# Patient Record
Sex: MALE | Race: WHITE | Employment: OTHER | URBAN - METROPOLITAN AREA
[De-identification: names, ages, dates, MRNs, and addresses within clinical notes are randomized per-mention and may not be internally consistent; named-entity substitution may affect disease eponyms.]

---

## 2017-01-20 ENCOUNTER — OFFICE VISIT (OUTPATIENT)
Dept: INTERNAL MEDICINE CLINIC | Age: 72
End: 2017-01-20

## 2017-01-20 VITALS
HEART RATE: 70 BPM | SYSTOLIC BLOOD PRESSURE: 120 MMHG | BODY MASS INDEX: 38.04 KG/M2 | DIASTOLIC BLOOD PRESSURE: 70 MMHG | WEIGHT: 287 LBS | HEIGHT: 73 IN

## 2017-01-20 DIAGNOSIS — Z09 FOLLOW-UP EXAM: ICD-10-CM

## 2017-01-20 DIAGNOSIS — Z11.59 NEED FOR HEPATITIS C SCREENING TEST: ICD-10-CM

## 2017-01-20 DIAGNOSIS — I10 ESSENTIAL HYPERTENSION, BENIGN: Primary | ICD-10-CM

## 2017-01-20 DIAGNOSIS — D47.3 ESSENTIAL THROMBOCYTOSIS (HCC): ICD-10-CM

## 2017-01-20 DIAGNOSIS — D47.3 ESSENTIAL THROMBOCYTHEMIA (HCC): ICD-10-CM

## 2017-01-20 LAB
ANION GAP SERPL CALCULATED.3IONS-SCNC: 15 MMOL/L (ref 3–16)
BUN BLDV-MCNC: 20 MG/DL (ref 7–20)
CALCIUM SERPL-MCNC: 9.5 MG/DL (ref 8.3–10.6)
CHLORIDE BLD-SCNC: 104 MMOL/L (ref 99–110)
CO2: 27 MMOL/L (ref 21–32)
CREAT SERPL-MCNC: 0.9 MG/DL (ref 0.8–1.3)
GFR AFRICAN AMERICAN: >60
GFR NON-AFRICAN AMERICAN: >60
GLUCOSE BLD-MCNC: 99 MG/DL (ref 70–99)
HEPATITIS C ANTIBODY INTERPRETATION: NORMAL
POTASSIUM SERPL-SCNC: 3.9 MMOL/L (ref 3.5–5.1)
SODIUM BLD-SCNC: 146 MMOL/L (ref 136–145)

## 2017-01-20 PROCEDURE — 36415 COLL VENOUS BLD VENIPUNCTURE: CPT | Performed by: INTERNAL MEDICINE

## 2017-01-20 PROCEDURE — 99213 OFFICE O/P EST LOW 20 MIN: CPT | Performed by: INTERNAL MEDICINE

## 2017-01-20 ASSESSMENT — ENCOUNTER SYMPTOMS
WHEEZING: 0
COUGH: 0
GASTROINTESTINAL NEGATIVE: 1
CHEST TIGHTNESS: 0
SHORTNESS OF BREATH: 0

## 2017-02-11 PROBLEM — Z79.899 ENCOUNTER FOR LONG-TERM (CURRENT) USE OF HIGH-RISK MEDICATION: Status: ACTIVE | Noted: 2017-02-11

## 2017-02-11 PROBLEM — Z15.89 JAK-2 GENE MUTATION: Status: ACTIVE | Noted: 2017-02-11

## 2017-02-25 RX ORDER — TRIAMTERENE AND HYDROCHLOROTHIAZIDE 37.5; 25 MG/1; MG/1
TABLET ORAL
Qty: 30 TABLET | Refills: 5 | Status: SHIPPED | OUTPATIENT
Start: 2017-02-25 | End: 2017-08-20 | Stop reason: SDUPTHER

## 2017-05-12 ENCOUNTER — OFFICE VISIT (OUTPATIENT)
Dept: INTERNAL MEDICINE CLINIC | Age: 72
End: 2017-05-12

## 2017-05-12 VITALS
WEIGHT: 293 LBS | RESPIRATION RATE: 18 BRPM | HEIGHT: 72 IN | BODY MASS INDEX: 39.68 KG/M2 | DIASTOLIC BLOOD PRESSURE: 80 MMHG | SYSTOLIC BLOOD PRESSURE: 120 MMHG | HEART RATE: 88 BPM

## 2017-05-12 DIAGNOSIS — I10 ESSENTIAL HYPERTENSION, BENIGN: Primary | ICD-10-CM

## 2017-05-12 DIAGNOSIS — N40.1 BENIGN NON-NODULAR PROSTATIC HYPERPLASIA WITH LOWER URINARY TRACT SYMPTOMS: ICD-10-CM

## 2017-05-12 LAB
BILIRUBIN URINE: NEGATIVE
BLOOD, URINE: NEGATIVE
CLARITY: CLEAR
COLOR: YELLOW
EPITHELIAL CELLS, UA: 0 /HPF (ref 0–5)
GLUCOSE URINE: NEGATIVE MG/DL
HYALINE CASTS: 1 /LPF (ref 0–8)
KETONES, URINE: NEGATIVE MG/DL
LEUKOCYTE ESTERASE, URINE: NEGATIVE
MICROSCOPIC EXAMINATION: NORMAL
NITRITE, URINE: NEGATIVE
PH UA: 6
PROTEIN UA: NEGATIVE MG/DL
RBC UA: 1 /HPF (ref 0–4)
SPECIFIC GRAVITY UA: 1.02
UROBILINOGEN, URINE: 1 E.U./DL
WBC UA: 0 /HPF (ref 0–5)

## 2017-05-12 PROCEDURE — 81001 URINALYSIS AUTO W/SCOPE: CPT | Performed by: INTERNAL MEDICINE

## 2017-05-12 PROCEDURE — 99213 OFFICE O/P EST LOW 20 MIN: CPT | Performed by: INTERNAL MEDICINE

## 2017-05-12 RX ORDER — TAMSULOSIN HYDROCHLORIDE 0.4 MG/1
0.4 CAPSULE ORAL DAILY
Qty: 30 CAPSULE | Refills: 3 | Status: SHIPPED | OUTPATIENT
Start: 2017-05-12 | End: 2017-08-29 | Stop reason: SDUPTHER

## 2017-05-12 ASSESSMENT — ENCOUNTER SYMPTOMS
GASTROINTESTINAL NEGATIVE: 1
SORE THROAT: 0
SHORTNESS OF BREATH: 0
COUGH: 0
STRIDOR: 0
CHEST TIGHTNESS: 0
TROUBLE SWALLOWING: 0

## 2017-05-12 ASSESSMENT — PATIENT HEALTH QUESTIONNAIRE - PHQ9
2. FEELING DOWN, DEPRESSED OR HOPELESS: 0
1. LITTLE INTEREST OR PLEASURE IN DOING THINGS: 0
SUM OF ALL RESPONSES TO PHQ QUESTIONS 1-9: 0
SUM OF ALL RESPONSES TO PHQ9 QUESTIONS 1 & 2: 0

## 2017-08-18 ENCOUNTER — OFFICE VISIT (OUTPATIENT)
Dept: INTERNAL MEDICINE CLINIC | Age: 72
End: 2017-08-18

## 2017-08-18 VITALS
HEIGHT: 72 IN | WEIGHT: 289 LBS | DIASTOLIC BLOOD PRESSURE: 70 MMHG | HEART RATE: 85 BPM | SYSTOLIC BLOOD PRESSURE: 130 MMHG | OXYGEN SATURATION: 98 % | BODY MASS INDEX: 39.14 KG/M2 | RESPIRATION RATE: 16 BRPM

## 2017-08-18 DIAGNOSIS — I10 ESSENTIAL HYPERTENSION, BENIGN: Primary | ICD-10-CM

## 2017-08-18 PROCEDURE — 90670 PCV13 VACCINE IM: CPT | Performed by: INTERNAL MEDICINE

## 2017-08-18 PROCEDURE — 99213 OFFICE O/P EST LOW 20 MIN: CPT | Performed by: INTERNAL MEDICINE

## 2017-08-18 PROCEDURE — G0009 ADMIN PNEUMOCOCCAL VACCINE: HCPCS | Performed by: INTERNAL MEDICINE

## 2017-08-18 ASSESSMENT — ENCOUNTER SYMPTOMS
GASTROINTESTINAL NEGATIVE: 1
COUGH: 0
SHORTNESS OF BREATH: 0
WHEEZING: 0
CHEST TIGHTNESS: 0

## 2017-08-21 RX ORDER — TRIAMTERENE AND HYDROCHLOROTHIAZIDE 37.5; 25 MG/1; MG/1
TABLET ORAL
Qty: 30 TABLET | Refills: 5 | Status: SHIPPED | OUTPATIENT
Start: 2017-08-21 | End: 2018-01-12 | Stop reason: SDUPTHER

## 2017-08-29 RX ORDER — TAMSULOSIN HYDROCHLORIDE 0.4 MG/1
CAPSULE ORAL
Qty: 30 CAPSULE | Refills: 5 | Status: SHIPPED | OUTPATIENT
Start: 2017-08-29 | End: 2018-02-10 | Stop reason: SDUPTHER

## 2017-11-29 ENCOUNTER — OFFICE VISIT (OUTPATIENT)
Dept: INTERNAL MEDICINE CLINIC | Age: 72
End: 2017-11-29

## 2017-11-29 VITALS
HEART RATE: 72 BPM | OXYGEN SATURATION: 97 % | RESPIRATION RATE: 16 BRPM | WEIGHT: 281 LBS | SYSTOLIC BLOOD PRESSURE: 130 MMHG | DIASTOLIC BLOOD PRESSURE: 80 MMHG | HEIGHT: 72 IN | BODY MASS INDEX: 38.06 KG/M2

## 2017-11-29 DIAGNOSIS — I10 ESSENTIAL HYPERTENSION, BENIGN: Primary | ICD-10-CM

## 2017-11-29 DIAGNOSIS — L71.9 ROSACEA: ICD-10-CM

## 2017-11-29 DIAGNOSIS — Z12.5 SPECIAL SCREENING FOR MALIGNANT NEOPLASM OF PROSTATE: ICD-10-CM

## 2017-11-29 LAB
A/G RATIO: 2.4 (ref 1.1–2.2)
ALBUMIN SERPL-MCNC: 4.5 G/DL (ref 3.4–5)
ALP BLD-CCNC: 43 U/L (ref 40–129)
ALT SERPL-CCNC: 11 U/L (ref 10–40)
ANION GAP SERPL CALCULATED.3IONS-SCNC: 12 MMOL/L (ref 3–16)
AST SERPL-CCNC: 14 U/L (ref 15–37)
BASOPHILS ABSOLUTE: 0 K/UL (ref 0–0.2)
BASOPHILS RELATIVE PERCENT: 1 %
BILIRUB SERPL-MCNC: 0.9 MG/DL (ref 0–1)
BUN BLDV-MCNC: 16 MG/DL (ref 7–20)
CALCIUM SERPL-MCNC: 9.3 MG/DL (ref 8.3–10.6)
CHLORIDE BLD-SCNC: 102 MMOL/L (ref 99–110)
CHOLESTEROL, TOTAL: 159 MG/DL (ref 0–199)
CO2: 28 MMOL/L (ref 21–32)
CREAT SERPL-MCNC: 0.7 MG/DL (ref 0.8–1.3)
EOSINOPHILS ABSOLUTE: 0.1 K/UL (ref 0–0.6)
EOSINOPHILS RELATIVE PERCENT: 2.1 %
GFR AFRICAN AMERICAN: >60
GFR NON-AFRICAN AMERICAN: >60
GLOBULIN: 1.9 G/DL
GLUCOSE BLD-MCNC: 103 MG/DL (ref 70–99)
HCT VFR BLD CALC: 39.2 % (ref 40.5–52.5)
HDLC SERPL-MCNC: 43 MG/DL (ref 40–60)
HEMOGLOBIN: 13.7 G/DL (ref 13.5–17.5)
LDL CHOLESTEROL CALCULATED: 97 MG/DL
LYMPHOCYTES ABSOLUTE: 1 K/UL (ref 1–5.1)
LYMPHOCYTES RELATIVE PERCENT: 19.4 %
MCH RBC QN AUTO: 35.1 PG (ref 26–34)
MCHC RBC AUTO-ENTMCNC: 35 G/DL (ref 31–36)
MCV RBC AUTO: 100.3 FL (ref 80–100)
MONOCYTES ABSOLUTE: 0.4 K/UL (ref 0–1.3)
MONOCYTES RELATIVE PERCENT: 7.2 %
NEUTROPHILS ABSOLUTE: 3.6 K/UL (ref 1.7–7.7)
NEUTROPHILS RELATIVE PERCENT: 70.3 %
PDW BLD-RTO: 15 % (ref 12.4–15.4)
PLATELET # BLD: 291 K/UL (ref 135–450)
PMV BLD AUTO: 6.9 FL (ref 5–10.5)
POTASSIUM SERPL-SCNC: 3.9 MMOL/L (ref 3.5–5.1)
PROSTATE SPECIFIC ANTIGEN: 2.49 NG/ML (ref 0–4)
RBC # BLD: 3.91 M/UL (ref 4.2–5.9)
SODIUM BLD-SCNC: 142 MMOL/L (ref 136–145)
TOTAL PROTEIN: 6.4 G/DL (ref 6.4–8.2)
TRIGL SERPL-MCNC: 96 MG/DL (ref 0–150)
VLDLC SERPL CALC-MCNC: 19 MG/DL
WBC # BLD: 5.1 K/UL (ref 4–11)

## 2017-11-29 PROCEDURE — 99213 OFFICE O/P EST LOW 20 MIN: CPT | Performed by: INTERNAL MEDICINE

## 2017-11-29 PROCEDURE — 36415 COLL VENOUS BLD VENIPUNCTURE: CPT | Performed by: INTERNAL MEDICINE

## 2017-11-29 PROCEDURE — 1123F ACP DISCUSS/DSCN MKR DOCD: CPT | Performed by: INTERNAL MEDICINE

## 2017-11-29 PROCEDURE — G8484 FLU IMMUNIZE NO ADMIN: HCPCS | Performed by: INTERNAL MEDICINE

## 2017-11-29 PROCEDURE — G8417 CALC BMI ABV UP PARAM F/U: HCPCS | Performed by: INTERNAL MEDICINE

## 2017-11-29 PROCEDURE — G8427 DOCREV CUR MEDS BY ELIG CLIN: HCPCS | Performed by: INTERNAL MEDICINE

## 2017-11-29 PROCEDURE — 4040F PNEUMOC VAC/ADMIN/RCVD: CPT | Performed by: INTERNAL MEDICINE

## 2017-11-29 PROCEDURE — 3017F COLORECTAL CA SCREEN DOC REV: CPT | Performed by: INTERNAL MEDICINE

## 2017-11-29 PROCEDURE — 1036F TOBACCO NON-USER: CPT | Performed by: INTERNAL MEDICINE

## 2017-11-29 ASSESSMENT — ENCOUNTER SYMPTOMS
GASTROINTESTINAL NEGATIVE: 1
SHORTNESS OF BREATH: 0
WHEEZING: 0
COUGH: 0
CHEST TIGHTNESS: 0

## 2017-11-29 NOTE — PROGRESS NOTES
Subjective:      Patient ID: Myriam Sher is a 81 y.o. male.htn  HPI  Feels fine and has no new symptoms  Has n symptoms with activity  Has no other cp gi or gu symptoms and flomax is helping his urinary symptoms  Review of Systems   Respiratory: Negative for cough, chest tightness, shortness of breath and wheezing. Cardiovascular: Negative for chest pain, palpitations and leg swelling. Gastrointestinal: Negative. Genitourinary: Negative. Neurological: Negative for dizziness, light-headedness, numbness and headaches. Objective:   Physical Exam   Constitutional: He is oriented to person, place, and time. No distress. Eyes: Conjunctivae are normal. Pupils are equal, round, and reactive to light. No scleral icterus. Neck: No JVD present. No thyromegaly present. Cardiovascular: Normal rate, regular rhythm, normal heart sounds and intact distal pulses. Exam reveals no gallop. No murmur heard. Pulmonary/Chest: Breath sounds normal. No respiratory distress. He has no wheezes. He has no rales. Musculoskeletal: Edema: trace edema of legs+   Lymphadenopathy:     He has no cervical adenopathy. Neurological: He is alert and oriented to person, place, and time. Skin:   Has erythematous rash seen over both malar areas and nose and mid frontal areas and has papules within this areas and has it for 6 months    Nursing note and vitals reviewed.       Assessment:      htn controlled  Skin rash most likely from rosacea      Plan:      Rash is not bothering him and has it on and off and if this gets worse,may need treatment  Continue current medications  Will check labs today   See in  3 months

## 2018-01-12 RX ORDER — TRIAMTERENE AND HYDROCHLOROTHIAZIDE 37.5; 25 MG/1; MG/1
TABLET ORAL
Qty: 30 TABLET | Refills: 5 | Status: SHIPPED | OUTPATIENT
Start: 2018-01-12 | End: 2018-07-03 | Stop reason: SDUPTHER

## 2018-02-12 RX ORDER — TAMSULOSIN HYDROCHLORIDE 0.4 MG/1
CAPSULE ORAL
Qty: 30 CAPSULE | Refills: 5 | Status: SHIPPED | OUTPATIENT
Start: 2018-02-12 | End: 2018-02-23 | Stop reason: SDUPTHER

## 2018-02-23 ENCOUNTER — OFFICE VISIT (OUTPATIENT)
Dept: INTERNAL MEDICINE CLINIC | Age: 73
End: 2018-02-23

## 2018-02-23 VITALS
SYSTOLIC BLOOD PRESSURE: 120 MMHG | BODY MASS INDEX: 38.6 KG/M2 | HEART RATE: 90 BPM | DIASTOLIC BLOOD PRESSURE: 70 MMHG | HEIGHT: 72 IN | RESPIRATION RATE: 18 BRPM | OXYGEN SATURATION: 96 % | WEIGHT: 285 LBS

## 2018-02-23 DIAGNOSIS — N40.1 BENIGN NON-NODULAR PROSTATIC HYPERPLASIA WITH LOWER URINARY TRACT SYMPTOMS: ICD-10-CM

## 2018-02-23 DIAGNOSIS — I10 ESSENTIAL HYPERTENSION, BENIGN: Primary | ICD-10-CM

## 2018-02-23 PROCEDURE — G8417 CALC BMI ABV UP PARAM F/U: HCPCS | Performed by: INTERNAL MEDICINE

## 2018-02-23 PROCEDURE — 3017F COLORECTAL CA SCREEN DOC REV: CPT | Performed by: INTERNAL MEDICINE

## 2018-02-23 PROCEDURE — 1036F TOBACCO NON-USER: CPT | Performed by: INTERNAL MEDICINE

## 2018-02-23 PROCEDURE — 99213 OFFICE O/P EST LOW 20 MIN: CPT | Performed by: INTERNAL MEDICINE

## 2018-02-23 PROCEDURE — 1123F ACP DISCUSS/DSCN MKR DOCD: CPT | Performed by: INTERNAL MEDICINE

## 2018-02-23 PROCEDURE — G8484 FLU IMMUNIZE NO ADMIN: HCPCS | Performed by: INTERNAL MEDICINE

## 2018-02-23 PROCEDURE — G8427 DOCREV CUR MEDS BY ELIG CLIN: HCPCS | Performed by: INTERNAL MEDICINE

## 2018-02-23 PROCEDURE — 4040F PNEUMOC VAC/ADMIN/RCVD: CPT | Performed by: INTERNAL MEDICINE

## 2018-02-23 RX ORDER — TAMSULOSIN HYDROCHLORIDE 0.4 MG/1
CAPSULE ORAL
Qty: 30 CAPSULE | Refills: 5 | Status: SHIPPED | OUTPATIENT
Start: 2018-02-23 | End: 2018-08-07 | Stop reason: SDUPTHER

## 2018-02-23 ASSESSMENT — ENCOUNTER SYMPTOMS
GASTROINTESTINAL NEGATIVE: 1
TROUBLE SWALLOWING: 0
COUGH: 0
SHORTNESS OF BREATH: 0
WHEEZING: 0
CHEST TIGHTNESS: 0

## 2018-02-23 NOTE — PROGRESS NOTES
Subjective:      Patient ID: Sheldon Melo is a 81 y.o. male.htn     HPI  Has few aches and pains some times  Has no symplasm with activity  flomax is helping his symptoms and sleeping better   Has no cp gi or gu symptoms  Quit CPAP long time ago as he could not tolerate sleeping with t his. Review of Systems   Constitutional: Negative for activity change, appetite change and unexpected weight change. HENT: Negative for trouble swallowing. Respiratory: Negative for cough, chest tightness, shortness of breath and wheezing. Cardiovascular: Positive for leg swelling. Negative for chest pain. Gastrointestinal: Negative. Neurological: Negative for dizziness, light-headedness and headaches. Objective:   Physical Exam   Constitutional: He is oriented to person, place, and time. No distress. Eyes: Conjunctivae and EOM are normal. Pupils are equal, round, and reactive to light. No scleral icterus. Neck: No JVD present. No thyromegaly present. Cardiovascular: Normal rate, regular rhythm, normal heart sounds and intact distal pulses. Exam reveals no gallop. No murmur heard. Pulmonary/Chest: Breath sounds normal. No respiratory distress. He has no wheezes. He has no rales. Musculoskeletal: Edema: has trace edema of legs. Lymphadenopathy:     He has no cervical adenopathy. Neurological: He is alert and oriented to person, place, and time. Nursing note and vitals reviewed.       Assessment:    HTN well controlled   bpg sym,ptoms improved   Plan:      Continue current medications  See in  3 months

## 2018-06-01 ENCOUNTER — OFFICE VISIT (OUTPATIENT)
Dept: INTERNAL MEDICINE CLINIC | Age: 73
End: 2018-06-01

## 2018-06-01 VITALS
RESPIRATION RATE: 16 BRPM | SYSTOLIC BLOOD PRESSURE: 120 MMHG | HEIGHT: 72 IN | BODY MASS INDEX: 38.47 KG/M2 | HEART RATE: 92 BPM | DIASTOLIC BLOOD PRESSURE: 78 MMHG | OXYGEN SATURATION: 98 % | WEIGHT: 284 LBS

## 2018-06-01 DIAGNOSIS — I10 ESSENTIAL HYPERTENSION, BENIGN: Primary | ICD-10-CM

## 2018-06-01 PROCEDURE — G8417 CALC BMI ABV UP PARAM F/U: HCPCS | Performed by: INTERNAL MEDICINE

## 2018-06-01 PROCEDURE — 1036F TOBACCO NON-USER: CPT | Performed by: INTERNAL MEDICINE

## 2018-06-01 PROCEDURE — 3288F FALL RISK ASSESSMENT DOCD: CPT | Performed by: INTERNAL MEDICINE

## 2018-06-01 PROCEDURE — 4040F PNEUMOC VAC/ADMIN/RCVD: CPT | Performed by: INTERNAL MEDICINE

## 2018-06-01 PROCEDURE — 1123F ACP DISCUSS/DSCN MKR DOCD: CPT | Performed by: INTERNAL MEDICINE

## 2018-06-01 PROCEDURE — G8427 DOCREV CUR MEDS BY ELIG CLIN: HCPCS | Performed by: INTERNAL MEDICINE

## 2018-06-01 PROCEDURE — 99213 OFFICE O/P EST LOW 20 MIN: CPT | Performed by: INTERNAL MEDICINE

## 2018-06-01 PROCEDURE — G8510 SCR DEP NEG, NO PLAN REQD: HCPCS | Performed by: INTERNAL MEDICINE

## 2018-06-01 PROCEDURE — 3017F COLORECTAL CA SCREEN DOC REV: CPT | Performed by: INTERNAL MEDICINE

## 2018-06-01 RX ORDER — DICLOFENAC SODIUM 75 MG/1
75 TABLET, DELAYED RELEASE ORAL 2 TIMES DAILY PRN
Qty: 60 TABLET | Refills: 0 | Status: SHIPPED | OUTPATIENT
Start: 2018-06-01 | End: 2019-04-04 | Stop reason: SDUPTHER

## 2018-06-01 ASSESSMENT — ENCOUNTER SYMPTOMS
WHEEZING: 0
COUGH: 0
GASTROINTESTINAL NEGATIVE: 1
CHEST TIGHTNESS: 0
SHORTNESS OF BREATH: 0

## 2018-06-01 ASSESSMENT — PATIENT HEALTH QUESTIONNAIRE - PHQ9
SUM OF ALL RESPONSES TO PHQ9 QUESTIONS 1 & 2: 0
2. FEELING DOWN, DEPRESSED OR HOPELESS: 0
SUM OF ALL RESPONSES TO PHQ QUESTIONS 1-9: 0
1. LITTLE INTEREST OR PLEASURE IN DOING THINGS: 0

## 2018-07-03 RX ORDER — TRIAMTERENE AND HYDROCHLOROTHIAZIDE 37.5; 25 MG/1; MG/1
TABLET ORAL
Qty: 30 TABLET | Refills: 5 | Status: SHIPPED | OUTPATIENT
Start: 2018-07-03 | End: 2018-10-25 | Stop reason: SDUPTHER

## 2018-08-07 RX ORDER — TAMSULOSIN HYDROCHLORIDE 0.4 MG/1
CAPSULE ORAL
Qty: 30 CAPSULE | Refills: 5 | Status: SHIPPED | OUTPATIENT
Start: 2018-08-07 | End: 2019-01-30 | Stop reason: SDUPTHER

## 2018-09-05 ENCOUNTER — OFFICE VISIT (OUTPATIENT)
Dept: INTERNAL MEDICINE CLINIC | Age: 73
End: 2018-09-05

## 2018-09-05 VITALS
SYSTOLIC BLOOD PRESSURE: 120 MMHG | WEIGHT: 285 LBS | BODY MASS INDEX: 38.6 KG/M2 | HEIGHT: 72 IN | DIASTOLIC BLOOD PRESSURE: 70 MMHG | HEART RATE: 89 BPM | RESPIRATION RATE: 16 BRPM | OXYGEN SATURATION: 98 %

## 2018-09-05 DIAGNOSIS — D47.3 ESSENTIAL THROMBOCYTHEMIA (HCC): ICD-10-CM

## 2018-09-05 DIAGNOSIS — I10 ESSENTIAL HYPERTENSION, BENIGN: Primary | ICD-10-CM

## 2018-09-05 PROCEDURE — 1101F PT FALLS ASSESS-DOCD LE1/YR: CPT | Performed by: INTERNAL MEDICINE

## 2018-09-05 PROCEDURE — 1123F ACP DISCUSS/DSCN MKR DOCD: CPT | Performed by: INTERNAL MEDICINE

## 2018-09-05 PROCEDURE — G8417 CALC BMI ABV UP PARAM F/U: HCPCS | Performed by: INTERNAL MEDICINE

## 2018-09-05 PROCEDURE — 4040F PNEUMOC VAC/ADMIN/RCVD: CPT | Performed by: INTERNAL MEDICINE

## 2018-09-05 PROCEDURE — 99213 OFFICE O/P EST LOW 20 MIN: CPT | Performed by: INTERNAL MEDICINE

## 2018-09-05 PROCEDURE — G8427 DOCREV CUR MEDS BY ELIG CLIN: HCPCS | Performed by: INTERNAL MEDICINE

## 2018-09-05 PROCEDURE — 3017F COLORECTAL CA SCREEN DOC REV: CPT | Performed by: INTERNAL MEDICINE

## 2018-09-05 PROCEDURE — 1036F TOBACCO NON-USER: CPT | Performed by: INTERNAL MEDICINE

## 2018-09-05 ASSESSMENT — ENCOUNTER SYMPTOMS
WHEEZING: 0
GASTROINTESTINAL NEGATIVE: 1
SHORTNESS OF BREATH: 0
CHEST TIGHTNESS: 0
COUGH: 0

## 2018-09-27 PROBLEM — Z11.59 NEED FOR HEPATITIS C SCREENING TEST: Status: RESOLVED | Noted: 2017-01-20 | Resolved: 2018-09-27

## 2018-10-25 RX ORDER — TRIAMTERENE AND HYDROCHLOROTHIAZIDE 37.5; 25 MG/1; MG/1
TABLET ORAL
Qty: 90 TABLET | Refills: 1 | Status: SHIPPED | OUTPATIENT
Start: 2018-10-25 | End: 2019-04-18 | Stop reason: SDUPTHER

## 2018-12-07 ENCOUNTER — OFFICE VISIT (OUTPATIENT)
Dept: INTERNAL MEDICINE CLINIC | Age: 73
End: 2018-12-07
Payer: MEDICARE

## 2018-12-07 VITALS
BODY MASS INDEX: 37.93 KG/M2 | HEIGHT: 72 IN | SYSTOLIC BLOOD PRESSURE: 120 MMHG | DIASTOLIC BLOOD PRESSURE: 70 MMHG | RESPIRATION RATE: 16 BRPM | HEART RATE: 84 BPM | OXYGEN SATURATION: 99 % | WEIGHT: 280 LBS

## 2018-12-07 DIAGNOSIS — I10 ESSENTIAL HYPERTENSION, BENIGN: Primary | ICD-10-CM

## 2018-12-07 DIAGNOSIS — Z12.5 PROSTATE CANCER SCREENING: ICD-10-CM

## 2018-12-07 LAB
ANION GAP SERPL CALCULATED.3IONS-SCNC: 11 MMOL/L (ref 3–16)
BASOPHILS ABSOLUTE: 0.1 K/UL (ref 0–0.2)
BASOPHILS RELATIVE PERCENT: 0.8 %
BUN BLDV-MCNC: 16 MG/DL (ref 7–20)
CALCIUM SERPL-MCNC: 9.4 MG/DL (ref 8.3–10.6)
CHLORIDE BLD-SCNC: 105 MMOL/L (ref 99–110)
CO2: 28 MMOL/L (ref 21–32)
CREAT SERPL-MCNC: 0.8 MG/DL (ref 0.8–1.3)
EOSINOPHILS ABSOLUTE: 0.2 K/UL (ref 0–0.6)
EOSINOPHILS RELATIVE PERCENT: 2.9 %
GFR AFRICAN AMERICAN: >60
GFR NON-AFRICAN AMERICAN: >60
GLUCOSE BLD-MCNC: 120 MG/DL (ref 70–99)
HCT VFR BLD CALC: 40.9 % (ref 40.5–52.5)
HEMOGLOBIN: 14.3 G/DL (ref 13.5–17.5)
LYMPHOCYTES ABSOLUTE: 1.1 K/UL (ref 1–5.1)
LYMPHOCYTES RELATIVE PERCENT: 18 %
MCH RBC QN AUTO: 35.1 PG (ref 26–34)
MCHC RBC AUTO-ENTMCNC: 34.9 G/DL (ref 31–36)
MCV RBC AUTO: 100.5 FL (ref 80–100)
MONOCYTES ABSOLUTE: 0.5 K/UL (ref 0–1.3)
MONOCYTES RELATIVE PERCENT: 8.2 %
NEUTROPHILS ABSOLUTE: 4.3 K/UL (ref 1.7–7.7)
NEUTROPHILS RELATIVE PERCENT: 70.1 %
PDW BLD-RTO: 15.1 % (ref 12.4–15.4)
PLATELET # BLD: 334 K/UL (ref 135–450)
PMV BLD AUTO: 7.2 FL (ref 5–10.5)
POTASSIUM SERPL-SCNC: 4.2 MMOL/L (ref 3.5–5.1)
PROSTATE SPECIFIC ANTIGEN: 2.89 NG/ML (ref 0–4)
RBC # BLD: 4.07 M/UL (ref 4.2–5.9)
SODIUM BLD-SCNC: 144 MMOL/L (ref 136–145)
WBC # BLD: 6.2 K/UL (ref 4–11)

## 2018-12-07 PROCEDURE — G8417 CALC BMI ABV UP PARAM F/U: HCPCS | Performed by: INTERNAL MEDICINE

## 2018-12-07 PROCEDURE — 1101F PT FALLS ASSESS-DOCD LE1/YR: CPT | Performed by: INTERNAL MEDICINE

## 2018-12-07 PROCEDURE — 1123F ACP DISCUSS/DSCN MKR DOCD: CPT | Performed by: INTERNAL MEDICINE

## 2018-12-07 PROCEDURE — 1036F TOBACCO NON-USER: CPT | Performed by: INTERNAL MEDICINE

## 2018-12-07 PROCEDURE — G8427 DOCREV CUR MEDS BY ELIG CLIN: HCPCS | Performed by: INTERNAL MEDICINE

## 2018-12-07 PROCEDURE — 3017F COLORECTAL CA SCREEN DOC REV: CPT | Performed by: INTERNAL MEDICINE

## 2018-12-07 PROCEDURE — 4040F PNEUMOC VAC/ADMIN/RCVD: CPT | Performed by: INTERNAL MEDICINE

## 2018-12-07 PROCEDURE — 36415 COLL VENOUS BLD VENIPUNCTURE: CPT | Performed by: INTERNAL MEDICINE

## 2018-12-07 PROCEDURE — 99213 OFFICE O/P EST LOW 20 MIN: CPT | Performed by: INTERNAL MEDICINE

## 2018-12-07 PROCEDURE — G8484 FLU IMMUNIZE NO ADMIN: HCPCS | Performed by: INTERNAL MEDICINE

## 2018-12-07 ASSESSMENT — ENCOUNTER SYMPTOMS
CHEST TIGHTNESS: 0
WHEEZING: 0
COUGH: 0
SHORTNESS OF BREATH: 0

## 2019-01-06 PROBLEM — Z12.5 PROSTATE CANCER SCREENING: Status: RESOLVED | Noted: 2018-12-07 | Resolved: 2019-01-06

## 2019-01-30 RX ORDER — TAMSULOSIN HYDROCHLORIDE 0.4 MG/1
CAPSULE ORAL
Qty: 90 CAPSULE | Refills: 1 | Status: SHIPPED | OUTPATIENT
Start: 2019-01-30 | End: 2019-04-30 | Stop reason: SDUPTHER

## 2019-03-12 ENCOUNTER — OFFICE VISIT (OUTPATIENT)
Dept: INTERNAL MEDICINE CLINIC | Age: 74
End: 2019-03-12
Payer: MEDICARE

## 2019-03-12 VITALS
WEIGHT: 279 LBS | BODY MASS INDEX: 37.79 KG/M2 | DIASTOLIC BLOOD PRESSURE: 72 MMHG | HEIGHT: 72 IN | SYSTOLIC BLOOD PRESSURE: 124 MMHG | RESPIRATION RATE: 16 BRPM

## 2019-03-12 DIAGNOSIS — B36.9 FUNGAL DERMATITIS: ICD-10-CM

## 2019-03-12 PROCEDURE — G8417 CALC BMI ABV UP PARAM F/U: HCPCS | Performed by: INTERNAL MEDICINE

## 2019-03-12 PROCEDURE — 3017F COLORECTAL CA SCREEN DOC REV: CPT | Performed by: INTERNAL MEDICINE

## 2019-03-12 PROCEDURE — G8484 FLU IMMUNIZE NO ADMIN: HCPCS | Performed by: INTERNAL MEDICINE

## 2019-03-12 PROCEDURE — 1101F PT FALLS ASSESS-DOCD LE1/YR: CPT | Performed by: INTERNAL MEDICINE

## 2019-03-12 PROCEDURE — G8510 SCR DEP NEG, NO PLAN REQD: HCPCS | Performed by: INTERNAL MEDICINE

## 2019-03-12 PROCEDURE — 1123F ACP DISCUSS/DSCN MKR DOCD: CPT | Performed by: INTERNAL MEDICINE

## 2019-03-12 PROCEDURE — 4040F PNEUMOC VAC/ADMIN/RCVD: CPT | Performed by: INTERNAL MEDICINE

## 2019-03-12 PROCEDURE — G8427 DOCREV CUR MEDS BY ELIG CLIN: HCPCS | Performed by: INTERNAL MEDICINE

## 2019-03-12 PROCEDURE — 99212 OFFICE O/P EST SF 10 MIN: CPT | Performed by: INTERNAL MEDICINE

## 2019-03-12 PROCEDURE — 1036F TOBACCO NON-USER: CPT | Performed by: INTERNAL MEDICINE

## 2019-03-12 RX ORDER — CLOTRIMAZOLE AND BETAMETHASONE DIPROPIONATE 10; .64 MG/G; MG/G
CREAM TOPICAL
Qty: 60 G | Refills: 1 | Status: SHIPPED | OUTPATIENT
Start: 2019-03-12 | End: 2019-08-19

## 2019-03-12 ASSESSMENT — PATIENT HEALTH QUESTIONNAIRE - PHQ9
SUM OF ALL RESPONSES TO PHQ9 QUESTIONS 1 & 2: 0
SUM OF ALL RESPONSES TO PHQ QUESTIONS 1-9: 0
2. FEELING DOWN, DEPRESSED OR HOPELESS: 0
SUM OF ALL RESPONSES TO PHQ QUESTIONS 1-9: 0
1. LITTLE INTEREST OR PLEASURE IN DOING THINGS: 0

## 2019-03-22 ENCOUNTER — OFFICE VISIT (OUTPATIENT)
Dept: INTERNAL MEDICINE CLINIC | Age: 74
End: 2019-03-22
Payer: MEDICARE

## 2019-03-22 VITALS
SYSTOLIC BLOOD PRESSURE: 120 MMHG | WEIGHT: 277 LBS | DIASTOLIC BLOOD PRESSURE: 68 MMHG | HEIGHT: 72 IN | HEART RATE: 89 BPM | OXYGEN SATURATION: 97 % | BODY MASS INDEX: 37.52 KG/M2 | RESPIRATION RATE: 16 BRPM

## 2019-03-22 DIAGNOSIS — D47.3 ESSENTIAL THROMBOCYTHEMIA (HCC): ICD-10-CM

## 2019-03-22 DIAGNOSIS — I10 ESSENTIAL HYPERTENSION, BENIGN: Primary | ICD-10-CM

## 2019-03-22 PROCEDURE — 1101F PT FALLS ASSESS-DOCD LE1/YR: CPT | Performed by: INTERNAL MEDICINE

## 2019-03-22 PROCEDURE — 3017F COLORECTAL CA SCREEN DOC REV: CPT | Performed by: INTERNAL MEDICINE

## 2019-03-22 PROCEDURE — G8484 FLU IMMUNIZE NO ADMIN: HCPCS | Performed by: INTERNAL MEDICINE

## 2019-03-22 PROCEDURE — G8427 DOCREV CUR MEDS BY ELIG CLIN: HCPCS | Performed by: INTERNAL MEDICINE

## 2019-03-22 PROCEDURE — 1123F ACP DISCUSS/DSCN MKR DOCD: CPT | Performed by: INTERNAL MEDICINE

## 2019-03-22 PROCEDURE — 99213 OFFICE O/P EST LOW 20 MIN: CPT | Performed by: INTERNAL MEDICINE

## 2019-03-22 PROCEDURE — 1036F TOBACCO NON-USER: CPT | Performed by: INTERNAL MEDICINE

## 2019-03-22 PROCEDURE — 4040F PNEUMOC VAC/ADMIN/RCVD: CPT | Performed by: INTERNAL MEDICINE

## 2019-03-22 PROCEDURE — G8417 CALC BMI ABV UP PARAM F/U: HCPCS | Performed by: INTERNAL MEDICINE

## 2019-03-22 ASSESSMENT — ENCOUNTER SYMPTOMS
WHEEZING: 0
TROUBLE SWALLOWING: 0
CHEST TIGHTNESS: 0
GASTROINTESTINAL NEGATIVE: 1
SHORTNESS OF BREATH: 0
COUGH: 0

## 2019-04-04 RX ORDER — DICLOFENAC SODIUM 75 MG/1
TABLET, DELAYED RELEASE ORAL
Qty: 60 TABLET | Refills: 0 | Status: SHIPPED | OUTPATIENT
Start: 2019-04-04 | End: 2019-06-11 | Stop reason: SDUPTHER

## 2019-04-18 RX ORDER — TRIAMTERENE AND HYDROCHLOROTHIAZIDE 37.5; 25 MG/1; MG/1
TABLET ORAL
Qty: 90 TABLET | Refills: 1 | Status: SHIPPED | OUTPATIENT
Start: 2019-04-18 | End: 2019-06-11 | Stop reason: SDUPTHER

## 2019-04-30 RX ORDER — TAMSULOSIN HYDROCHLORIDE 0.4 MG/1
CAPSULE ORAL
Qty: 90 CAPSULE | Refills: 1 | Status: SHIPPED | OUTPATIENT
Start: 2019-04-30 | End: 2019-06-11 | Stop reason: SDUPTHER

## 2019-06-11 RX ORDER — TRIAMTERENE AND HYDROCHLOROTHIAZIDE 37.5; 25 MG/1; MG/1
TABLET ORAL
Qty: 90 TABLET | Refills: 1 | Status: SHIPPED | OUTPATIENT
Start: 2019-06-11 | End: 2019-09-11 | Stop reason: DRUGHIGH

## 2019-06-11 RX ORDER — DICLOFENAC SODIUM 75 MG/1
TABLET, DELAYED RELEASE ORAL
Qty: 60 TABLET | Refills: 0 | Status: SHIPPED | OUTPATIENT
Start: 2019-06-11 | End: 2019-07-10 | Stop reason: SDUPTHER

## 2019-06-11 RX ORDER — TAMSULOSIN HYDROCHLORIDE 0.4 MG/1
CAPSULE ORAL
Qty: 90 CAPSULE | Refills: 1 | Status: SHIPPED | OUTPATIENT
Start: 2019-06-11 | End: 2020-01-13

## 2019-06-11 RX ORDER — HYDROXYUREA 500 MG/1
CAPSULE ORAL
Qty: 60 CAPSULE | Refills: 2 | OUTPATIENT
Start: 2019-06-11

## 2019-06-17 ENCOUNTER — TELEPHONE (OUTPATIENT)
Dept: INTERNAL MEDICINE CLINIC | Age: 74
End: 2019-06-17

## 2019-06-24 ENCOUNTER — HOSPITAL ENCOUNTER (OUTPATIENT)
Dept: GENERAL RADIOLOGY | Age: 74
Discharge: HOME | End: 2019-06-24
Payer: MEDICARE

## 2019-06-24 ENCOUNTER — HOSPITAL ENCOUNTER (OUTPATIENT)
Age: 74
End: 2019-06-24
Payer: MEDICARE

## 2019-06-24 ENCOUNTER — OFFICE VISIT (OUTPATIENT)
Dept: INTERNAL MEDICINE CLINIC | Age: 74
End: 2019-06-24
Payer: MEDICARE

## 2019-06-24 VITALS
SYSTOLIC BLOOD PRESSURE: 120 MMHG | HEART RATE: 95 BPM | BODY MASS INDEX: 37.93 KG/M2 | WEIGHT: 280 LBS | HEIGHT: 72 IN | OXYGEN SATURATION: 98 % | DIASTOLIC BLOOD PRESSURE: 80 MMHG

## 2019-06-24 DIAGNOSIS — R10.31 RIGHT GROIN PAIN: ICD-10-CM

## 2019-06-24 DIAGNOSIS — I10 ESSENTIAL HYPERTENSION, BENIGN: Primary | ICD-10-CM

## 2019-06-24 LAB
ANION GAP SERPL CALCULATED.3IONS-SCNC: 13 MMOL/L (ref 3–16)
BASOPHILS ABSOLUTE: 0 K/UL (ref 0–0.2)
BASOPHILS RELATIVE PERCENT: 0.7 %
BUN BLDV-MCNC: 18 MG/DL (ref 7–20)
CALCIUM SERPL-MCNC: 9.7 MG/DL (ref 8.3–10.6)
CHLORIDE BLD-SCNC: 105 MMOL/L (ref 99–110)
CO2: 26 MMOL/L (ref 21–32)
CREAT SERPL-MCNC: 0.8 MG/DL (ref 0.8–1.3)
EOSINOPHILS ABSOLUTE: 0.2 K/UL (ref 0–0.6)
EOSINOPHILS RELATIVE PERCENT: 3.9 %
GFR AFRICAN AMERICAN: >60
GFR NON-AFRICAN AMERICAN: >60
GLUCOSE BLD-MCNC: 95 MG/DL (ref 70–99)
HCT VFR BLD CALC: 39.3 % (ref 40.5–52.5)
HEMOGLOBIN: 13.7 G/DL (ref 13.5–17.5)
LYMPHOCYTES ABSOLUTE: 1.2 K/UL (ref 1–5.1)
LYMPHOCYTES RELATIVE PERCENT: 20.7 %
MCH RBC QN AUTO: 34.9 PG (ref 26–34)
MCHC RBC AUTO-ENTMCNC: 34.9 G/DL (ref 31–36)
MCV RBC AUTO: 99.9 FL (ref 80–100)
MONOCYTES ABSOLUTE: 0.4 K/UL (ref 0–1.3)
MONOCYTES RELATIVE PERCENT: 7.8 %
NEUTROPHILS ABSOLUTE: 3.9 K/UL (ref 1.7–7.7)
NEUTROPHILS RELATIVE PERCENT: 66.9 %
PDW BLD-RTO: 15 % (ref 12.4–15.4)
PLATELET # BLD: 325 K/UL (ref 135–450)
PMV BLD AUTO: 7.1 FL (ref 5–10.5)
POTASSIUM SERPL-SCNC: 4.1 MMOL/L (ref 3.5–5.1)
RBC # BLD: 3.94 M/UL (ref 4.2–5.9)
SODIUM BLD-SCNC: 144 MMOL/L (ref 136–145)
WBC # BLD: 5.8 K/UL (ref 4–11)

## 2019-06-24 PROCEDURE — 99213 OFFICE O/P EST LOW 20 MIN: CPT | Performed by: INTERNAL MEDICINE

## 2019-06-24 PROCEDURE — G8417 CALC BMI ABV UP PARAM F/U: HCPCS | Performed by: INTERNAL MEDICINE

## 2019-06-24 PROCEDURE — 4040F PNEUMOC VAC/ADMIN/RCVD: CPT | Performed by: INTERNAL MEDICINE

## 2019-06-24 PROCEDURE — 1036F TOBACCO NON-USER: CPT | Performed by: INTERNAL MEDICINE

## 2019-06-24 PROCEDURE — 3017F COLORECTAL CA SCREEN DOC REV: CPT | Performed by: INTERNAL MEDICINE

## 2019-06-24 PROCEDURE — 3288F FALL RISK ASSESSMENT DOCD: CPT | Performed by: INTERNAL MEDICINE

## 2019-06-24 PROCEDURE — 1123F ACP DISCUSS/DSCN MKR DOCD: CPT | Performed by: INTERNAL MEDICINE

## 2019-06-24 PROCEDURE — 36415 COLL VENOUS BLD VENIPUNCTURE: CPT | Performed by: INTERNAL MEDICINE

## 2019-06-24 PROCEDURE — 73502 X-RAY EXAM HIP UNI 2-3 VIEWS: CPT

## 2019-06-24 PROCEDURE — G8427 DOCREV CUR MEDS BY ELIG CLIN: HCPCS | Performed by: INTERNAL MEDICINE

## 2019-06-24 ASSESSMENT — ENCOUNTER SYMPTOMS
CHEST TIGHTNESS: 0
SHORTNESS OF BREATH: 0
WHEEZING: 0
TROUBLE SWALLOWING: 0
GASTROINTESTINAL NEGATIVE: 1
COUGH: 0

## 2019-06-24 NOTE — PROGRESS NOTES
Subjective:      Patient ID: Matthew De Los Santos is a 72 y.o. male. HPI  Had bleeding after bm on toilet paper and has this on and off before also-has this on and off every 2-3 days  Has no pain and is not straining  Has no other gi or gu symptoms  right groin x painful x 2 weeks on and off.diclofenac helps some   Has no pain st rest when sitting or sleeping  Had no injury  Has no other cp gi or gu symptoms  Review of Systems   Constitutional: Negative for activity change, appetite change and unexpected weight change. HENT: Negative for trouble swallowing. Respiratory: Negative for cough, chest tightness, shortness of breath and wheezing. Cardiovascular: Negative for chest pain, palpitations and leg swelling. Gastrointestinal: Negative. Genitourinary: Positive for frequency. Neurological: Negative for dizziness, light-headedness and headaches. Objective:   Physical Exam   Constitutional: He is oriented to person, place, and time. No distress. Eyes: Pupils are equal, round, and reactive to light. Conjunctivae and EOM are normal. No scleral icterus. Neck: No JVD present. No thyromegaly present. Cardiovascular: Normal rate, regular rhythm and normal heart sounds. Exam reveals no gallop. No murmur heard. Pulmonary/Chest: Breath sounds normal. No respiratory distress. He has no wheezes. He has no rales. Musculoskeletal: Edema: has tarce edema of elft leg. Lymphadenopathy:     He has no cervical adenopathy. Neurological: He is alert and oriented to person, place, and time. Nursing note and vitals reviewed. Assessment:    htn controlled  Right  groin pain may be from worsening arthritis  Rectal  bleeding may be from hemorrhoids and colonoscopy and clonography in 2015 were negative      Plan:      Xray right hip  Take diclofenac 2 times daily with food for 2 weeks and stop.     Continue current medications  See in 3  months   Will  check labs and if bleeding persists,needs Gi consult,. See DR. Mckinney for evaluation.   Allyson Barnes MD

## 2019-06-24 NOTE — PATIENT INSTRUCTIONS
Xray right hip  Take diclofenac 2 times daily with food for 2 weeks and stop.     Continue current medications  See in 3  months

## 2019-06-25 ENCOUNTER — TELEPHONE (OUTPATIENT)
Dept: ORTHOPEDIC SURGERY | Age: 74
End: 2019-06-25

## 2019-06-25 NOTE — TELEPHONE ENCOUNTER
PT IS CALLING STATING HE HAD HIP XRAYS YESTERDAY AND IT SHOWED SEVERE OSTEOARTHRROSIS IN THE RIGHT HIP AND OSCAR VILLARREAL PUT HIM DICLOFENAC TODAY FOR 2 WEEKS TO SEE IF HE HAS GOTTEN ANY RELIEF SO HE WANTED TO KNOW SHOULD HE WAIT IT OUT TO SEE IF ANY RELIEF OR IF HE SHOULD COME SEE THE DOCTOR. HE CAN BE REACHED [phone].

## 2019-06-27 ENCOUNTER — OFFICE VISIT (OUTPATIENT)
Dept: ORTHOPEDIC SURGERY | Age: 74
End: 2019-06-27
Payer: MEDICARE

## 2019-06-27 VITALS
DIASTOLIC BLOOD PRESSURE: 85 MMHG | WEIGHT: 270 LBS | HEIGHT: 72 IN | BODY MASS INDEX: 36.57 KG/M2 | HEART RATE: 89 BPM | TEMPERATURE: 97.3 F | SYSTOLIC BLOOD PRESSURE: 131 MMHG

## 2019-06-27 DIAGNOSIS — M16.11 PRIMARY OSTEOARTHRITIS OF RIGHT HIP: ICD-10-CM

## 2019-06-27 DIAGNOSIS — M16.11 PRIMARY OSTEOARTHRITIS OF RIGHT HIP: Primary | ICD-10-CM

## 2019-06-27 LAB
ALBUMIN SERPL-MCNC: 4.9 G/DL (ref 3.4–5)
ANION GAP SERPL CALCULATED.3IONS-SCNC: 13 MMOL/L (ref 3–16)
APTT: 34.7 SEC (ref 26–36)
BASOPHILS ABSOLUTE: 0.1 K/UL (ref 0–0.2)
BASOPHILS RELATIVE PERCENT: 1.2 %
BUN BLDV-MCNC: 19 MG/DL (ref 7–20)
CALCIUM SERPL-MCNC: 9.5 MG/DL (ref 8.3–10.6)
CHLORIDE BLD-SCNC: 104 MMOL/L (ref 99–110)
CO2: 26 MMOL/L (ref 21–32)
CREAT SERPL-MCNC: 0.9 MG/DL (ref 0.8–1.3)
EOSINOPHILS ABSOLUTE: 0.2 K/UL (ref 0–0.6)
EOSINOPHILS RELATIVE PERCENT: 3.7 %
GFR AFRICAN AMERICAN: >60
GFR NON-AFRICAN AMERICAN: >60
GLUCOSE BLD-MCNC: 94 MG/DL (ref 70–99)
HCT VFR BLD CALC: 40.5 % (ref 40.5–52.5)
HEMOGLOBIN: 14.4 G/DL (ref 13.5–17.5)
INR BLD: 1.09 (ref 0.86–1.14)
LYMPHOCYTES ABSOLUTE: 0.9 K/UL (ref 1–5.1)
LYMPHOCYTES RELATIVE PERCENT: 18.6 %
MCH RBC QN AUTO: 36.1 PG (ref 26–34)
MCHC RBC AUTO-ENTMCNC: 35.6 G/DL (ref 31–36)
MCV RBC AUTO: 101.3 FL (ref 80–100)
MONOCYTES ABSOLUTE: 0.4 K/UL (ref 0–1.3)
MONOCYTES RELATIVE PERCENT: 7.4 %
NEUTROPHILS ABSOLUTE: 3.4 K/UL (ref 1.7–7.7)
NEUTROPHILS RELATIVE PERCENT: 69.1 %
PDW BLD-RTO: 15 % (ref 12.4–15.4)
PLATELET # BLD: 311 K/UL (ref 135–450)
PMV BLD AUTO: 6.9 FL (ref 5–10.5)
POTASSIUM SERPL-SCNC: 4.2 MMOL/L (ref 3.5–5.1)
PROTHROMBIN TIME: 12.4 SEC (ref 9.8–13)
RBC # BLD: 4 M/UL (ref 4.2–5.9)
SODIUM BLD-SCNC: 143 MMOL/L (ref 136–145)
TRANSFERRIN: 205 MG/DL (ref 200–360)
VITAMIN D 25-HYDROXY: 21.4 NG/ML
WBC # BLD: 4.9 K/UL (ref 4–11)

## 2019-06-27 PROCEDURE — 99214 OFFICE O/P EST MOD 30 MIN: CPT | Performed by: ORTHOPAEDIC SURGERY

## 2019-06-27 PROCEDURE — G8417 CALC BMI ABV UP PARAM F/U: HCPCS | Performed by: ORTHOPAEDIC SURGERY

## 2019-06-27 PROCEDURE — 4040F PNEUMOC VAC/ADMIN/RCVD: CPT | Performed by: ORTHOPAEDIC SURGERY

## 2019-06-27 PROCEDURE — G8427 DOCREV CUR MEDS BY ELIG CLIN: HCPCS | Performed by: ORTHOPAEDIC SURGERY

## 2019-06-27 PROCEDURE — 3017F COLORECTAL CA SCREEN DOC REV: CPT | Performed by: ORTHOPAEDIC SURGERY

## 2019-06-27 PROCEDURE — 1123F ACP DISCUSS/DSCN MKR DOCD: CPT | Performed by: ORTHOPAEDIC SURGERY

## 2019-06-27 PROCEDURE — 1036F TOBACCO NON-USER: CPT | Performed by: ORTHOPAEDIC SURGERY

## 2019-06-27 NOTE — LETTER
that identifies a participant in any way (including job, ethnicity, Alevism, etc.) can be shared outside of this group setting. I have read and agree to the following statements:        · I agree to meet with a group of patients and my doctor. I understand that I have the choice to be seen by my physician in this group or individually and that I may leave the group visit anytime I feel uncomfortable. · I understand that discussions will occur regarding individual identifiable health information during the shared medical appointment and I will maintain the confidentiality of [facility] health information or other information heard during the appointment. · I am committed to maintaining this confidentiality even if I am no longer participating in shared medical appointments. · I understand that the information I share with the physician and staff will be heard by the other participants and there is a possibility that the information disclosed in the shared medical appointment may be re-disclosed by other participants. I have been notified of this potential disclosure and I voluntarily agree to participate in the shared medical appointment. · I know that I dont have to share any personal information with the group or health care providers unless, I choose to do so. · Like any doctors appointment, I agree to be responsible for the bill and / or co-payment associated with this physician appointment. Shared Medical Appointment              THIS SUPPLEMENTAL NOTICE SUPPLEMENTS AND DOES NOT REPLACE THE [facility] CONSENT, PATIENT RESPONSIBILITY AND NOTICE OF PRIVACY PRACTICE.         Justin Lawson    1945        Patients Signature: ____________________________________________________         DATE: ____/____/___      Chu Miller / Support Persons Signature (if applicable) _________________________     DATE: __/__/__ **Each person will be asked to sign this commitment before each Shared Medical Appointment. Thank You ! SURGERY SCHEDULING TIMES                                                                                                                                                      Justin Lawson                                                                                       1945                                                                           SURGERY DATE: ___/___/___                                                                      SURGERY TIME:  ___:___ AM/PM                                                                      ARRIVAL TIME:   ___:___  AM/PM                                                                                                                       **REPORT TO THE INFORMATION                                                  DESK IN THE MAIN LOBBY OF [id number] [facility] [place]  Surgery Scheduler    [facility]) Physicians  [facility]  [address] [place] [address], [address] [place] [address]  [place]    [phone]  Phone                                                                       JET INFO         PT NAME:  Justin Lawson              Patient Phone:  [phone] (home) [phone] (work)                                          1945    PCP:  PCP:  Jose G Scruggs MD                APPT DATE:  ___/___/___      DATE:  19        H&P __________    LABS: _________                      NEEDED:  __________    EKG:   _________                     NEEDED:  __________      JET DATE:   _______/_______      SURG DATE:   ________/________

## 2019-06-27 NOTE — PROGRESS NOTES
RAPT  RISK ASSESSMENT and PREDICTION TOOL    Name: Ozzie Rodriguez  YOB: 1945  Surgeon: Dr Zoltan Cleaning         Value Score    1). What is your age group? 50 - 65 years  = 2      66 - 75 years = 1     > 75 years = 0       Your score = 1   2). Gender? Male = 2     Female = 1       Your score = 2   3). How far on average can you walk? Two blocks or more (+/- rest) = 2    (a block is 200 [id number]=851 ft)  1 - 2 blocks (+/- rest) = 1     Housebound (most of time) = 0       Your score = 1   4). Which gait aid do you use? None = 2    (more often than not) Single-point stick = 1     Crutches/walker = 0       Your score = 0   5). Do you use community supports? None or one per week = 1    (home help, meals on wheels, district nursing) Two or more per week = 0       Your score = 0   6). Will you live with someone who can care for you after your operation? yes = 3     no = 0       Your score = 0    Your Total Score (out of 12) = 4       Key: Destination at discharge from acute care predicted by score.   Score < 6  = extended inpatient rehabilitation  Score 6 - 9  = additional intervention to discharge directly home (Rehab in the home)  Score > 9  = directly home      Patient's Preference Prediction Score Agreed destination   [facility] 4 yes   Ozzie Rodriguez  Date: 6/27/19

## 2019-06-28 ENCOUNTER — TELEPHONE (OUTPATIENT)
Dept: ORTHOPEDIC SURGERY | Age: 74
End: 2019-06-28

## 2019-06-28 ENCOUNTER — TELEPHONE (OUTPATIENT)
Dept: INTERNAL MEDICINE CLINIC | Age: 74
End: 2019-06-28

## 2019-06-28 LAB
ESTIMATED AVERAGE GLUCOSE: 79.6 MG/DL
HBA1C MFR BLD: 4.4 %

## 2019-06-28 NOTE — TELEPHONE ENCOUNTER
Vitamin D level is low at 21.4. Instructed patient to take over-the-counter Vitamin D 5217-5363 IUs daily.

## 2019-07-01 ENCOUNTER — TELEPHONE (OUTPATIENT)
Dept: INTERNAL MEDICINE CLINIC | Age: 74
End: 2019-07-01

## 2019-07-05 NOTE — PROGRESS NOTES
Toño Valadez MD   triamterene-hydrochlorothiazide (MAXZIDE-25) 37.5-25 MG per tablet TAKE 1 Skyler Garcia MD   clotrimazole-betamethasone (LOTRISONE) 1-0.05 % cream Apply topically 2 times daily. Orlando Nuñez MD   hydroxyurea (HYDREA) 500 MG chemo capsule TAKE TWO CAPSULES BY MOUTH DAILY  Joni Fontana MD   BABY ASPIRIN PO Take by mouth  Historical Provider, MD   fish oil-omega-3 fatty acids 1000 MG capsule Take 2 g by mouth daily. Historical Provider, MD   IRON PO Take 65 mg by mouth daily. Historical Provider, MD     Physical examination 79-year-old male oriented ×3 temperature is 97.3. Examination of his back shows reduced range of motion but no specific pain tenderness or instability. Motor exam to the right lower extremity shows quadriceps hamstrings hip abductors and abductors foot plantar dorsiflexes are intact 4-5 over 5. Sensation perfusion are intact to the right foot and ankle. There is no numbness or tingling noted in the right lower extremity. Deep tendon reflexes are 0 to knee and 0 to ankle of the right lower extremity. No obvious cutaneous lesions lipedema or cellulitic processes are seen in the right lower extremity. Examination of the right hip shows pain with high degrees of flexion and internal rotation. There are no signs of instability or deep sepsis noted in the right hip joint. X-rays in the past showed degenerative osteoarthritis of the right hip. Impression 79-year-old male with advanced degenerative osteoarthritis of the right hip. We had a 35 minute face-to-face discussion of which greater than 50% of the time was spent in counseling with this patient concerning Right total hip arthroplasty it's preoperative evaluation and its postoperative pain management and follow-up.   We went over the surgical procedure risks and complications including bleeding, infection,  neurovascular injury, decreased range of motion, persistent pain, instability, DVT, pulmonary embolism, leg length inequality, change in mental status, and need for further surgical procedures. The patient understands this and we have answered all of their questions and concerns. We will start his preoperative optimization and evaluation and follow-up in a shared medical appointment. We will plan to do this through direct lateral approach.

## 2019-07-10 RX ORDER — DICLOFENAC SODIUM 75 MG/1
TABLET, DELAYED RELEASE ORAL
Qty: 60 TABLET | Refills: 1 | Status: ON HOLD | OUTPATIENT
Start: 2019-07-10 | End: 2019-08-20 | Stop reason: HOSPADM

## 2019-07-29 ENCOUNTER — HOSPITAL ENCOUNTER (OUTPATIENT)
Dept: PHYSICAL THERAPY | Age: 74
Setting detail: THERAPIES SERIES
Discharge: HOME | End: 2019-07-29
Payer: MEDICARE

## 2019-07-29 ENCOUNTER — TELEPHONE (OUTPATIENT)
Dept: ORTHOPEDICS UNIT | Age: 74
End: 2019-07-29

## 2019-07-29 PROCEDURE — 97110 THERAPEUTIC EXERCISES: CPT

## 2019-07-29 PROCEDURE — 97161 PT EVAL LOW COMPLEX 20 MIN: CPT

## 2019-07-29 NOTE — TELEPHONE ENCOUNTER
concern? Not Applicable    Weight Loss Clinic Consult Ordered: Not Applicable    Date of Wt Loss Clinic Appt:     BMI at time of surgery (if went through [facility]): Additional Medical Concerns:     Sleep Apnea    Additional Recommendations for above concerns:   stopped using mask but has a bed that has raised - pcp aware of patient not using cpap. Per Dr Elisabeth Barrett not required prior to surgery but test recommended. Discharge Disposition Information:     Attended Pre-Hab Program: yes     Anticipated Discharge Disposition:  SNF per phone assessment     Who will be with patient at home following discharge?   wife      Equipment pt already has:  Walker, Raised Toilet Seat, Shower Chair and Cane, [employer] on first or second floor: first   Bathroom on first or second floor: first   Weight bearing status: wbat   Pre-op ambulatory status: independent with 4 point cane   Number of entry steps: 2   Caregiver assistance: wife as needed       Lara Marin  7/29/2019                 Electronically signed by Lara Marin RN on 8/19/2019 at 12:04 PM

## 2019-07-29 NOTE — DISCHARGE SUMMARY
Physical Therapy Daily Treatment Note  Date:  2019    Patient Name:  Talia Jacobs    :  1945  MRN: [id number]  Restrictions/Precautions:    Pertinent Medical History:  Medical/Treatment Diagnosis Information:  · Diagnosis: Primary osteoarthritis of right hip (M16.11 ICD-10-CM)  · Treatment Diagnosis: Gait and mobility disturbance due to right hip OA  Insurance/Certification information:     Physician Information:  Referring Practitioner: Dr Katie Guajardo of care signed (Y/N):  Routed 19  Visit# / total visits:  1  Pain level: 4-8/10     G-Code (if applicable):      Date / Visit # G-Code Applied:  /       Progress Note: []  Yes  [x]  No  Next due by: Visit #10      History of Injury:See initial evaluation    Subjective:   See initial evaluation  Objective:  Observation:   Test measurements:  See initial evaluation    Exercises: Pt has an adjustable bed and is able to set it at an inclined position   Exercise/Equipment Resistance/Repetitions Other comments   Gluteal sets 5\" x 15    PPT 10\" x 10    Heel slides 10 x    Calf towel stretch  30\" x 4  Bed inclined   Seated hams stretch 30\" x 3-5 Bed inclined   Seated quad sets 10\" x 10 Bed inclined   LAQ 10 x    Mini squats 10 x                                     Other Therapeutic Activities:      Home Exercise Program:    The patient demonstrated good tolerance to and understanding of the HEP. Written instructions have been issued.     Manual Treatments:      Modalities:      Timed Code Treatment Minutes:  30    Total Treatment Minutes:  60    Assessment  [] Patient tolerated treatment well [x] Patient limited by fatigue  [] Patient limited by pain  [] Patient limited by other medical complications  [] Other:         Prognosis: [] Good [x] Fair  [] Poor    Patient Requires Follow-up: [x] Yes  [] No    Plan:   [] Continue per plan of care [] Alter current plan (see comments)  [] Plan of care initiated [] Hold pending MD visit [x] Discharge  Plan for Next

## 2019-08-06 ENCOUNTER — PREP FOR PROCEDURE (OUTPATIENT)
Dept: ORTHOPEDIC SURGERY | Age: 74
End: 2019-08-06

## 2019-08-06 ENCOUNTER — HOSPITAL ENCOUNTER (OUTPATIENT)
Dept: PREADMISSION TESTING | Age: 74
Discharge: HOME | End: 2019-08-10
Payer: MEDICARE

## 2019-08-06 DIAGNOSIS — M16.11 PRIMARY OSTEOARTHRITIS OF RIGHT HIP: ICD-10-CM

## 2019-08-06 DIAGNOSIS — M16.11 PRIMARY OSTEOARTHRITIS OF RIGHT HIP: Primary | ICD-10-CM

## 2019-08-06 LAB
ABO/RH: NORMAL
ALBUMIN SERPL-MCNC: 4.5 G/DL (ref 3.4–5)
ANION GAP SERPL CALCULATED.3IONS-SCNC: 12 MMOL/L (ref 3–16)
ANTIBODY SCREEN: NORMAL
APTT: 33.6 SEC (ref 26–36)
BASOPHILS ABSOLUTE: 0.1 K/UL (ref 0–0.2)
BASOPHILS RELATIVE PERCENT: 1.1 %
BILIRUBIN URINE: NEGATIVE
BLOOD, URINE: NEGATIVE
BUN BLDV-MCNC: 16 MG/DL (ref 7–20)
CALCIUM SERPL-MCNC: 9.4 MG/DL (ref 8.3–10.6)
CHLORIDE BLD-SCNC: 104 MMOL/L (ref 99–110)
CLARITY: CLEAR
CO2: 26 MMOL/L (ref 21–32)
COLOR: YELLOW
CREAT SERPL-MCNC: 1 MG/DL (ref 0.8–1.3)
EKG ATRIAL RATE: 74 BPM
EKG DIAGNOSIS: NORMAL
EKG P AXIS: 50 DEGREES
EKG P-R INTERVAL: 178 MS
EKG Q-T INTERVAL: 390 MS
EKG QRS DURATION: 94 MS
EKG QTC CALCULATION (BAZETT): 432 MS
EKG R AXIS: 55 DEGREES
EKG T AXIS: 31 DEGREES
EKG VENTRICULAR RATE: 74 BPM
EOSINOPHILS ABSOLUTE: 0.2 K/UL (ref 0–0.6)
EOSINOPHILS RELATIVE PERCENT: 4.5 %
GFR AFRICAN AMERICAN: >60
GFR NON-AFRICAN AMERICAN: >60
GLUCOSE BLD-MCNC: 89 MG/DL (ref 70–99)
GLUCOSE URINE: NEGATIVE MG/DL
HCT VFR BLD CALC: 37.5 % (ref 40.5–52.5)
HEMOGLOBIN: 13.2 G/DL (ref 13.5–17.5)
INR BLD: 1.1 (ref 0.86–1.14)
KETONES, URINE: NEGATIVE MG/DL
LEUKOCYTE ESTERASE, URINE: NEGATIVE
LYMPHOCYTES ABSOLUTE: 1 K/UL (ref 1–5.1)
LYMPHOCYTES RELATIVE PERCENT: 18.5 %
MCH RBC QN AUTO: 36.4 PG (ref 26–34)
MCHC RBC AUTO-ENTMCNC: 35.1 G/DL (ref 31–36)
MCV RBC AUTO: 103.7 FL (ref 80–100)
MICROSCOPIC EXAMINATION: NORMAL
MONOCYTES ABSOLUTE: 0.4 K/UL (ref 0–1.3)
MONOCYTES RELATIVE PERCENT: 7.3 %
MRSA SCREEN RT-PCR: NORMAL
NEUTROPHILS ABSOLUTE: 3.6 K/UL (ref 1.7–7.7)
NEUTROPHILS RELATIVE PERCENT: 68.6 %
NITRITE, URINE: NEGATIVE
PDW BLD-RTO: 15.6 % (ref 12.4–15.4)
PH UA: 6 (ref 5–8)
PLATELET # BLD: 287 K/UL (ref 135–450)
PMV BLD AUTO: 6.7 FL (ref 5–10.5)
POTASSIUM SERPL-SCNC: 3.6 MMOL/L (ref 3.5–5.1)
PREALBUMIN: 31.7 MG/DL (ref 20–40)
PROTEIN UA: NEGATIVE MG/DL
PROTHROMBIN TIME: 12.5 SEC (ref 9.8–13)
RBC # BLD: 3.61 M/UL (ref 4.2–5.9)
SEDIMENTATION RATE, ERYTHROCYTE: 17 MM/HR (ref 0–20)
SODIUM BLD-SCNC: 142 MMOL/L (ref 136–145)
SPECIFIC GRAVITY UA: 1.02 (ref 1–1.03)
URINE REFLEX TO CULTURE: NORMAL
URINE TYPE: NORMAL
UROBILINOGEN, URINE: 1 E.U./DL
VITAMIN D 25-HYDROXY: 27.6 NG/ML
WBC # BLD: 5.2 K/UL (ref 4–11)

## 2019-08-06 PROCEDURE — 85730 THROMBOPLASTIN TIME PARTIAL: CPT

## 2019-08-06 PROCEDURE — 84134 ASSAY OF PREALBUMIN: CPT

## 2019-08-06 PROCEDURE — 86900 BLOOD TYPING SEROLOGIC ABO: CPT

## 2019-08-06 PROCEDURE — 81003 URINALYSIS AUTO W/O SCOPE: CPT

## 2019-08-06 PROCEDURE — 87641 MR-STAPH DNA AMP PROBE: CPT

## 2019-08-06 PROCEDURE — 85025 COMPLETE CBC W/AUTO DIFF WBC: CPT

## 2019-08-06 PROCEDURE — 82040 ASSAY OF SERUM ALBUMIN: CPT

## 2019-08-06 PROCEDURE — 85610 PROTHROMBIN TIME: CPT

## 2019-08-06 PROCEDURE — 93010 ELECTROCARDIOGRAM REPORT: CPT | Performed by: INTERNAL MEDICINE

## 2019-08-06 PROCEDURE — 80048 BASIC METABOLIC PNL TOTAL CA: CPT

## 2019-08-06 PROCEDURE — 83036 HEMOGLOBIN GLYCOSYLATED A1C: CPT

## 2019-08-06 PROCEDURE — 86850 RBC ANTIBODY SCREEN: CPT

## 2019-08-06 PROCEDURE — 82306 VITAMIN D 25 HYDROXY: CPT

## 2019-08-06 PROCEDURE — 93005 ELECTROCARDIOGRAM TRACING: CPT

## 2019-08-06 PROCEDURE — 86901 BLOOD TYPING SEROLOGIC RH(D): CPT

## 2019-08-06 PROCEDURE — 85652 RBC SED RATE AUTOMATED: CPT

## 2019-08-06 NOTE — PROGRESS NOTES
Pt. Attended JET class on 8/9/19. Pt. Verified surgery for Total HIP replacement and received patient information and educational JET folder. Interviews completed by PT, OT, Case management and PAT. Labs and Tests completed as ordered/necessary. Pt. Given instructions on Pre-operative Showering techniques and the use of anti-septic 3 days before surgery. Anti-septic bottle given to patient to take home. Pt. States no further questions or concerns.

## 2019-08-07 LAB
ESTIMATED AVERAGE GLUCOSE: 76.7 MG/DL
HBA1C MFR BLD: 4.3 %

## 2019-08-08 ENCOUNTER — PREP FOR PROCEDURE (OUTPATIENT)
Dept: ORTHOPEDICS UNIT | Age: 74
End: 2019-08-08

## 2019-08-12 ENCOUNTER — OFFICE VISIT (OUTPATIENT)
Dept: INTERNAL MEDICINE CLINIC | Age: 74
End: 2019-08-12
Payer: MEDICARE

## 2019-08-12 VITALS
HEART RATE: 87 BPM | HEIGHT: 72 IN | BODY MASS INDEX: 36.16 KG/M2 | OXYGEN SATURATION: 98 % | TEMPERATURE: 97.8 F | RESPIRATION RATE: 18 BRPM | SYSTOLIC BLOOD PRESSURE: 130 MMHG | DIASTOLIC BLOOD PRESSURE: 70 MMHG | WEIGHT: 267 LBS

## 2019-08-12 DIAGNOSIS — D47.3 ESSENTIAL THROMBOCYTHEMIA (HCC): ICD-10-CM

## 2019-08-12 DIAGNOSIS — I10 ESSENTIAL HYPERTENSION, BENIGN: ICD-10-CM

## 2019-08-12 DIAGNOSIS — Z01.818 PREOP EXAMINATION: ICD-10-CM

## 2019-08-12 DIAGNOSIS — Z96.642 HISTORY OF TOTAL LEFT HIP ARTHROPLASTY: ICD-10-CM

## 2019-08-12 DIAGNOSIS — M16.11 PRIMARY OSTEOARTHRITIS OF RIGHT HIP: Primary | ICD-10-CM

## 2019-08-12 PROCEDURE — G8427 DOCREV CUR MEDS BY ELIG CLIN: HCPCS | Performed by: INTERNAL MEDICINE

## 2019-08-12 PROCEDURE — G8417 CALC BMI ABV UP PARAM F/U: HCPCS | Performed by: INTERNAL MEDICINE

## 2019-08-12 PROCEDURE — 99214 OFFICE O/P EST MOD 30 MIN: CPT | Performed by: INTERNAL MEDICINE

## 2019-08-12 RX ORDER — OXYCODONE HYDROCHLORIDE 5 MG/1
10 TABLET ORAL ONCE
Status: CANCELLED | OUTPATIENT
Start: 2019-08-12 | End: 2019-08-12

## 2019-08-12 RX ORDER — CELECOXIB 200 MG/1
200 CAPSULE ORAL ONCE
Status: CANCELLED | OUTPATIENT
Start: 2019-08-12 | End: 2019-08-12

## 2019-08-12 ASSESSMENT — ENCOUNTER SYMPTOMS
SORE THROAT: 0
RHINORRHEA: 0
GASTROINTESTINAL NEGATIVE: 1
TROUBLE SWALLOWING: 0
SHORTNESS OF BREATH: 0
WHEEZING: 0
CHEST TIGHTNESS: 0
COUGH: 0

## 2019-08-13 ENCOUNTER — TELEPHONE (OUTPATIENT)
Dept: INTERNAL MEDICINE CLINIC | Age: 74
End: 2019-08-13

## 2019-08-13 ENCOUNTER — TELEPHONE (OUTPATIENT)
Dept: ORTHOPEDICS UNIT | Age: 74
End: 2019-08-13

## 2019-08-14 NOTE — TELEPHONE ENCOUNTER
Is there any one else you would recommend that might be able to get him in sooner?     Please review and advise

## 2019-08-15 ENCOUNTER — OFFICE VISIT (OUTPATIENT)
Dept: ORTHOPEDIC SURGERY | Age: 74
End: 2019-08-15

## 2019-08-15 DIAGNOSIS — M16.11 PRIMARY OSTEOARTHRITIS OF RIGHT HIP: Primary | ICD-10-CM

## 2019-08-15 PROCEDURE — 99999 PR OFFICE/OUTPT VISIT,PROCEDURE ONLY: CPT | Performed by: ORTHOPAEDIC SURGERY

## 2019-08-15 NOTE — PROGRESS NOTES
Vickie Giron is here to discuss surgical options. I had a 45 minute group discussion with greater than 50% of the time counseling the patient regarding joint arthroplasty, its preoperative evaluation, and post operative physical therapy, and pain managaement We went over the surgical procedure of hip replacement risks and complications of hip replacement including bleeding, infection, fracture, dislocation, instability, persistent pain, leg length discrepancy, neurovascular injury,  DVT, pulmonary embolism, post operative cognitive disorders, and need for revision. We also discussed the choice of implant and the hardware model was reviewed with the patient. He  understands these and we will see the patient in the operating room. There were no vitals taken for this visit. As this patient has demonstrated risk factors for osteoporosis, such as age greater than [de-identified] years and evidence of a fracture, I have referred the patient back to the primary care physician for evaluation for osteoporosis, including consideration for DEXA scanning, if this is felt to be clinically indicated. The patient is advised to contact the primary care physician to follow-up for further evaluation. Plan:  right total hip arthroplasty to be tenatively performed on 8/20/19. Please inform the patient's primary care provider.

## 2019-08-16 ENCOUNTER — TELEPHONE (OUTPATIENT)
Dept: INTERNAL MEDICINE CLINIC | Age: 74
End: 2019-08-16

## 2019-08-19 ENCOUNTER — ANESTHESIA EVENT (OUTPATIENT)
Dept: OPERATING ROOM | Age: 74
DRG: 470 | End: 2019-08-19
Payer: MEDICARE

## 2019-08-20 ENCOUNTER — HOSPITAL ENCOUNTER (INPATIENT)
Age: 74
LOS: 1 days | Discharge: NURSING, REHAB OR LONG-TERM CARE | DRG: 470 | End: 2019-08-21
Attending: ORTHOPAEDIC SURGERY | Admitting: ORTHOPAEDIC SURGERY
Payer: MEDICARE

## 2019-08-20 ENCOUNTER — ANESTHESIA (OUTPATIENT)
Dept: OPERATING ROOM | Age: 74
DRG: 470 | End: 2019-08-20
Payer: MEDICARE

## 2019-08-20 ENCOUNTER — APPOINTMENT (OUTPATIENT)
Dept: GENERAL RADIOLOGY | Age: 74
DRG: 470 | End: 2019-08-20
Attending: ORTHOPAEDIC SURGERY
Payer: MEDICARE

## 2019-08-20 VITALS
OXYGEN SATURATION: 97 % | DIASTOLIC BLOOD PRESSURE: 54 MMHG | TEMPERATURE: 96.1 F | SYSTOLIC BLOOD PRESSURE: 87 MMHG | RESPIRATION RATE: 1 BRPM

## 2019-08-20 DIAGNOSIS — M16.11 ARTHRITIS OF RIGHT HIP: ICD-10-CM

## 2019-08-20 LAB
ABO/RH: NORMAL
ANTIBODY SCREEN: NORMAL
GLUCOSE BLD-MCNC: 124 MG/DL (ref 70–99)
GLUCOSE BLD-MCNC: 127 MG/DL (ref 70–99)
PERFORMED ON: ABNORMAL
PERFORMED ON: ABNORMAL

## 2019-08-20 PROCEDURE — 3600000005 HC SURGERY LEVEL 5 BASE: Performed by: ORTHOPAEDIC SURGERY

## 2019-08-20 PROCEDURE — 6360000002 HC RX W HCPCS: Performed by: ORTHOPAEDIC SURGERY

## 2019-08-20 PROCEDURE — 3700000001 HC ADD 15 MINUTES (ANESTHESIA): Performed by: ORTHOPAEDIC SURGERY

## 2019-08-20 PROCEDURE — 2500000003 HC RX 250 WO HCPCS: Performed by: ORTHOPAEDIC SURGERY

## 2019-08-20 PROCEDURE — 3209999900 FLUORO FOR SURGICAL PROCEDURES

## 2019-08-20 PROCEDURE — 2500000003 HC RX 250 WO HCPCS: Performed by: NURSE ANESTHETIST, CERTIFIED REGISTERED

## 2019-08-20 PROCEDURE — 6370000000 HC RX 637 (ALT 250 FOR IP): Performed by: ORTHOPAEDIC SURGERY

## 2019-08-20 PROCEDURE — 7100000000 HC PACU RECOVERY - FIRST 15 MIN: Performed by: ORTHOPAEDIC SURGERY

## 2019-08-20 PROCEDURE — 73502 X-RAY EXAM HIP UNI 2-3 VIEWS: CPT

## 2019-08-20 PROCEDURE — C1776 JOINT DEVICE (IMPLANTABLE): HCPCS | Performed by: ORTHOPAEDIC SURGERY

## 2019-08-20 PROCEDURE — 86901 BLOOD TYPING SEROLOGIC RH(D): CPT

## 2019-08-20 PROCEDURE — 2720000010 HC SURG SUPPLY STERILE: Performed by: ORTHOPAEDIC SURGERY

## 2019-08-20 PROCEDURE — 2709999900 HC NON-CHARGEABLE SUPPLY: Performed by: ORTHOPAEDIC SURGERY

## 2019-08-20 PROCEDURE — 2580000003 HC RX 258: Performed by: ANESTHESIOLOGY

## 2019-08-20 PROCEDURE — 3700000000 HC ANESTHESIA ATTENDED CARE: Performed by: ORTHOPAEDIC SURGERY

## 2019-08-20 PROCEDURE — 86850 RBC ANTIBODY SCREEN: CPT

## 2019-08-20 PROCEDURE — 88311 DECALCIFY TISSUE: CPT

## 2019-08-20 PROCEDURE — 6360000002 HC RX W HCPCS: Performed by: NURSE ANESTHETIST, CERTIFIED REGISTERED

## 2019-08-20 PROCEDURE — 2500000003 HC RX 250 WO HCPCS

## 2019-08-20 PROCEDURE — 86900 BLOOD TYPING SEROLOGIC ABO: CPT

## 2019-08-20 PROCEDURE — 2580000003 HC RX 258: Performed by: NURSE ANESTHETIST, CERTIFIED REGISTERED

## 2019-08-20 PROCEDURE — 88304 TISSUE EXAM BY PATHOLOGIST: CPT

## 2019-08-20 PROCEDURE — 1200000000 HC SEMI PRIVATE

## 2019-08-20 PROCEDURE — 2580000003 HC RX 258: Performed by: ORTHOPAEDIC SURGERY

## 2019-08-20 PROCEDURE — 3600000015 HC SURGERY LEVEL 5 ADDTL 15MIN: Performed by: ORTHOPAEDIC SURGERY

## 2019-08-20 PROCEDURE — 7100000001 HC PACU RECOVERY - ADDTL 15 MIN: Performed by: ORTHOPAEDIC SURGERY

## 2019-08-20 PROCEDURE — 6360000002 HC RX W HCPCS: Performed by: ANESTHESIOLOGY

## 2019-08-20 PROCEDURE — 2700000000 HC OXYGEN THERAPY PER DAY

## 2019-08-20 DEVICE — CUP ACET DIA56MM HIP GRIPTION PRI CEMENTLESS FIX SECT SER: Type: IMPLANTABLE DEVICE | Site: HIP | Status: FUNCTIONAL

## 2019-08-20 DEVICE — STEM FEM SZ 5 L105MM 12/14 TAPR HI OFFSET HIP DUOFIX CLLRD: Type: IMPLANTABLE DEVICE | Site: HIP | Status: FUNCTIONAL

## 2019-08-20 DEVICE — LINER ACET OD56MM ID40MM +4MM OFFSET HIP POLYETH MTL ON: Type: IMPLANTABLE DEVICE | Site: HIP | Status: FUNCTIONAL

## 2019-08-20 DEVICE — HEAD FEM DIA40MM +1.5MM OFFSET 12/14 TAPR ARTC EZ HIP MTL: Type: IMPLANTABLE DEVICE | Site: HIP | Status: FUNCTIONAL

## 2019-08-20 RX ORDER — OXYCODONE HYDROCHLORIDE AND ACETAMINOPHEN 5; 325 MG/1; MG/1
1 TABLET ORAL PRN
Status: DISCONTINUED | OUTPATIENT
Start: 2019-08-20 | End: 2019-08-20 | Stop reason: HOSPADM

## 2019-08-20 RX ORDER — MORPHINE SULFATE 2 MG/ML
2 INJECTION, SOLUTION INTRAMUSCULAR; INTRAVENOUS
Status: DISCONTINUED | OUTPATIENT
Start: 2019-08-20 | End: 2019-08-21 | Stop reason: HOSPADM

## 2019-08-20 RX ORDER — SODIUM CHLORIDE 9 MG/ML
INJECTION, SOLUTION INTRAVENOUS CONTINUOUS
Status: DISCONTINUED | OUTPATIENT
Start: 2019-08-20 | End: 2019-08-20

## 2019-08-20 RX ORDER — MIDAZOLAM HYDROCHLORIDE 1 MG/ML
INJECTION INTRAMUSCULAR; INTRAVENOUS PRN
Status: DISCONTINUED | OUTPATIENT
Start: 2019-08-20 | End: 2019-08-20 | Stop reason: SDUPTHER

## 2019-08-20 RX ORDER — ACETAMINOPHEN 325 MG/1
650 TABLET ORAL EVERY 6 HOURS
Status: DISCONTINUED | OUTPATIENT
Start: 2019-08-20 | End: 2019-08-21 | Stop reason: HOSPADM

## 2019-08-20 RX ORDER — HYDRALAZINE HYDROCHLORIDE 20 MG/ML
5 INJECTION INTRAMUSCULAR; INTRAVENOUS
Status: DISCONTINUED | OUTPATIENT
Start: 2019-08-20 | End: 2019-08-20 | Stop reason: HOSPADM

## 2019-08-20 RX ORDER — ROCURONIUM BROMIDE 10 MG/ML
INJECTION, SOLUTION INTRAVENOUS PRN
Status: DISCONTINUED | OUTPATIENT
Start: 2019-08-20 | End: 2019-08-20 | Stop reason: SDUPTHER

## 2019-08-20 RX ORDER — MORPHINE SULFATE 2 MG/ML
2 INJECTION, SOLUTION INTRAMUSCULAR; INTRAVENOUS EVERY 5 MIN PRN
Status: DISCONTINUED | OUTPATIENT
Start: 2019-08-20 | End: 2019-08-20 | Stop reason: HOSPADM

## 2019-08-20 RX ORDER — TAMSULOSIN HYDROCHLORIDE 0.4 MG/1
0.4 CAPSULE ORAL DAILY
Status: DISCONTINUED | OUTPATIENT
Start: 2019-08-21 | End: 2019-08-21 | Stop reason: HOSPADM

## 2019-08-20 RX ORDER — FERROUS SULFATE TAB EC 324 MG (65 MG FE EQUIVALENT) 324 (65 FE) MG
324 TABLET DELAYED RESPONSE ORAL DAILY
Status: DISCONTINUED | OUTPATIENT
Start: 2019-08-21 | End: 2019-08-21 | Stop reason: HOSPADM

## 2019-08-20 RX ORDER — FAMOTIDINE 20 MG/1
20 TABLET, FILM COATED ORAL 2 TIMES DAILY PRN
Status: DISCONTINUED | OUTPATIENT
Start: 2019-08-20 | End: 2019-08-21 | Stop reason: HOSPADM

## 2019-08-20 RX ORDER — ASPIRIN 81 MG/1
81 TABLET ORAL 2 TIMES DAILY
Qty: 60 TABLET | Refills: 0 | Status: SHIPPED | OUTPATIENT
Start: 2019-08-20 | End: 2022-01-21

## 2019-08-20 RX ORDER — LORAZEPAM 2 MG/ML
0.5 INJECTION INTRAMUSCULAR EVERY 10 MIN PRN
Status: COMPLETED | OUTPATIENT
Start: 2019-08-20 | End: 2019-08-20

## 2019-08-20 RX ORDER — DOCUSATE SODIUM 100 MG/1
100 CAPSULE, LIQUID FILLED ORAL 2 TIMES DAILY
Status: DISCONTINUED | OUTPATIENT
Start: 2019-08-20 | End: 2019-08-21 | Stop reason: HOSPADM

## 2019-08-20 RX ORDER — MORPHINE SULFATE 2 MG/ML
1 INJECTION, SOLUTION INTRAMUSCULAR; INTRAVENOUS EVERY 5 MIN PRN
Status: DISCONTINUED | OUTPATIENT
Start: 2019-08-20 | End: 2019-08-20 | Stop reason: HOSPADM

## 2019-08-20 RX ORDER — SODIUM CHLORIDE 0.9 % (FLUSH) 0.9 %
10 SYRINGE (ML) INJECTION EVERY 12 HOURS SCHEDULED
Status: DISCONTINUED | OUTPATIENT
Start: 2019-08-20 | End: 2019-08-20 | Stop reason: HOSPADM

## 2019-08-20 RX ORDER — FENTANYL CITRATE 50 UG/ML
INJECTION, SOLUTION INTRAMUSCULAR; INTRAVENOUS PRN
Status: DISCONTINUED | OUTPATIENT
Start: 2019-08-20 | End: 2019-08-20 | Stop reason: SDUPTHER

## 2019-08-20 RX ORDER — SODIUM CHLORIDE 450 MG/100ML
INJECTION, SOLUTION INTRAVENOUS CONTINUOUS
Status: DISCONTINUED | OUTPATIENT
Start: 2019-08-20 | End: 2019-08-21

## 2019-08-20 RX ORDER — SUCCINYLCHOLINE/SOD CL,ISO/PF 200MG/10ML
SYRINGE (ML) INTRAVENOUS PRN
Status: DISCONTINUED | OUTPATIENT
Start: 2019-08-20 | End: 2019-08-20 | Stop reason: SDUPTHER

## 2019-08-20 RX ORDER — INSULIN GLARGINE 100 [IU]/ML
0.25 INJECTION, SOLUTION SUBCUTANEOUS NIGHTLY
Status: DISCONTINUED | OUTPATIENT
Start: 2019-08-20 | End: 2019-08-21

## 2019-08-20 RX ORDER — SODIUM CHLORIDE 9 MG/ML
INJECTION, SOLUTION INTRAVENOUS CONTINUOUS PRN
Status: DISCONTINUED | OUTPATIENT
Start: 2019-08-20 | End: 2019-08-20 | Stop reason: SDUPTHER

## 2019-08-20 RX ORDER — ONDANSETRON 2 MG/ML
4 INJECTION INTRAMUSCULAR; INTRAVENOUS EVERY 6 HOURS PRN
Status: DISCONTINUED | OUTPATIENT
Start: 2019-08-20 | End: 2019-08-21 | Stop reason: HOSPADM

## 2019-08-20 RX ORDER — OXYCODONE HYDROCHLORIDE 10 MG/1
10 TABLET ORAL EVERY 4 HOURS PRN
Status: DISCONTINUED | OUTPATIENT
Start: 2019-08-20 | End: 2019-08-21 | Stop reason: HOSPADM

## 2019-08-20 RX ORDER — DEXTROSE MONOHYDRATE 25 G/50ML
12.5 INJECTION, SOLUTION INTRAVENOUS PRN
Status: DISCONTINUED | OUTPATIENT
Start: 2019-08-20 | End: 2019-08-21

## 2019-08-20 RX ORDER — LABETALOL HYDROCHLORIDE 5 MG/ML
5 INJECTION, SOLUTION INTRAVENOUS EVERY 10 MIN PRN
Status: DISCONTINUED | OUTPATIENT
Start: 2019-08-20 | End: 2019-08-20 | Stop reason: HOSPADM

## 2019-08-20 RX ORDER — EPHEDRINE SULFATE/0.9% NACL/PF 50 MG/5 ML
SYRINGE (ML) INTRAVENOUS PRN
Status: DISCONTINUED | OUTPATIENT
Start: 2019-08-20 | End: 2019-08-20 | Stop reason: SDUPTHER

## 2019-08-20 RX ORDER — HYDROXYUREA 500 MG/1
500 CAPSULE ORAL DAILY
Status: DISCONTINUED | OUTPATIENT
Start: 2019-08-21 | End: 2019-08-21 | Stop reason: HOSPADM

## 2019-08-20 RX ORDER — OXYCODONE HYDROCHLORIDE 5 MG/1
10 TABLET ORAL ONCE
Status: COMPLETED | OUTPATIENT
Start: 2019-08-20 | End: 2019-08-20

## 2019-08-20 RX ORDER — SODIUM CHLORIDE 0.9 % (FLUSH) 0.9 %
10 SYRINGE (ML) INJECTION PRN
Status: DISCONTINUED | OUTPATIENT
Start: 2019-08-20 | End: 2019-08-21 | Stop reason: HOSPADM

## 2019-08-20 RX ORDER — TRIAMTERENE AND HYDROCHLOROTHIAZIDE 37.5; 25 MG/1; MG/1
1 TABLET ORAL DAILY
Status: DISCONTINUED | OUTPATIENT
Start: 2019-08-21 | End: 2019-08-21 | Stop reason: HOSPADM

## 2019-08-20 RX ORDER — DEXAMETHASONE SODIUM PHOSPHATE 4 MG/ML
INJECTION, SOLUTION INTRA-ARTICULAR; INTRALESIONAL; INTRAMUSCULAR; INTRAVENOUS; SOFT TISSUE PRN
Status: DISCONTINUED | OUTPATIENT
Start: 2019-08-20 | End: 2019-08-20 | Stop reason: SDUPTHER

## 2019-08-20 RX ORDER — CELECOXIB 200 MG/1
200 CAPSULE ORAL ONCE
Status: COMPLETED | OUTPATIENT
Start: 2019-08-20 | End: 2019-08-20

## 2019-08-20 RX ORDER — SODIUM CHLORIDE 0.9 % (FLUSH) 0.9 %
10 SYRINGE (ML) INJECTION EVERY 12 HOURS SCHEDULED
Status: DISCONTINUED | OUTPATIENT
Start: 2019-08-20 | End: 2019-08-21 | Stop reason: HOSPADM

## 2019-08-20 RX ORDER — NICOTINE POLACRILEX 4 MG
15 LOZENGE BUCCAL PRN
Status: DISCONTINUED | OUTPATIENT
Start: 2019-08-20 | End: 2019-08-21

## 2019-08-20 RX ORDER — OXYCODONE HYDROCHLORIDE 5 MG/1
5-10 TABLET ORAL EVERY 4 HOURS PRN
Qty: 50 TABLET | Refills: 0 | Status: SHIPPED | OUTPATIENT
Start: 2019-08-20 | End: 2019-08-27

## 2019-08-20 RX ORDER — PROPOFOL 10 MG/ML
INJECTION, EMULSION INTRAVENOUS PRN
Status: DISCONTINUED | OUTPATIENT
Start: 2019-08-20 | End: 2019-08-20 | Stop reason: SDUPTHER

## 2019-08-20 RX ORDER — LIDOCAINE HYDROCHLORIDE 10 MG/ML
1 INJECTION, SOLUTION EPIDURAL; INFILTRATION; INTRACAUDAL; PERINEURAL
Status: DISCONTINUED | OUTPATIENT
Start: 2019-08-20 | End: 2019-08-20 | Stop reason: HOSPADM

## 2019-08-20 RX ORDER — CELECOXIB 200 MG/1
200 CAPSULE ORAL EVERY 24 HOURS
Status: DISCONTINUED | OUTPATIENT
Start: 2019-08-21 | End: 2019-08-21 | Stop reason: HOSPADM

## 2019-08-20 RX ORDER — DEXTROSE MONOHYDRATE 50 MG/ML
100 INJECTION, SOLUTION INTRAVENOUS PRN
Status: DISCONTINUED | OUTPATIENT
Start: 2019-08-20 | End: 2019-08-21

## 2019-08-20 RX ORDER — ONDANSETRON 2 MG/ML
4 INJECTION INTRAMUSCULAR; INTRAVENOUS
Status: DISCONTINUED | OUTPATIENT
Start: 2019-08-20 | End: 2019-08-20 | Stop reason: HOSPADM

## 2019-08-20 RX ORDER — OXYCODONE HYDROCHLORIDE AND ACETAMINOPHEN 5; 325 MG/1; MG/1
2 TABLET ORAL PRN
Status: DISCONTINUED | OUTPATIENT
Start: 2019-08-20 | End: 2019-08-20 | Stop reason: HOSPADM

## 2019-08-20 RX ORDER — SODIUM CHLORIDE 0.9 % (FLUSH) 0.9 %
10 SYRINGE (ML) INJECTION PRN
Status: DISCONTINUED | OUTPATIENT
Start: 2019-08-20 | End: 2019-08-20 | Stop reason: HOSPADM

## 2019-08-20 RX ORDER — MEPERIDINE HYDROCHLORIDE 25 MG/ML
12.5 INJECTION INTRAMUSCULAR; INTRAVENOUS; SUBCUTANEOUS EVERY 5 MIN PRN
Status: DISCONTINUED | OUTPATIENT
Start: 2019-08-20 | End: 2019-08-20 | Stop reason: HOSPADM

## 2019-08-20 RX ORDER — OXYCODONE HYDROCHLORIDE 5 MG/1
5 TABLET ORAL EVERY 4 HOURS PRN
Status: DISCONTINUED | OUTPATIENT
Start: 2019-08-20 | End: 2019-08-21 | Stop reason: HOSPADM

## 2019-08-20 RX ADMIN — Medication 3 G: at 14:34

## 2019-08-20 RX ADMIN — PROPOFOL 200 MG: 10 INJECTION, EMULSION INTRAVENOUS at 14:18

## 2019-08-20 RX ADMIN — SODIUM CHLORIDE: 9 INJECTION, SOLUTION INTRAVENOUS at 15:03

## 2019-08-20 RX ADMIN — HYDROMORPHONE HYDROCHLORIDE 0.5 MG: 1 INJECTION, SOLUTION INTRAMUSCULAR; INTRAVENOUS; SUBCUTANEOUS at 16:13

## 2019-08-20 RX ADMIN — ACETAMINOPHEN 650 MG: 325 TABLET ORAL at 19:08

## 2019-08-20 RX ADMIN — MEPERIDINE HYDROCHLORIDE 12.5 MG: 25 INJECTION INTRAMUSCULAR; INTRAVENOUS; SUBCUTANEOUS at 16:27

## 2019-08-20 RX ADMIN — HYDROMORPHONE HYDROCHLORIDE 0.5 MG: 1 INJECTION, SOLUTION INTRAMUSCULAR; INTRAVENOUS; SUBCUTANEOUS at 16:45

## 2019-08-20 RX ADMIN — Medication 10 MG: at 15:20

## 2019-08-20 RX ADMIN — SODIUM CHLORIDE: 4.5 INJECTION, SOLUTION INTRAVENOUS at 19:09

## 2019-08-20 RX ADMIN — DEXAMETHASONE SODIUM PHOSPHATE 8 MG: 4 INJECTION, SOLUTION INTRAMUSCULAR; INTRAVENOUS at 14:41

## 2019-08-20 RX ADMIN — LORAZEPAM 0.5 MG: 2 INJECTION INTRAMUSCULAR; INTRAVENOUS at 17:02

## 2019-08-20 RX ADMIN — CELECOXIB 200 MG: 200 CAPSULE ORAL at 13:43

## 2019-08-20 RX ADMIN — FENTANYL CITRATE 100 MCG: 50 INJECTION INTRAMUSCULAR; INTRAVENOUS at 14:18

## 2019-08-20 RX ADMIN — SODIUM CHLORIDE: 9 INJECTION, SOLUTION INTRAVENOUS at 13:38

## 2019-08-20 RX ADMIN — HYDROMORPHONE HYDROCHLORIDE 0.5 MG: 1 INJECTION, SOLUTION INTRAMUSCULAR; INTRAVENOUS; SUBCUTANEOUS at 16:19

## 2019-08-20 RX ADMIN — MIDAZOLAM 2 MG: 1 INJECTION INTRAMUSCULAR; INTRAVENOUS at 14:14

## 2019-08-20 RX ADMIN — ROCURONIUM BROMIDE 10 MG: 10 INJECTION INTRAVENOUS at 14:39

## 2019-08-20 RX ADMIN — OXYCODONE HYDROCHLORIDE 10 MG: 5 TABLET ORAL at 13:42

## 2019-08-20 RX ADMIN — LORAZEPAM 0.5 MG: 2 INJECTION INTRAMUSCULAR; INTRAVENOUS at 16:49

## 2019-08-20 RX ADMIN — MORPHINE SULFATE 2 MG: 2 INJECTION, SOLUTION INTRAMUSCULAR; INTRAVENOUS at 17:04

## 2019-08-20 RX ADMIN — CEFAZOLIN 3 G: 10 INJECTION, POWDER, FOR SOLUTION INTRAVENOUS; PARENTERAL at 22:18

## 2019-08-20 RX ADMIN — Medication 100 MG: at 14:18

## 2019-08-20 RX ADMIN — OXYCODONE HYDROCHLORIDE 10 MG: 10 TABLET ORAL at 19:08

## 2019-08-20 RX ADMIN — SODIUM CHLORIDE: 9 INJECTION, SOLUTION INTRAVENOUS at 13:41

## 2019-08-20 RX ADMIN — FENTANYL CITRATE 100 MCG: 50 INJECTION INTRAMUSCULAR; INTRAVENOUS at 16:05

## 2019-08-20 RX ADMIN — HYDROMORPHONE HYDROCHLORIDE 0.5 MG: 1 INJECTION, SOLUTION INTRAMUSCULAR; INTRAVENOUS; SUBCUTANEOUS at 16:04

## 2019-08-20 RX ADMIN — MORPHINE SULFATE 2 MG: 2 INJECTION, SOLUTION INTRAMUSCULAR; INTRAVENOUS at 20:47

## 2019-08-20 RX ADMIN — DOCUSATE SODIUM 100 MG: 100 CAPSULE, LIQUID FILLED ORAL at 20:37

## 2019-08-20 RX ADMIN — ROCURONIUM BROMIDE 30 MG: 10 INJECTION INTRAVENOUS at 14:22

## 2019-08-20 RX ADMIN — ROCURONIUM BROMIDE 5 MG: 10 INJECTION INTRAVENOUS at 15:13

## 2019-08-20 ASSESSMENT — PAIN SCALES - GENERAL
PAINLEVEL_OUTOF10: 7
PAINLEVEL_OUTOF10: 9
PAINLEVEL_OUTOF10: 10
PAINLEVEL_OUTOF10: 7
PAINLEVEL_OUTOF10: 10
PAINLEVEL_OUTOF10: 10
PAINLEVEL_OUTOF10: 6
PAINLEVEL_OUTOF10: 10
PAINLEVEL_OUTOF10: 9
PAINLEVEL_OUTOF10: 2
PAINLEVEL_OUTOF10: 7
PAINLEVEL_OUTOF10: 7

## 2019-08-20 ASSESSMENT — PAIN DESCRIPTION - DESCRIPTORS
DESCRIPTORS: ACHING
DESCRIPTORS: ACHING;DISCOMFORT
DESCRIPTORS: CONSTANT;SORE
DESCRIPTORS: ACHING
DESCRIPTORS: ACHING;DISCOMFORT;DULL
DESCRIPTORS: ACHING
DESCRIPTORS: SHOOTING;SHARP
DESCRIPTORS: SHARP;SHOOTING
DESCRIPTORS: CONSTANT;SORE
DESCRIPTORS: SHARP;SHOOTING

## 2019-08-20 ASSESSMENT — PAIN - FUNCTIONAL ASSESSMENT
PAIN_FUNCTIONAL_ASSESSMENT: PREVENTS OR INTERFERES SOME ACTIVE ACTIVITIES AND ADLS
PAIN_FUNCTIONAL_ASSESSMENT: 0-10
PAIN_FUNCTIONAL_ASSESSMENT: PREVENTS OR INTERFERES SOME ACTIVE ACTIVITIES AND ADLS
PAIN_FUNCTIONAL_ASSESSMENT: ACTIVITIES ARE NOT PREVENTED
PAIN_FUNCTIONAL_ASSESSMENT: PREVENTS OR INTERFERES SOME ACTIVE ACTIVITIES AND ADLS

## 2019-08-20 ASSESSMENT — PULMONARY FUNCTION TESTS
PIF_VALUE: 1
PIF_VALUE: 14
PIF_VALUE: 15
PIF_VALUE: 15
PIF_VALUE: 19
PIF_VALUE: 15
PIF_VALUE: 8
PIF_VALUE: 0
PIF_VALUE: 1
PIF_VALUE: 0
PIF_VALUE: 4
PIF_VALUE: 0
PIF_VALUE: 15
PIF_VALUE: 18
PIF_VALUE: 25
PIF_VALUE: 17
PIF_VALUE: 10
PIF_VALUE: 18
PIF_VALUE: 1
PIF_VALUE: 1
PIF_VALUE: 15
PIF_VALUE: 0
PIF_VALUE: 1
PIF_VALUE: 15
PIF_VALUE: 4
PIF_VALUE: 15
PIF_VALUE: 10
PIF_VALUE: 15
PIF_VALUE: 14
PIF_VALUE: 15
PIF_VALUE: 18
PIF_VALUE: 17
PIF_VALUE: 15
PIF_VALUE: 15
PIF_VALUE: 16
PIF_VALUE: 15
PIF_VALUE: 14
PIF_VALUE: 14
PIF_VALUE: 9
PIF_VALUE: 21
PIF_VALUE: 14
PIF_VALUE: 5
PIF_VALUE: 18
PIF_VALUE: 17
PIF_VALUE: 15
PIF_VALUE: 14
PIF_VALUE: 15
PIF_VALUE: 19
PIF_VALUE: 19
PIF_VALUE: 15
PIF_VALUE: 16
PIF_VALUE: 18
PIF_VALUE: 15
PIF_VALUE: 15
PIF_VALUE: 14
PIF_VALUE: 15
PIF_VALUE: 15
PIF_VALUE: 0
PIF_VALUE: 0
PIF_VALUE: 15
PIF_VALUE: 15
PIF_VALUE: 2
PIF_VALUE: 5
PIF_VALUE: 19
PIF_VALUE: 0
PIF_VALUE: 9
PIF_VALUE: 0
PIF_VALUE: 14
PIF_VALUE: 18
PIF_VALUE: 18
PIF_VALUE: 15
PIF_VALUE: 1
PIF_VALUE: 11
PIF_VALUE: 18
PIF_VALUE: 17
PIF_VALUE: 15
PIF_VALUE: 14
PIF_VALUE: 10
PIF_VALUE: 16
PIF_VALUE: 0
PIF_VALUE: 1
PIF_VALUE: 15
PIF_VALUE: 19
PIF_VALUE: 0
PIF_VALUE: 16
PIF_VALUE: 15
PIF_VALUE: 16
PIF_VALUE: 8
PIF_VALUE: 16
PIF_VALUE: 14
PIF_VALUE: 14
PIF_VALUE: 19
PIF_VALUE: 18
PIF_VALUE: 15
PIF_VALUE: 16
PIF_VALUE: 15
PIF_VALUE: 15
PIF_VALUE: 1
PIF_VALUE: 15
PIF_VALUE: 1
PIF_VALUE: 19
PIF_VALUE: 35

## 2019-08-20 ASSESSMENT — PAIN DESCRIPTION - PROGRESSION
CLINICAL_PROGRESSION: GRADUALLY IMPROVING
CLINICAL_PROGRESSION: GRADUALLY IMPROVING
CLINICAL_PROGRESSION: NOT CHANGED
CLINICAL_PROGRESSION: NOT CHANGED
CLINICAL_PROGRESSION: GRADUALLY IMPROVING
CLINICAL_PROGRESSION: GRADUALLY WORSENING
CLINICAL_PROGRESSION: NOT CHANGED

## 2019-08-20 ASSESSMENT — PAIN DESCRIPTION - LOCATION
LOCATION: HIP;INCISION
LOCATION: HIP

## 2019-08-20 ASSESSMENT — PAIN DESCRIPTION - FREQUENCY
FREQUENCY: CONTINUOUS
FREQUENCY: CONTINUOUS
FREQUENCY: INTERMITTENT
FREQUENCY: CONTINUOUS

## 2019-08-20 ASSESSMENT — PAIN DESCRIPTION - ORIENTATION
ORIENTATION: RIGHT

## 2019-08-20 ASSESSMENT — PAIN DESCRIPTION - ONSET
ONSET: ON-GOING
ONSET: GRADUAL
ONSET: ON-GOING

## 2019-08-20 ASSESSMENT — PAIN DESCRIPTION - PAIN TYPE
TYPE: SURGICAL PAIN
TYPE: ACUTE PAIN;SURGICAL PAIN
TYPE: ACUTE PAIN
TYPE: ACUTE PAIN;SURGICAL PAIN
TYPE: SURGICAL PAIN
TYPE: ACUTE PAIN;SURGICAL PAIN
TYPE: ACUTE PAIN

## 2019-08-20 ASSESSMENT — ENCOUNTER SYMPTOMS: SHORTNESS OF BREATH: 0

## 2019-08-20 NOTE — ANESTHESIA PRE PROCEDURE
Department of Anesthesiology  Preprocedure Note       Name:  Michael Little   Age:  69 y.o.  :  1945                                          MRN:  [id number]         Date:  2019      Surgeon: Jose M Marc):  Vazquez Rico MD    Procedure: RIGHT LATERAL TOTAL HIP REPLACEMENT (Right )    Medications prior to admission:   Prior to Admission medications    Medication Sig Start Date End Date Taking? Authorizing Provider   vitamin D (CHOLECALCIFEROL) 1000 UNIT TABS tablet Take 1,000 Units by mouth   Yes Historical Provider, MD   diclofenac (VOLTAREN) 75 MG EC tablet TAKE 1 TABLET BY MOUTH 2 TIMES DAILY AS NEEDED FOR JOINT PAINS 7/10/19  Yes Mavis Bradshaw MD   tamsulosin (FLOMAX) 0.4 MG capsule TAKE 1 CAPSULE BY MOUTH DAILY 19  Yes Bobby Hammond MD   triamterene-hydrochlorothiazide (OHCZHRF-28) 37.5-25 MG per tablet TAKE 1 TABLET BY MOUTH EVERY DAY 19  Yes Bobby Hammond MD   hydroxyurea (HYDREA) 500 MG chemo capsule TAKE TWO CAPSULES BY MOUTH DAILY 17  Yes Kumar Rucker MD   BABY ASPIRIN PO Take by mouth   Yes Historical Provider, MD   fish oil-omega-3 fatty acids 1000 MG capsule Take 2 g by mouth daily. Yes Historical Provider, MD   IRON PO Take 65 mg by mouth daily.  09  Yes Historical Provider, MD       Current medications:    Current Facility-Administered Medications   Medication Dose Route Frequency Provider Last Rate Last Dose    0.9 % sodium chloride infusion   Intravenous Continuous Zarina Vargas MD        sodium chloride flush 0.9 % injection 10 mL  10 mL Intravenous 2 times per day Zarina Vargas MD        sodium chloride flush 0.9 % injection 10 mL  10 mL Intravenous PRN Zarina Vargas MD        lidocaine PF 1 % injection 1 mL  1 mL Intradermal Once PRN Zarina Vargas MD        celecoxib (CELEBREX) capsule 200 mg  200 mg Oral Once Vazquez Rico MD        oxyCODONE (ROXICODONE) immediate release tablet 10 mg  10 mg

## 2019-08-20 NOTE — DISCHARGE INSTR - COC
doctor.  Resume previous medications as instructed by your doctor. You will be on Aspirin or Eliquis  for 30 days only. Stay off other anti-inflammatory medications (except Celebrex) while on blood thinners  Call Your Doctor If:  Irina Casillas You have increased pain not controlled by medications.  Excessive swelling in your ankle.  You develop numbness, tingling, or decreased movement.  You have a fever greater than 100 degrees for a day or over 101 degrees at any one time.  Your wound becomes more reddened, starts draining, or opens.  If you fall. You have any questions about your recovery. ? Inform your family doctor/dentist or any other doctor who cares for you in the future that you have a joint replacement. You may need antibiotics for dental or surgical procedures if there is any evidence of infection present. ?  If you have required the use of insulin to control your blood sugar after surgery, follow up with your family doctor. ? Call your surgeons office to schedule your appointment to be seen after surgery. ? Make your appointment to continue physical therapy per doctors orders. ? Smoking cessation assistance can be obtained from your family doctor or by [address] @ [phone]    _______________________________   _____   _______________________  ____                Patient Signature              Date      Witness                               Date          Anterior Approach  The main positions and movements to avoid after an anterior approach include bending the hip back, turning your hip and leg out, or spreading your leg outward. Don't stretch your hip back. Walk with short steps. Taking a longer step when leading with your nonoperated hip stretches the surgical hip back. Don't kneel only on one knee. Kneeling only on the surgical hip stretches the hip back. Use both knees when you must kneel down. Don't turn your foot out.  Place a pillow next to your hip and leg to keep

## 2019-08-21 ENCOUNTER — TELEPHONE (OUTPATIENT)
Dept: INTERNAL MEDICINE CLINIC | Age: 74
End: 2019-08-21

## 2019-08-21 VITALS
BODY MASS INDEX: 35.89 KG/M2 | RESPIRATION RATE: 16 BRPM | DIASTOLIC BLOOD PRESSURE: 85 MMHG | TEMPERATURE: 98.1 F | OXYGEN SATURATION: 97 % | HEIGHT: 72 IN | HEART RATE: 88 BPM | SYSTOLIC BLOOD PRESSURE: 144 MMHG | WEIGHT: 265 LBS

## 2019-08-21 LAB
ESTIMATED AVERAGE GLUCOSE: 71 MG/DL
GLUCOSE BLD-MCNC: 140 MG/DL (ref 70–99)
HBA1C MFR BLD: 4.1 %
HCT VFR BLD CALC: 33.3 % (ref 40.5–52.5)
HEMOGLOBIN: 11.9 G/DL (ref 13.5–17.5)
PERFORMED ON: ABNORMAL

## 2019-08-21 PROCEDURE — 97166 OT EVAL MOD COMPLEX 45 MIN: CPT

## 2019-08-21 PROCEDURE — 97116 GAIT TRAINING THERAPY: CPT

## 2019-08-21 PROCEDURE — 97162 PT EVAL MOD COMPLEX 30 MIN: CPT

## 2019-08-21 PROCEDURE — 97535 SELF CARE MNGMENT TRAINING: CPT

## 2019-08-21 PROCEDURE — 2580000003 HC RX 258: Performed by: ORTHOPAEDIC SURGERY

## 2019-08-21 PROCEDURE — 6370000000 HC RX 637 (ALT 250 FOR IP): Performed by: NURSE PRACTITIONER

## 2019-08-21 PROCEDURE — 6360000002 HC RX W HCPCS: Performed by: ORTHOPAEDIC SURGERY

## 2019-08-21 PROCEDURE — 6370000000 HC RX 637 (ALT 250 FOR IP): Performed by: ORTHOPAEDIC SURGERY

## 2019-08-21 PROCEDURE — 99232 SBSQ HOSP IP/OBS MODERATE 35: CPT | Performed by: INTERNAL MEDICINE

## 2019-08-21 PROCEDURE — 36415 COLL VENOUS BLD VENIPUNCTURE: CPT

## 2019-08-21 PROCEDURE — 85014 HEMATOCRIT: CPT

## 2019-08-21 PROCEDURE — 97530 THERAPEUTIC ACTIVITIES: CPT

## 2019-08-21 PROCEDURE — 97110 THERAPEUTIC EXERCISES: CPT

## 2019-08-21 PROCEDURE — 85018 HEMOGLOBIN: CPT

## 2019-08-21 PROCEDURE — 94760 N-INVAS EAR/PLS OXIMETRY 1: CPT

## 2019-08-21 PROCEDURE — 83036 HEMOGLOBIN GLYCOSYLATED A1C: CPT

## 2019-08-21 PROCEDURE — 0SR90JA REPLACEMENT OF RIGHT HIP JOINT WITH SYNTHETIC SUBSTITUTE, UNCEMENTED, OPEN APPROACH: ICD-10-PCS | Performed by: ORTHOPAEDIC SURGERY

## 2019-08-21 RX ADMIN — TRIAMTERENE AND HYDROCHLOROTHIAZIDE 1 TABLET: 37.5; 25 TABLET ORAL at 08:46

## 2019-08-21 RX ADMIN — DOCUSATE SODIUM 100 MG: 100 CAPSULE, LIQUID FILLED ORAL at 08:46

## 2019-08-21 RX ADMIN — ENOXAPARIN SODIUM 30 MG: 30 INJECTION SUBCUTANEOUS at 08:46

## 2019-08-21 RX ADMIN — ACETAMINOPHEN 650 MG: 325 TABLET ORAL at 12:55

## 2019-08-21 RX ADMIN — TAMSULOSIN HYDROCHLORIDE 0.4 MG: 0.4 CAPSULE ORAL at 08:46

## 2019-08-21 RX ADMIN — HYDROXYUREA 500 MG: 500 CAPSULE ORAL at 08:46

## 2019-08-21 RX ADMIN — ACETAMINOPHEN 650 MG: 325 TABLET ORAL at 06:01

## 2019-08-21 RX ADMIN — CEFAZOLIN 3 G: 10 INJECTION, POWDER, FOR SOLUTION INTRAVENOUS; PARENTERAL at 05:41

## 2019-08-21 RX ADMIN — CELECOXIB 200 MG: 200 CAPSULE ORAL at 06:00

## 2019-08-21 RX ADMIN — OXYCODONE HYDROCHLORIDE 10 MG: 10 TABLET ORAL at 12:55

## 2019-08-21 RX ADMIN — OXYCODONE HYDROCHLORIDE 10 MG: 10 TABLET ORAL at 08:45

## 2019-08-21 RX ADMIN — ACETAMINOPHEN 650 MG: 325 TABLET ORAL at 00:33

## 2019-08-21 RX ADMIN — FERROUS SULFATE TAB EC 324 MG (65 MG FE EQUIVALENT) 324 MG: 324 (65 FE) TABLET DELAYED RESPONSE at 08:46

## 2019-08-21 ASSESSMENT — PAIN DESCRIPTION - DESCRIPTORS
DESCRIPTORS: ACHING

## 2019-08-21 ASSESSMENT — PAIN DESCRIPTION - ORIENTATION
ORIENTATION: RIGHT

## 2019-08-21 ASSESSMENT — PAIN DESCRIPTION - PAIN TYPE
TYPE: ACUTE PAIN
TYPE: ACUTE PAIN
TYPE: SURGICAL PAIN
TYPE: SURGICAL PAIN
TYPE: ACUTE PAIN
TYPE: ACUTE PAIN

## 2019-08-21 ASSESSMENT — ENCOUNTER SYMPTOMS
GASTROINTESTINAL NEGATIVE: 1
COUGH: 0
CHEST TIGHTNESS: 0
SHORTNESS OF BREATH: 0
TROUBLE SWALLOWING: 0
WHEEZING: 0

## 2019-08-21 ASSESSMENT — PAIN DESCRIPTION - PROGRESSION
CLINICAL_PROGRESSION: GRADUALLY WORSENING
CLINICAL_PROGRESSION: GRADUALLY IMPROVING
CLINICAL_PROGRESSION: GRADUALLY WORSENING
CLINICAL_PROGRESSION: GRADUALLY IMPROVING
CLINICAL_PROGRESSION: GRADUALLY WORSENING
CLINICAL_PROGRESSION: GRADUALLY IMPROVING
CLINICAL_PROGRESSION: GRADUALLY WORSENING

## 2019-08-21 ASSESSMENT — PAIN DESCRIPTION - LOCATION
LOCATION: HIP

## 2019-08-21 ASSESSMENT — PAIN DESCRIPTION - ONSET
ONSET: ON-GOING

## 2019-08-21 ASSESSMENT — PAIN SCALES - GENERAL
PAINLEVEL_OUTOF10: 8
PAINLEVEL_OUTOF10: 2
PAINLEVEL_OUTOF10: 5
PAINLEVEL_OUTOF10: 8
PAINLEVEL_OUTOF10: 2
PAINLEVEL_OUTOF10: 5
PAINLEVEL_OUTOF10: 6

## 2019-08-21 ASSESSMENT — PAIN DESCRIPTION - FREQUENCY
FREQUENCY: CONTINUOUS

## 2019-08-21 NOTE — PROGRESS NOTES
of getting the left foot flat before proceeding with right   - good heel contact on right and fair weight bearing right LE  Distance: in room for ~20 feet   Comments: overall stable  Stairs/Curb  Stairs?: No     Balance  Comments: able to sit midline at the EOB and in recliner    - midline in static stance on the rolling walker   -dynamic is fair on rolling walker in this first and limited observation  Exercises  Gluteal Sets: 10 per hour   Ankle Pumps: 30 per hour in easy pace (able to manage partial ankle pumps on left )  Comments: encouraged practice with the spirometer as instructed by RT     Plan   Plan  Times per week: 1-4 sessions  Current Treatment Recommendations: Strengthening, Functional Mobility Training, Transfer Training, Gait Training, Modalities, Positioning, Patient/Caregiver Education & Training, ADL/Self-care Training  Safety Devices  Type of devices:  All fall risk precautions in place, Call light within reach, Left in chair, Nurse notified(Brigitte)    AM-PAC Score  AM-PAC Inpatient Mobility Raw Score : 12 (08/21/19 0857)  AM-PAC Inpatient T-Scale Score : 35.33 (08/21/19 0857)  Mobility Inpatient CMS 0-100% Score: 68.66 (08/21/19 0857)  Mobility Inpatient CMS G-Code Modifier : CL (08/21/19 0857)    Goals  Short term goals  Time Frame for Short term goals: 1-2 days   Short term goal 1: bed mobility at min assist   Short term goal 2: transfers at [facility] term goal 3: ambulation at [address] rolling walker for 30-40 feet   Short term goal 4: exercises at supervision  Short term goal 5: verbalize and follow hip precautions - anteriolateral   Patient Goals   Patient goals : pain relief and good use of his new hip       Therapy Time: ~40 minutes    Diaz Espino, PT

## 2019-08-21 NOTE — PROGRESS NOTES
[facility] Orthopedic Surgery   Progress Note      S/P :  SUBJECTIVE  In chair Alert and oriented. . Pain is   described in right hip and with the intensity of moderate. Pain is described as aching. OBJECTIVE              Physical                      VITALS:  /72   Pulse 86   Temp 99.1 °F (37.3 °C) (Oral)   Resp 16   Ht 6' (1.829 m)   Wt 265 lb (120.2 kg)   SpO2 98%   BMI 35.94 kg/m²                     MUSCULOSKELETAL:  right foot NVI. Wiggles toes to command. Pedal pulses are palpable. NEUROLOGIC:                                  Sensory:  Touch:  Right Lower Extremity:  normal                                                 Surgical wound appears clean and dry right hip with Prineo dressing Ice applied. CARLY hose on.      Data       CBC:   Lab Results   Component Value Date    WBC 5.2 08/06/2019    RBC 3.61 08/06/2019    RBC 3.89 06/16/2017    HGB 11.9 08/21/2019    HCT 33.3 08/21/2019    .7 08/06/2019    MCH 36.4 08/06/2019    MCHC 35.1 08/06/2019    RDW 15.6 08/06/2019     08/06/2019    MPV 6.7 08/06/2019        WBC:    Lab Results   Component Value Date    WBC 5.2 08/06/2019        Hemoglobin/Hematocrit:    Lab Results   Component Value Date    HGB 11.9 08/21/2019    HCT 33.3 08/21/2019        PT/INR:    Lab Results   Component Value Date    PROTIME 12.5 08/06/2019    INR 1.10 08/06/2019              Current Inpatient Medications             Current Facility-Administered Medications: celecoxib (CELEBREX) capsule 200 mg, 200 mg, Oral, Q24H  hydroxyurea (HYDREA) chemo capsule 500 mg, 500 mg, Oral, Daily  ferrous sulfate EC tablet 324 mg, 324 mg, Oral, Daily  tamsulosin (FLOMAX) capsule 0.4 mg, 0.4 mg, Oral, Daily  triamterene-hydrochlorothiazide (MAXZIDE-25) 37.5-25 MG per tablet 1 tablet, 1 tablet, Oral, Daily  0.45 % sodium chloride infusion, , Intravenous, Continuous  sodium chloride flush 0.9 % injection 10 mL, 10 mL, Intravenous, 2 times per day  sodium chloride

## 2019-08-21 NOTE — PLAN OF CARE
Control of acute pain  Description  Control of acute pain  8/21/2019 1005 by Samra Fraire RN  Outcome: Ongoing  Note:   Pt c/o R hip pain rated 8/10. PRN Oxycodone administered with moderate results. 8/20/2019 2113 by Kinza Tang RN  Outcome: Ongoing  Goal: Control of chronic pain  Description  Control of chronic pain  8/21/2019 1005 by Samra Fraire RN  Outcome: Ongoing  Note:   Pt has had no c/o chronic pain issues this shift. 8/20/2019 2113 by Kinza Tang RN  Outcome: Ongoing     Problem: Falls - Risk of:  Goal: Will remain free from falls  Description  Will remain free from falls  8/21/2019 1005 by Samra Fraire RN  Outcome: Ongoing  Note:   Pt free from injury or falls at this time, fall precautions in place, bed in low position, side rail up x2, Mack Fall Risk: High (45 and higher), bed alarm on, reoriented to room and call light, reminded not to get up without assistance, call light in reach, will continue to monitor. Pt verbalizes understanding of fall risk procedures. 8/20/2019 2113 by Kinza Tang RN  Outcome: Ongoing  Goal: Absence of physical injury  Description  Absence of physical injury  8/21/2019 1005 by Samra Fraire RN  Outcome: Ongoing  Note:   Pt has not incurred any type of physical injury this shift.   8/20/2019 2113 by Kinza Tang RN  Outcome: Ongoing

## 2019-08-21 NOTE — CARE COORDINATION
Pt D/C'd to [facility] today. [facility] to transport at 12:30pm.  HENS completed. Nurse notified.   Tres Cowan [phone]  Electronically signed by Jelena Acuña on 8/21/2019 at 11:09 AM      [facility] Approved - [id number]

## 2019-08-21 NOTE — OP NOTE
[address] Jake Flores                                 OPERATIVE REPORT    PATIENT NAME: Karri Woods                       :        1945  MED REC NO:   [id number]                          ROOM:       5893  ACCOUNT NO:   [de-identified]                           ADMIT DATE: 2019  PROVIDER:     Matilda Yap MD      DATE OF PROCEDURE:  2019    PREOPERATIVE DIAGNOSIS:  Degenerative osteoarthritis of the right hip. POSTOPERATIVE DIAGNOSIS:  Degenerative osteoarthritis of the right hip. OPERATION PERFORMED:  right total hip arthroplasty. SURGEON:  Matilda Yap MD    ASSISTANT:  AMY Ken    INDICATIONS:  The patient is a 68-year-old male with class II morbid  obesity with advanced degenerative osteoarthritis of the right hip. He  comes for total hip arthroplasty. He is status post contralateral left  hip performed four years ago. OPERATIVE PROCEDURE:  The patient was brought to the operating room. Once anesthetic was obtained and intravenous antibiotics delivered, he  was placed in the lateral decubitus position. His hip and leg were  prepped and draped in a sterile fashion. A long lateral incision was  made. The skin and subcutaneous tissue were divided down to the  iliotibial band. This was split along the lines of the incision to  expose the greater trochanter. Direct lateral approach was performed. The insertion of the gluteus medius and minimus was reflected medially  to expose the femoral neck, eventually the capsule which was excised,  and the femoral head. The femoral head with great difficulty was  dislocated due to its protrusio-type orientation. Once it was  dislocated, proximal femoral osteotomy was performed. The acetabulum  was exposed. The  remaining labrum was removed. The acetabulum then  was reamed to accept a 56-mm uncemented cup.   This was hammered

## 2019-08-21 NOTE — PROGRESS NOTES
Huddle performed this morning including Nurse navigator Ruben, Physical therapist oralia, and Occupational therapist Hector Chen. Discussed plan of care, discharge plan, and dme needs if applicable for orthopedic total joint patient.   Electronically signed by June Ordoñez RN on 8/21/2019 at 8:07 AM

## 2019-08-21 NOTE — PROGRESS NOTES
Data- discharge order received, patient verbalized agreement to discharge, disposition to [facility]. Action- discharge instructions prepared and given to patient , patient verbalized understanding. Medication information packet given r/t NEW and/or CHANGED prescriptions emphasizing name/purpose/side effects, pt verbalized understanding. Discharge instruction summary: Diet- general, Activity- FWBAT hip precautions discussed with discharge instructions, Primary Care Physician as follows: Carlito Ji -[phone] .  f/u appointment with orthopedic office listed below , immunizations reviewed and discussed with patient, prescription rx signed and in transfer packet. Inpatient surgical procedure precautions reviewed: . Neurovascular check performed and patient is WNLs, denies numbness/tingling in extremties. Incision site  prineo glue dressing assessed and is  clean,dry, and intact, no signs of redness, drainage, or odor noted. patient's bedside RN poncho notified of patient completing discharge instructions. Response- Pt belongings gathered. prescription rx signed and in transfer packet. Disposition is [address], transported by Rambo Stover and transfer packet provided to patient and instructed patient to hand to nurse at facility, no complications.      Future Appointments   Date Time Provider Gina Irizarry   9/5/2019 10:45 AM MD Jose A Iverson   9/25/2019 11:00 AM Zoya Banegas     Electronically signed by June Ordoñez RN on 8/21/2019 at 10:51 AM

## 2019-08-21 NOTE — PROGRESS NOTES
Clinical Pharmacy Note  Medication Counseling    Reviewed new medications started during hospital admission: asa, oxycodone, celebrex. Indications and side effects were emphasized during counseling. All medication-related questions addressed. Patient verbalized understanding of education. Should the patient express any additional questions or concerns regarding their medications, please do not hesitate to contact the pharmacy department. Patient/caregiver aware they may refuse medications during hospital stay. 10 minutes spent educating patient regarding medications.

## 2019-08-21 NOTE — PROGRESS NOTES
with wife, and completes functional mobility with quad cane. Currently, pt presents with ROM/strength in [facility] for self-care and transfers. Pt completed bed mobility with min A and sit <> stand transfers with min A and cueing for safe hand placement and precautions. Pt completed functional mobility with RW with min A, no overt LOB - mindful of L foot drop. Pt require min/mod A for LB ADLs - declined further bathing tasks. Discussed hip precautions with patient in terms of dressing tasks. Pt would benefit from continued skilled therapy prior to return home to increase mobility, safety, and independence. Treatment Diagnosis: impaired func mob, transfers, and ADL tasks   Prognosis: Good  Decision Making: Medium Complexity  History:  PMH: Essential thrombocythemia, Amnesia, L ERYN   Exam: ADLs, transfers, func mob, bed mob  Assistance / Modification: min A for mobility, min/mod A for ADLs  OT Education: OT Role;Precautions;Plan of Care;ADL Adaptive Strategies;Transfer Training;Equipment  REQUIRES OT FOLLOW UP: Yes  Activity Tolerance  Activity Tolerance: Patient Tolerated treatment well  Safety Devices  Safety Devices in place: Yes(BETH Ochoa) notified)  Type of devices: Left in chair;Nurse notified; Chair alarm in place;Call light within reach           Patient Diagnosis(es): The encounter diagnosis was Arthritis of right hip.     has a past medical history of Amnesia, Arthritis, Erythema, Essential thrombocythemia ([facility] [address] HBP (high blood pressure), and Nodule. has a past surgical history that includes Carpal tunnel release; knee surgery (1969); knee surgery (Left); back surgery; Colonoscopy (5/09); Colonoscopy (1/15); bone marrow biopsy; joint replacement (Left); joint replacement (Right, 08/20/2019); and Total hip arthroplasty (Right, 8/20/2019).     Treatment Diagnosis: impaired func mob, transfers, and ADL tasks       Restrictions  Restrictions/Precautions  Restrictions/Precautions: Fall Risk  Position Activity Restriction  Other position/activity restrictions: Anterolateral hip precautions, WBAT    Subjective   General  Chart Reviewed: Yes  Patient assessed for rehabilitation services?: Yes  Additional Pertinent Hx: Pt is 69 y.o. M who presents with arthritis of R hip. Pt is s/p R ERYN and is WBAT with anterolateral hip precautions. PMH: Essential thrombocythemia, Amnesia, L ERYN   Family / Caregiver Present: No  Referring Practitioner: Navi Sin MD   Diagnosis: Arthritis of R hip  Subjective  Subjective: Pt met bedside, agreeable for OOB activity and therapy evaluation.    Pain Assessment  Pain Level: 8  Vital Signs  Resp: 16  Oxygen Therapy  SpO2: 98 %  Pulse Oximeter Device Mode: Intermittent  Pulse Oximeter Device Location: Finger     Social/Functional History  Social/Functional History  Lives With: Spouse  Type of Home: House  Home Layout: One level, Laundry in basement  Home Access: Stairs to enter with rails  Entrance Stairs - Number of Steps: 1 + 1   Entrance Stairs - Rails: Both  Bathroom Shower/Tub: Walk-in shower, Doors  Bathroom Toilet: Handicap height  Bathroom Equipment: Shower chair, Grab bars in shower, Toilet raiser  Bathroom Accessibility: Walker accessible  Home Equipment: Rolling walker, Wheelchair-manual, Reacher, Lift chair, Sock aid, Quad cane(adjustable bed)  ADL Assistance: [address] [place] [address] Avenue: (shares with his spouse)  Ambulation Assistance: Independent(quad cane)  Transfer Assistance: Independent  Additional Comments: plans discharge to SNU       Objective   Vision: Within Functional Limits  Hearing: Within functional limits      Orientation  Overall Orientation Status: Within Functional Limits     Balance  Sitting Balance: Stand by assistance  Standing Balance: Contact guard assistance  Standing Balance  Time: ~3 minutes  Activity: Func mob, transfers, and ADL tasks     Functional Mobility  Functional - Mobility Device: Rolling Walker  Activity: Other(Around bed to recliner chair )  Assist Level: Minimal assistance  Functional Mobility Comments: Pt completed functional mobility with RW with min A - L foot drop at baseline, pt is mindful. No overt LOB. ADL  Grooming: (Pt washed hands/face with wash cloth with setup; declined oral care)  UE Dressing: (Awaiting RN to disconnect IV line, provided setup for donning overhead shirt )  LE Dressing: (Assist to thread underwear and pants, pt assisted in managing clothing over hips but required assist for thoroughness in back )  Toileting: (Declined need)  Additional Comments: PTA pt reports independence in self-care tasks. Anticipate pt to require min/mod A for LB ADLs, SBA for UB ADLs, CGA for toileting, setup for grooming. Tone RUE  RUE Tone: Normotonic  Tone LUE  LUE Tone: Normotonic  Coordination  Movements Are Fluid And Coordinated: Yes     Bed mobility  Supine to Sit: Minimal assistance(HOB elevated, use of hand rail  Simultaneous filing. User may not have seen previous data.)  Sit to Supine: Unable to assess(Up in chair at end of session Simultaneous filing. User may not have seen previous data.)     Transfers  Sit to stand: Minimal assistance  Stand to sit: Minimal assistance  Transfer Comments: Min A for sit <> stand from EOB to RW and sit to recliner chair x2 repetitions. Pt requires increased time and cueing for safe hand placement and hip precautions.       Cognition  Overall Cognitive Status: WFL        Sensation  Overall Sensation Status: WFL        LUE AROM (degrees)  LUE AROM : WFL  Left Hand AROM (degrees)  Left Hand AROM: WFL  RUE AROM (degrees)  RUE AROM : WFL  Right Hand AROM (degrees)  Right Hand AROM: WFL     LUE Strength  Gross LUE Strength: WFL  RUE Strength  Gross RUE Strength: WFL             Plan   Plan  Times per week: 1 or 2 more sessions  Times per day: Daily  Current Treatment Recommendations: Functional Mobility Training, Strengthening, Endurance Training, Balance Training, Safety Education & Training, Equipment Evaluation, Education, & procurement, Patient/Caregiver Education & Training, Self-Care / ADL    AM-PAC Score    Jesus Nieves scored a 17/24 on the AM-PAC ADL Inpatient form. Current research shows that an AM-PAC score of 17 or less is typically not associated with a discharge to the patient's home setting. Based on the patients AM-PAC score and their current ADL deficits, it is recommended that the patient have 3-5 sessions per week of Occupational Therapy at d/c to increase the patients independence. AM-PAC Inpatient Daily Activity Raw Score: 17 (08/21/19 0842)  AM-PAC Inpatient ADL T-Scale Score : 37.26 (08/21/19 4127)  ADL Inpatient CMS 0-100% Score: 50.11 (08/21/19 1328)  ADL Inpatient CMS G-Code Modifier : CK (08/21/19 9984)    Goals  Short term goals  Time Frame for Short term goals: prior to d/c  Short term goal 1: Pt will complete functional mobility and transfers with CGA  Short term goal 2: Pt will complete bathing with min A  Short term goal 3: Pt will complete dressing with min A   Short term goal 4: Pt will complete toileting with CGA  Short term goal 5: Pt will complete grooming with setup   Patient Goals   Patient goals : \"I would like to get moving better and do therapy before I go home\"        Therapy Time   Individual Concurrent Group Co-treatment   Time In       0805   Time Out       0845   Minutes       40   Timed Code Treatment Minutes: 25 Minutes(15 min eval )     If pt is discharged prior to next OT session, this note will serve as the discharge summary.     Phoebe Hayes

## 2019-08-21 NOTE — PROGRESS NOTES
Pt sitting up in chair after working with PT/OT. He c/o R hip pain rated 8/10, but denied any nausea, numbness, or tingling. Pulses palpable, skin warm to touch. He has moderate flexion in BLE. Dressing to R hip removed, prineo intact with no drainage noted. Fall precautions in place, belongings within reach. Will continue to monitor and assess.

## 2019-08-21 NOTE — CARE COORDINATION
8/21 met with patient at bedside to confirm DC plan-- as per JET patient plans to go to [facility]. Darian Francisco aware from [facility]--will initiate precert . Patient prefers [address] for transport. Electronically signed by Wanda Ji on 8/21/2019 at 9:26 AM

## 2019-08-22 ENCOUNTER — TELEPHONE (OUTPATIENT)
Dept: ORTHOPEDICS UNIT | Age: 74
End: 2019-08-22

## 2019-08-22 NOTE — TELEPHONE ENCOUNTER
Attempted to contact patient. Left voicemail for patient stating purpose and call back number.    Sepideh Barbara  Orthopedic Nurse Navigator  Phone number: [phone]  Future Appointments   Date Time Provider Gina Irizarry   9/5/2019 10:45 AM MD Aristeo Altamirano   9/25/2019 11:00 AM Zoya Montanez       Electronically signed by Tamanna Jade RN on 8/22/2019 at 3:09 PM

## 2019-08-26 ENCOUNTER — TELEPHONE (OUTPATIENT)
Dept: ORTHOPEDICS UNIT | Age: 74
End: 2019-08-26

## 2019-09-04 ENCOUNTER — APPOINTMENT (OUTPATIENT)
Dept: GENERAL RADIOLOGY | Age: 74
End: 2019-09-04
Payer: MEDICARE

## 2019-09-04 ENCOUNTER — APPOINTMENT (OUTPATIENT)
Dept: CT IMAGING | Age: 74
End: 2019-09-04
Payer: MEDICARE

## 2019-09-04 ENCOUNTER — HOSPITAL ENCOUNTER (EMERGENCY)
Age: 74
Discharge: HOME | End: 2019-09-04
Attending: EMERGENCY MEDICINE
Payer: MEDICARE

## 2019-09-04 VITALS
HEART RATE: 82 BPM | OXYGEN SATURATION: 96 % | DIASTOLIC BLOOD PRESSURE: 88 MMHG | TEMPERATURE: 98 F | SYSTOLIC BLOOD PRESSURE: 147 MMHG | RESPIRATION RATE: 18 BRPM | WEIGHT: 284.17 LBS | BODY MASS INDEX: 38.54 KG/M2

## 2019-09-04 DIAGNOSIS — R10.31 RIGHT GROIN PAIN: Primary | ICD-10-CM

## 2019-09-04 DIAGNOSIS — Z96.641 S/P HIP REPLACEMENT, RIGHT: ICD-10-CM

## 2019-09-04 LAB
D DIMER: 650 NG/ML DDU (ref 0–229)
EKG ATRIAL RATE: 81 BPM
EKG DIAGNOSIS: NORMAL
EKG P AXIS: 87 DEGREES
EKG P-R INTERVAL: 156 MS
EKG Q-T INTERVAL: 392 MS
EKG QRS DURATION: 90 MS
EKG QTC CALCULATION (BAZETT): 455 MS
EKG R AXIS: 44 DEGREES
EKG T AXIS: 25 DEGREES
EKG VENTRICULAR RATE: 81 BPM
TROPONIN: <0.01 NG/ML

## 2019-09-04 PROCEDURE — 93005 ELECTROCARDIOGRAM TRACING: CPT | Performed by: INTERNAL MEDICINE

## 2019-09-04 PROCEDURE — 73502 X-RAY EXAM HIP UNI 2-3 VIEWS: CPT

## 2019-09-04 PROCEDURE — 6370000000 HC RX 637 (ALT 250 FOR IP): Performed by: EMERGENCY MEDICINE

## 2019-09-04 PROCEDURE — 93010 ELECTROCARDIOGRAM REPORT: CPT | Performed by: INTERNAL MEDICINE

## 2019-09-04 PROCEDURE — 99284 EMERGENCY DEPT VISIT MOD MDM: CPT

## 2019-09-04 PROCEDURE — 93971 EXTREMITY STUDY: CPT

## 2019-09-04 PROCEDURE — 85379 FIBRIN DEGRADATION QUANT: CPT

## 2019-09-04 PROCEDURE — 84484 ASSAY OF TROPONIN QUANT: CPT

## 2019-09-04 RX ORDER — OXYCODONE HYDROCHLORIDE AND ACETAMINOPHEN 5; 325 MG/1; MG/1
1 TABLET ORAL ONCE
Status: COMPLETED | OUTPATIENT
Start: 2019-09-04 | End: 2019-09-04

## 2019-09-04 RX ORDER — NAPROXEN 500 MG/1
500 TABLET ORAL 2 TIMES DAILY WITH MEALS
Qty: 20 TABLET | Refills: 0 | Status: SHIPPED | OUTPATIENT
Start: 2019-09-04 | End: 2020-03-11 | Stop reason: ALTCHOICE

## 2019-09-04 RX ORDER — ACETAMINOPHEN 500 MG
TABLET ORAL
Status: COMPLETED
Start: 2019-09-04 | End: 2019-09-04

## 2019-09-04 RX ADMIN — OXYCODONE HYDROCHLORIDE AND ACETAMINOPHEN 1 TABLET: 5; 325 TABLET ORAL at 03:07

## 2019-09-04 ASSESSMENT — PAIN SCALES - GENERAL
PAINLEVEL_OUTOF10: 2
PAINLEVEL_OUTOF10: 4
PAINLEVEL_OUTOF10: 8

## 2019-09-04 ASSESSMENT — PAIN DESCRIPTION - PAIN TYPE
TYPE: ACUTE PAIN
TYPE: ACUTE PAIN

## 2019-09-04 ASSESSMENT — PAIN DESCRIPTION - ORIENTATION
ORIENTATION: RIGHT
ORIENTATION: RIGHT

## 2019-09-04 ASSESSMENT — PAIN DESCRIPTION - FREQUENCY: FREQUENCY: CONTINUOUS

## 2019-09-04 ASSESSMENT — PAIN DESCRIPTION - LOCATION
LOCATION: GROIN
LOCATION: GROIN

## 2019-09-04 ASSESSMENT — PAIN DESCRIPTION - DESCRIPTORS
DESCRIPTORS: SHARP
DESCRIPTORS: SHARP

## 2019-09-04 ASSESSMENT — PAIN DESCRIPTION - PROGRESSION
CLINICAL_PROGRESSION: GRADUALLY IMPROVING
CLINICAL_PROGRESSION: RESOLVED

## 2019-09-04 NOTE — ED NOTES
md Armstrong Husbands at bedside  Requesting ekg states pt had complaint of chest pressure initially before the groin pain. Pt did not relay this to squad and was not relayed to nursing or md. On arrival pt only complaint was groin pain. After asking pt he agreed \"I had minor chest pressure but once squad arrived it was gone so I didn't relay to ems\" ekg completed and reviewed by md healy.       Herbie Leonardo RN  09/04/19 7456

## 2019-09-04 NOTE — ED PROVIDER NOTES
(NAPROXEN) 500 MG EC TABLET    Take 1 tablet by mouth 2 times daily (with meals)       (Please note that portions of this note were completed with a voice recognition program.  Efforts were made to edit the dictations but occasionally words are mis-transcribed.)    Fidelina Kumari, [id number] [place] [address]  09/04/19 [place] [address] [place] [address]  09/04/19 [facility]

## 2019-09-04 NOTE — ED NOTES
PT resting quietly in bed at this time. RR even, unlabored. No distress noted. Side rails up. Bed in lowest position. Will continue to monitor.       Charly Ceja RN  09/04/19 7115

## 2019-09-04 NOTE — ED PROVIDER NOTES
[facility]      Pt Name: Gaurang Marie  MRN: [id number]  [place] 1945  Date ofevaluation: 9/4/2019  Provider: Dianne Valdez MD    CHIEF COMPLAINT       Chief Complaint   Patient presents with    Groin Pain     Right groin pain         HISTORY OF PRESENT ILLNESS   (Location/Symptom, Timing/Onset,Context/Setting, Quality, Duration, Modifying Factors, Severity)  Note limiting factors. Gaurang Marie is a 69 y.o. male who presents to the emergency department for evaluation of right-sided groin pain. Patient states that he recently had hip arthroplasty had a hip replacement placed. Patient was discharged to nursing home. Patient was post to be discharged home from the nursing home today but began to develop right groin pain. Patient denies numbness or loss sensation. Patient denies injury or fall. The patient states that he noticed the pain while getting up after using the bathroom. Patient has noticed swelling in his right lower extremity. Patient denies a history of DVT or PE and occurs on anticoagulation. HPI    NursingNotes were reviewed. REVIEW OF SYSTEMS    (2-9 systems for level 4, 10 or more for level 5)     Review of Systems   Musculoskeletal: Positive for arthralgias and myalgias. All other systems reviewed and are negative. Except as noted above the remainder of the review of systems was reviewed and negative. PAST MEDICAL HISTORY     Past Medical History:   Diagnosis Date    Amnesia 01/78    Arthritis     Erythema     Antral    Essential thrombocythemia ([facility] [address]     HBP (high blood pressure)     Nodule     Stomach         SURGICALHISTORY       Past Surgical History:   Procedure Laterality Date    BACK SURGERY      BONE MARROW BIOPSY      CARPAL TUNNEL RELEASE      COLONOSCOPY  5/09    Polypectomy    COLONOSCOPY  1/15    normal - DR. Mckinney    JOINT REPLACEMENT Left     JOINT REPLACEMENT Right MD (electronically signed)  Attending Emergency Physician          Eda Sue MD  09/04/19 Emmanuel Kaur MD  09/05/19 7960

## 2019-09-05 ENCOUNTER — OFFICE VISIT (OUTPATIENT)
Dept: ORTHOPEDIC SURGERY | Age: 74
End: 2019-09-05

## 2019-09-05 VITALS — BODY MASS INDEX: 38.47 KG/M2 | TEMPERATURE: 97.9 F | RESPIRATION RATE: 16 BRPM | WEIGHT: 284 LBS | HEIGHT: 72 IN

## 2019-09-05 DIAGNOSIS — M16.11 PRIMARY OSTEOARTHRITIS OF RIGHT HIP: Primary | ICD-10-CM

## 2019-09-05 DIAGNOSIS — Z96.641 HX OF TOTAL HIP ARTHROPLASTY, RIGHT: ICD-10-CM

## 2019-09-05 PROCEDURE — 99024 POSTOP FOLLOW-UP VISIT: CPT | Performed by: ORTHOPAEDIC SURGERY

## 2019-09-05 NOTE — PROGRESS NOTES
This dictation was done with [employer] dictation and may contain mechanical errors related to translation. Temperature 97.9 °F (36.6 °C), temperature source Temporal, resp. rate 16, height 6' (1.829 m), weight 284 lb (128.8 kg).

## 2019-09-11 ENCOUNTER — OFFICE VISIT (OUTPATIENT)
Dept: INTERNAL MEDICINE CLINIC | Age: 74
End: 2019-09-11
Payer: MEDICARE

## 2019-09-11 VITALS
HEART RATE: 102 BPM | OXYGEN SATURATION: 97 % | RESPIRATION RATE: 12 BRPM | DIASTOLIC BLOOD PRESSURE: 70 MMHG | TEMPERATURE: 98.4 F | BODY MASS INDEX: 36.08 KG/M2 | SYSTOLIC BLOOD PRESSURE: 110 MMHG | HEIGHT: 70 IN | WEIGHT: 252 LBS

## 2019-09-11 DIAGNOSIS — I10 ESSENTIAL HYPERTENSION, BENIGN: Primary | ICD-10-CM

## 2019-09-11 DIAGNOSIS — G47.30 SLEEP APNEA, UNSPECIFIED TYPE: ICD-10-CM

## 2019-09-11 PROBLEM — Z01.818 PREOP EXAMINATION: Status: RESOLVED | Noted: 2019-08-12 | Resolved: 2019-09-11

## 2019-09-11 PROCEDURE — 1111F DSCHRG MED/CURRENT MED MERGE: CPT | Performed by: INTERNAL MEDICINE

## 2019-09-11 PROCEDURE — 99213 OFFICE O/P EST LOW 20 MIN: CPT | Performed by: INTERNAL MEDICINE

## 2019-09-11 PROCEDURE — 1036F TOBACCO NON-USER: CPT | Performed by: INTERNAL MEDICINE

## 2019-09-11 PROCEDURE — G8417 CALC BMI ABV UP PARAM F/U: HCPCS | Performed by: INTERNAL MEDICINE

## 2019-09-11 PROCEDURE — 1123F ACP DISCUSS/DSCN MKR DOCD: CPT | Performed by: INTERNAL MEDICINE

## 2019-09-11 PROCEDURE — 4040F PNEUMOC VAC/ADMIN/RCVD: CPT | Performed by: INTERNAL MEDICINE

## 2019-09-11 PROCEDURE — 3017F COLORECTAL CA SCREEN DOC REV: CPT | Performed by: INTERNAL MEDICINE

## 2019-09-11 PROCEDURE — G8427 DOCREV CUR MEDS BY ELIG CLIN: HCPCS | Performed by: INTERNAL MEDICINE

## 2019-09-11 RX ORDER — TRIAMTERENE AND HYDROCHLOROTHIAZIDE 37.5; 25 MG/1; MG/1
TABLET ORAL
Qty: 1 TABLET | Refills: 1 | Status: SHIPPED
Start: 2019-09-11 | End: 2020-03-25

## 2019-09-11 ASSESSMENT — ENCOUNTER SYMPTOMS
WHEEZING: 0
CHEST TIGHTNESS: 0
SHORTNESS OF BREATH: 0
TROUBLE SWALLOWING: 0
GASTROINTESTINAL NEGATIVE: 1
COUGH: 0

## 2019-09-11 NOTE — PROGRESS NOTES
Subjective:      Patient ID: Joseph Barnes is a 69 y.o. male. HPI  S/p discharge from hospital after hip replacement  He is home now and getting home therapy and wises walker to ambulate and does all his care with out help  Even cooking in his house  Had no more groin pain or chest tightness or dyspnea  Has no other cp gi or gu symptoms  Review of Systems   Constitutional: Negative for activity change, appetite change and unexpected weight change. HENT: Negative for trouble swallowing. Respiratory: Negative for cough, chest tightness, shortness of breath and wheezing. Cardiovascular: Negative for chest pain, palpitations and leg swelling. Gastrointestinal: Negative. Genitourinary: Negative. Neurological: Negative for dizziness, light-headedness and headaches. Objective:   Physical Exam   Constitutional: He is oriented to person, place, and time. No distress. Eyes: Pupils are equal, round, and reactive to light. Conjunctivae and EOM are normal. No scleral icterus. Neck: No JVD present. No thyromegaly present. Cardiovascular: Normal rate, regular rhythm, normal heart sounds and intact distal pulses. Exam reveals no gallop. No murmur heard. Pulmonary/Chest: Breath sounds normal. No respiratory distress. He has no wheezes. He has no rales. Musculoskeletal: He exhibits no edema. Tenderness: has no calf tenderness. Lymphadenopathy:     He has no cervical adenopathy. Neurological: He is alert and oriented to person, place, and time. Has left foot dorsiflexion weakness-chronic since his back surgery   Nursing note and vitals reviewed.       Assessment:      Post op doing well  htn conttroleld    Sleep apnea -not able to use CPAP  Plan:    decrease  Maxzide to 1/2 tablet daily  Continue current medications  See in 3  months        Angeli Cerna MD

## 2019-09-12 ENCOUNTER — TELEPHONE (OUTPATIENT)
Dept: INTERNAL MEDICINE CLINIC | Age: 74
End: 2019-09-12

## 2019-09-12 NOTE — TELEPHONE ENCOUNTER
Should this patient continue Naproxyn? He was given this in the ER, he doesn't feel like he needs it.

## 2019-09-27 ENCOUNTER — TELEPHONE (OUTPATIENT)
Dept: ORTHOPEDIC SURGERY | Age: 74
End: 2019-09-27

## 2019-10-17 ENCOUNTER — OFFICE VISIT (OUTPATIENT)
Dept: ORTHOPEDIC SURGERY | Age: 74
End: 2019-10-17

## 2019-10-17 VITALS — TEMPERATURE: 97.8 F | HEIGHT: 70 IN | WEIGHT: 252 LBS | BODY MASS INDEX: 36.08 KG/M2

## 2019-10-17 DIAGNOSIS — Z96.641 HX OF TOTAL HIP ARTHROPLASTY, RIGHT: Primary | ICD-10-CM

## 2019-10-17 PROCEDURE — 99024 POSTOP FOLLOW-UP VISIT: CPT | Performed by: PHYSICIAN ASSISTANT

## 2019-12-11 ENCOUNTER — OFFICE VISIT (OUTPATIENT)
Dept: INTERNAL MEDICINE CLINIC | Age: 74
End: 2019-12-11
Payer: MEDICARE

## 2019-12-11 VITALS
HEIGHT: 70 IN | WEIGHT: 265 LBS | BODY MASS INDEX: 37.94 KG/M2 | OXYGEN SATURATION: 96 % | DIASTOLIC BLOOD PRESSURE: 70 MMHG | SYSTOLIC BLOOD PRESSURE: 130 MMHG | HEART RATE: 82 BPM

## 2019-12-11 DIAGNOSIS — I10 ESSENTIAL HYPERTENSION, BENIGN: Primary | ICD-10-CM

## 2019-12-11 PROCEDURE — 3017F COLORECTAL CA SCREEN DOC REV: CPT | Performed by: INTERNAL MEDICINE

## 2019-12-11 PROCEDURE — G8427 DOCREV CUR MEDS BY ELIG CLIN: HCPCS | Performed by: INTERNAL MEDICINE

## 2019-12-11 PROCEDURE — 99213 OFFICE O/P EST LOW 20 MIN: CPT | Performed by: INTERNAL MEDICINE

## 2019-12-11 PROCEDURE — 1036F TOBACCO NON-USER: CPT | Performed by: INTERNAL MEDICINE

## 2019-12-11 PROCEDURE — G8417 CALC BMI ABV UP PARAM F/U: HCPCS | Performed by: INTERNAL MEDICINE

## 2019-12-11 PROCEDURE — 1123F ACP DISCUSS/DSCN MKR DOCD: CPT | Performed by: INTERNAL MEDICINE

## 2019-12-11 PROCEDURE — 4040F PNEUMOC VAC/ADMIN/RCVD: CPT | Performed by: INTERNAL MEDICINE

## 2019-12-11 PROCEDURE — G8482 FLU IMMUNIZE ORDER/ADMIN: HCPCS | Performed by: INTERNAL MEDICINE

## 2019-12-11 RX ORDER — CHLORAL HYDRATE 500 MG
3000 CAPSULE ORAL DAILY
COMMUNITY

## 2019-12-11 ASSESSMENT — ENCOUNTER SYMPTOMS
COUGH: 0
SHORTNESS OF BREATH: 0
TROUBLE SWALLOWING: 0
GASTROINTESTINAL NEGATIVE: 1
CHEST TIGHTNESS: 0

## 2019-12-16 ENCOUNTER — TELEPHONE (OUTPATIENT)
Dept: INTERNAL MEDICINE CLINIC | Age: 74
End: 2019-12-16

## 2020-01-13 ENCOUNTER — OFFICE VISIT (OUTPATIENT)
Dept: ORTHOPEDIC SURGERY | Age: 75
End: 2020-01-13
Payer: MEDICARE

## 2020-01-13 VITALS
HEART RATE: 88 BPM | WEIGHT: 269.2 LBS | SYSTOLIC BLOOD PRESSURE: 119 MMHG | TEMPERATURE: 97.2 F | DIASTOLIC BLOOD PRESSURE: 69 MMHG | HEIGHT: 70 IN | BODY MASS INDEX: 38.54 KG/M2

## 2020-01-13 PROCEDURE — 99212 OFFICE O/P EST SF 10 MIN: CPT | Performed by: PHYSICIAN ASSISTANT

## 2020-01-13 PROCEDURE — 1123F ACP DISCUSS/DSCN MKR DOCD: CPT | Performed by: PHYSICIAN ASSISTANT

## 2020-01-13 PROCEDURE — 4040F PNEUMOC VAC/ADMIN/RCVD: CPT | Performed by: PHYSICIAN ASSISTANT

## 2020-01-13 PROCEDURE — G8482 FLU IMMUNIZE ORDER/ADMIN: HCPCS | Performed by: PHYSICIAN ASSISTANT

## 2020-01-13 PROCEDURE — 1036F TOBACCO NON-USER: CPT | Performed by: PHYSICIAN ASSISTANT

## 2020-01-13 PROCEDURE — G8427 DOCREV CUR MEDS BY ELIG CLIN: HCPCS | Performed by: PHYSICIAN ASSISTANT

## 2020-01-13 PROCEDURE — G8417 CALC BMI ABV UP PARAM F/U: HCPCS | Performed by: PHYSICIAN ASSISTANT

## 2020-01-13 PROCEDURE — 3017F COLORECTAL CA SCREEN DOC REV: CPT | Performed by: PHYSICIAN ASSISTANT

## 2020-01-13 RX ORDER — TAMSULOSIN HYDROCHLORIDE 0.4 MG/1
CAPSULE ORAL
Qty: 90 CAPSULE | Refills: 1 | Status: SHIPPED | OUTPATIENT
Start: 2020-01-13 | End: 2020-07-13

## 2020-01-14 NOTE — PROGRESS NOTES
Subjective:      Patient ID: Alfonzo Ha is a 69 y.o.  male. Chief Complaint   Patient presents with    Post-Op Check     right ERYN on 8/20/19        HPI:  He is here for annual follow up on right hip arthroplasty. The date of procedure: 8/20/2019. Denies any other complaints. Review of Systems:   Negative for fever or chills. Past Medical History:   Diagnosis Date    Amnesia 01/78    Arthritis     Erythema     Antral    Essential thrombocythemia ([address]     HBP (high blood pressure)     Nodule     Stomach       Family History   Problem Relation Age of Onset    Cancer Mother         lymphoma    Heart Failure Father     Hypertension Father     Heart Disease Father        Past Surgical History:   Procedure Laterality Date    BACK SURGERY      BONE MARROW BIOPSY      CARPAL TUNNEL RELEASE      COLONOSCOPY  5/09    Polypectomy    COLONOSCOPY  1/15    normal - DR. Mckinney    JOINT REPLACEMENT Left     JOINT REPLACEMENT Right 08/20/2019    RIGHT LATERAL TOTAL HIP REPLACEMENT (Right Hip)    KNEE SURGERY  1969    Left knee    KNEE SURGERY Left     TOTAL HIP ARTHROPLASTY Right 8/20/2019    RIGHT LATERAL TOTAL HIP REPLACEMENT performed by Leonila Castro MD at [address] [place] [address] History     Tobacco Use    Smoking status: Never Smoker    Smokeless tobacco: Never Used   Substance Use Topics    Alcohol use: Yes     Comment: seldom    Drug use: No       Current Outpatient Medications   Medication Sig Dispense Refill    tamsulosin (FLOMAX) 0.4 MG capsule TAKE 1 CAPSULE BY MOUTH EVERY DAY 90 capsule 1    Handicap Placard MISC by Does not apply route 1 each 0    Omega-3 Fatty Acids (FISH OIL) 1000 MG CAPS Take 3,000 mg by mouth daily      triamterene-hydrochlorothiazide (MAXZIDE-25) 37.5-25 MG per tablet TAKE 1/2  TABLET BY MOUTH EVERY DAY 1 tablet 1    naproxen (NAPROXEN) 500 MG EC tablet Take 1 tablet by mouth 2 times daily (with meals) 20 tablet 0    aspirin EC 81 MG EC tablet Take 1 tablet by mouth 2 times daily Take for DVT blood clot prophylaxis. Please avoid missing doses. 60 tablet 0    vitamin D (CHOLECALCIFEROL) 1000 UNIT TABS tablet Take 1,000 Units by mouth      hydroxyurea (HYDREA) 500 MG chemo capsule TAKE TWO CAPSULES BY MOUTH DAILY 60 capsule 2    IRON PO Take 65 mg by mouth daily. No current facility-administered medications for this visit. Objective:   He  is alert, oriented x 3, pleasant, well nourished, developed and in no acute distress. /69   Pulse 88   Temp 97.2 °F (36.2 °C) (Temporal)   Ht 5' 10\" (1.778 m)   Wt 269 lb 3.2 oz (122.1 kg)   BMI 38.63 kg/m²      HIP EXAM:  Examination of the right hip shows: There is no pain with internal and external rotation. There is no pain with flexion and extension. There is no pain with active SLR. Leg lengths: equal  There is no pain with weight bearing. X Rays: performed in the office today:     AP Pelvis, AP and Frog Leg Lateral  Right Hip: There is a right as well as left total hip arthroplasty present. The alignment is satisfactory. There are no signs of failure, dislocation or loosening. Assessment:       ICD-10-CM    1. Hx of total hip arthroplasty, right 8/20/2019 Z96.641 XR HIP 2-3 VW W PELVIS RIGHT        Plan:     Clinically and radiographically stable right hip arthroplasty. Graphically stable left hip arthroplasty. Activity restrictions discussed today. Need for dental prophylactics discussed today. Recommend annual x ray evaluation. Left hip arthroplasty was performed by Dr. Ermias Wyatt as well couple years ago. Next visit we will obtain x-rays of both left and right hip. Follow Up: 12 months. Call or return to clinic prn if these symptoms worsen or fail to improve as anticipated.

## 2020-03-11 ENCOUNTER — OFFICE VISIT (OUTPATIENT)
Dept: INTERNAL MEDICINE CLINIC | Age: 75
End: 2020-03-11
Payer: MEDICARE

## 2020-03-11 VITALS
HEART RATE: 77 BPM | WEIGHT: 271 LBS | DIASTOLIC BLOOD PRESSURE: 80 MMHG | HEIGHT: 70 IN | SYSTOLIC BLOOD PRESSURE: 130 MMHG | TEMPERATURE: 98.2 F | RESPIRATION RATE: 18 BRPM | BODY MASS INDEX: 38.8 KG/M2 | OXYGEN SATURATION: 98 %

## 2020-03-11 LAB
ANION GAP SERPL CALCULATED.3IONS-SCNC: 12 MMOL/L (ref 3–16)
BASOPHILS ABSOLUTE: 0.1 K/UL (ref 0–0.2)
BASOPHILS RELATIVE PERCENT: 2.2 %
BUN BLDV-MCNC: 15 MG/DL (ref 7–20)
CALCIUM SERPL-MCNC: 9.2 MG/DL (ref 8.3–10.6)
CHLORIDE BLD-SCNC: 102 MMOL/L (ref 99–110)
CO2: 26 MMOL/L (ref 21–32)
CREAT SERPL-MCNC: 0.7 MG/DL (ref 0.8–1.3)
EOSINOPHILS ABSOLUTE: 0.2 K/UL (ref 0–0.6)
EOSINOPHILS RELATIVE PERCENT: 5 %
GFR AFRICAN AMERICAN: >60
GFR NON-AFRICAN AMERICAN: >60
GLUCOSE BLD-MCNC: 84 MG/DL (ref 70–99)
HCT VFR BLD CALC: 38.4 % (ref 40.5–52.5)
HEMOGLOBIN: 13.2 G/DL (ref 13.5–17.5)
LYMPHOCYTES ABSOLUTE: 1.1 K/UL (ref 1–5.1)
LYMPHOCYTES RELATIVE PERCENT: 21.5 %
MCH RBC QN AUTO: 34.7 PG (ref 26–34)
MCHC RBC AUTO-ENTMCNC: 34.5 G/DL (ref 31–36)
MCV RBC AUTO: 100.6 FL (ref 80–100)
MONOCYTES ABSOLUTE: 0.4 K/UL (ref 0–1.3)
MONOCYTES RELATIVE PERCENT: 7.2 %
NEUTROPHILS ABSOLUTE: 3.2 K/UL (ref 1.7–7.7)
NEUTROPHILS RELATIVE PERCENT: 64.1 %
PDW BLD-RTO: 15.2 % (ref 12.4–15.4)
PLATELET # BLD: 317 K/UL (ref 135–450)
PMV BLD AUTO: 7.2 FL (ref 5–10.5)
POTASSIUM SERPL-SCNC: 4.4 MMOL/L (ref 3.5–5.1)
RBC # BLD: 3.81 M/UL (ref 4.2–5.9)
SODIUM BLD-SCNC: 140 MMOL/L (ref 136–145)
TSH SERPL DL<=0.05 MIU/L-ACNC: 2.28 UIU/ML (ref 0.27–4.2)
WBC # BLD: 5 K/UL (ref 4–11)

## 2020-03-11 PROCEDURE — 1123F ACP DISCUSS/DSCN MKR DOCD: CPT | Performed by: INTERNAL MEDICINE

## 2020-03-11 PROCEDURE — G8417 CALC BMI ABV UP PARAM F/U: HCPCS | Performed by: INTERNAL MEDICINE

## 2020-03-11 PROCEDURE — G8510 SCR DEP NEG, NO PLAN REQD: HCPCS | Performed by: INTERNAL MEDICINE

## 2020-03-11 PROCEDURE — G8482 FLU IMMUNIZE ORDER/ADMIN: HCPCS | Performed by: INTERNAL MEDICINE

## 2020-03-11 PROCEDURE — 1036F TOBACCO NON-USER: CPT | Performed by: INTERNAL MEDICINE

## 2020-03-11 PROCEDURE — 4040F PNEUMOC VAC/ADMIN/RCVD: CPT | Performed by: INTERNAL MEDICINE

## 2020-03-11 PROCEDURE — 36415 COLL VENOUS BLD VENIPUNCTURE: CPT | Performed by: INTERNAL MEDICINE

## 2020-03-11 PROCEDURE — 99213 OFFICE O/P EST LOW 20 MIN: CPT | Performed by: INTERNAL MEDICINE

## 2020-03-11 PROCEDURE — 3017F COLORECTAL CA SCREEN DOC REV: CPT | Performed by: INTERNAL MEDICINE

## 2020-03-11 PROCEDURE — G8427 DOCREV CUR MEDS BY ELIG CLIN: HCPCS | Performed by: INTERNAL MEDICINE

## 2020-03-11 ASSESSMENT — PATIENT HEALTH QUESTIONNAIRE - PHQ9
SUM OF ALL RESPONSES TO PHQ9 QUESTIONS 1 & 2: 0
SUM OF ALL RESPONSES TO PHQ QUESTIONS 1-9: 0
1. LITTLE INTEREST OR PLEASURE IN DOING THINGS: 0
2. FEELING DOWN, DEPRESSED OR HOPELESS: 0
SUM OF ALL RESPONSES TO PHQ QUESTIONS 1-9: 0

## 2020-03-11 ASSESSMENT — ENCOUNTER SYMPTOMS
COUGH: 0
WHEEZING: 0
TROUBLE SWALLOWING: 0
GASTROINTESTINAL NEGATIVE: 1
CHEST TIGHTNESS: 0
SHORTNESS OF BREATH: 0

## 2020-03-25 RX ORDER — TRIAMTERENE AND HYDROCHLOROTHIAZIDE 37.5; 25 MG/1; MG/1
TABLET ORAL
Qty: 90 TABLET | Refills: 1 | Status: SHIPPED | OUTPATIENT
Start: 2020-03-25 | End: 2020-12-23 | Stop reason: SDUPTHER

## 2020-06-15 ENCOUNTER — VIRTUAL VISIT (OUTPATIENT)
Dept: INTERNAL MEDICINE CLINIC | Age: 75
End: 2020-06-15
Payer: MEDICARE

## 2020-06-15 PROCEDURE — 99442 PR PHYS/QHP TELEPHONE EVALUATION 11-20 MIN: CPT | Performed by: INTERNAL MEDICINE

## 2020-06-15 ASSESSMENT — ENCOUNTER SYMPTOMS
GASTROINTESTINAL NEGATIVE: 1
COUGH: 0
CHEST TIGHTNESS: 0
WHEEZING: 0
SHORTNESS OF BREATH: 0

## 2020-07-13 RX ORDER — TAMSULOSIN HYDROCHLORIDE 0.4 MG/1
CAPSULE ORAL
Qty: 90 CAPSULE | Refills: 1 | Status: SHIPPED | OUTPATIENT
Start: 2020-07-13 | End: 2020-12-23 | Stop reason: SDUPTHER

## 2020-09-29 ENCOUNTER — OFFICE VISIT (OUTPATIENT)
Dept: INTERNAL MEDICINE CLINIC | Age: 75
End: 2020-09-29
Payer: MEDICARE

## 2020-09-29 VITALS
SYSTOLIC BLOOD PRESSURE: 124 MMHG | BODY MASS INDEX: 40.61 KG/M2 | HEART RATE: 82 BPM | RESPIRATION RATE: 22 BRPM | OXYGEN SATURATION: 99 % | WEIGHT: 283 LBS | TEMPERATURE: 97.5 F | DIASTOLIC BLOOD PRESSURE: 70 MMHG

## 2020-09-29 PROCEDURE — G8427 DOCREV CUR MEDS BY ELIG CLIN: HCPCS | Performed by: INTERNAL MEDICINE

## 2020-09-29 PROCEDURE — 1123F ACP DISCUSS/DSCN MKR DOCD: CPT | Performed by: INTERNAL MEDICINE

## 2020-09-29 PROCEDURE — 3288F FALL RISK ASSESSMENT DOCD: CPT | Performed by: INTERNAL MEDICINE

## 2020-09-29 PROCEDURE — 99213 OFFICE O/P EST LOW 20 MIN: CPT | Performed by: INTERNAL MEDICINE

## 2020-09-29 PROCEDURE — 1036F TOBACCO NON-USER: CPT | Performed by: INTERNAL MEDICINE

## 2020-09-29 PROCEDURE — 3017F COLORECTAL CA SCREEN DOC REV: CPT | Performed by: INTERNAL MEDICINE

## 2020-09-29 PROCEDURE — G8417 CALC BMI ABV UP PARAM F/U: HCPCS | Performed by: INTERNAL MEDICINE

## 2020-09-29 PROCEDURE — 4040F PNEUMOC VAC/ADMIN/RCVD: CPT | Performed by: INTERNAL MEDICINE

## 2020-09-29 ASSESSMENT — ENCOUNTER SYMPTOMS
WHEEZING: 0
COUGH: 0
GASTROINTESTINAL NEGATIVE: 1
SHORTNESS OF BREATH: 0
CHEST TIGHTNESS: 0

## 2020-09-29 NOTE — PROGRESS NOTES
Subjective:      Patient ID: Melody Day is a 70 y.o. male. JIMMIE  Feels well but for aches and pains in his joints and takes ibuprofen with relief and takes rarely  Sleeps well  Has no other cp gi or gu symptoms and has nocturia x 1 time. Review of Systems   Constitutional: Negative for activity change, appetite change and unexpected weight change. Respiratory: Negative for cough, chest tightness, shortness of breath and wheezing. Cardiovascular: Negative for chest pain, palpitations and leg swelling. Gastrointestinal: Negative. Genitourinary: Negative. Neurological: Negative for dizziness, light-headedness and headaches. Objective:   Physical Exam  Vitals signs and nursing note reviewed. Constitutional:       General: He is not in acute distress. Eyes:      General: No scleral icterus. Extraocular Movements: Extraocular movements intact. Conjunctiva/sclera: Conjunctivae normal.      Pupils: Pupils are equal, round, and reactive to light. Neck:      Thyroid: No thyromegaly. Vascular: No carotid bruit or JVD. Cardiovascular:      Rate and Rhythm: Normal rate and regular rhythm. Pulses: Normal pulses. Heart sounds: Normal heart sounds. No murmur. No gallop. Pulmonary:      Effort: No respiratory distress. Breath sounds: Normal breath sounds. No wheezing, rhonchi or rales. Musculoskeletal:      Right lower leg: Edema (has 1+ edema of both legs and he says   much less in am ) present. Left lower leg: Edema present. Lymphadenopathy:      Cervical: No cervical adenopathy. Neurological:      Mental Status: He is alert and oriented to person, place, and time. Sensory: Sensory deficit: htn controlled.          Assessment:      htn well controlled  Dependent edema due to obesity-unchnaged       Plan:    Had flu vaccine  Continue current medications  See in 3 months        Avinash Paredes MD

## 2020-12-23 ENCOUNTER — OFFICE VISIT (OUTPATIENT)
Dept: INTERNAL MEDICINE CLINIC | Age: 75
End: 2020-12-23
Payer: MEDICARE

## 2020-12-23 PROCEDURE — 99422 OL DIG E/M SVC 11-20 MIN: CPT | Performed by: INTERNAL MEDICINE

## 2020-12-23 RX ORDER — TRIAMTERENE AND HYDROCHLOROTHIAZIDE 37.5; 25 MG/1; MG/1
TABLET ORAL
Qty: 1 TABLET | Refills: 0
Start: 2020-12-23 | End: 2022-05-23 | Stop reason: SDUPTHER

## 2020-12-23 RX ORDER — TRIAMTERENE AND HYDROCHLOROTHIAZIDE 37.5; 25 MG/1; MG/1
TABLET ORAL
Qty: 90 TABLET | Refills: 1 | Status: SHIPPED | OUTPATIENT
Start: 2020-12-23 | End: 2020-12-23

## 2020-12-23 RX ORDER — TAMSULOSIN HYDROCHLORIDE 0.4 MG/1
CAPSULE ORAL
Qty: 90 CAPSULE | Refills: 1 | Status: SHIPPED | OUTPATIENT
Start: 2020-12-23 | End: 2021-05-07

## 2020-12-23 ASSESSMENT — ENCOUNTER SYMPTOMS
CHEST TIGHTNESS: 0
COUGH: 0
SHORTNESS OF BREATH: 0
WHEEZING: 0

## 2021-03-30 ENCOUNTER — VIRTUAL VISIT (OUTPATIENT)
Dept: INTERNAL MEDICINE CLINIC | Age: 76
End: 2021-03-30
Payer: MEDICARE

## 2021-03-30 DIAGNOSIS — Z00.00 ROUTINE GENERAL MEDICAL EXAMINATION AT A HEALTH CARE FACILITY: Primary | ICD-10-CM

## 2021-03-30 PROCEDURE — 1123F ACP DISCUSS/DSCN MKR DOCD: CPT | Performed by: NURSE PRACTITIONER

## 2021-03-30 PROCEDURE — 4040F PNEUMOC VAC/ADMIN/RCVD: CPT | Performed by: NURSE PRACTITIONER

## 2021-03-30 PROCEDURE — 3017F COLORECTAL CA SCREEN DOC REV: CPT | Performed by: NURSE PRACTITIONER

## 2021-03-30 PROCEDURE — G8484 FLU IMMUNIZE NO ADMIN: HCPCS | Performed by: NURSE PRACTITIONER

## 2021-03-30 PROCEDURE — G0438 PPPS, INITIAL VISIT: HCPCS | Performed by: NURSE PRACTITIONER

## 2021-03-30 SDOH — ECONOMIC STABILITY: INCOME INSECURITY: HOW HARD IS IT FOR YOU TO PAY FOR THE VERY BASICS LIKE FOOD, HOUSING, MEDICAL CARE, AND HEATING?: NOT HARD AT ALL

## 2021-03-30 SDOH — ECONOMIC STABILITY: FOOD INSECURITY: WITHIN THE PAST 12 MONTHS, YOU WORRIED THAT YOUR FOOD WOULD RUN OUT BEFORE YOU GOT MONEY TO BUY MORE.: NEVER TRUE

## 2021-03-30 SDOH — ECONOMIC STABILITY: TRANSPORTATION INSECURITY
IN THE PAST 12 MONTHS, HAS THE LACK OF TRANSPORTATION KEPT YOU FROM MEDICAL APPOINTMENTS OR FROM GETTING MEDICATIONS?: NO

## 2021-03-30 SDOH — ECONOMIC STABILITY: FOOD INSECURITY: WITHIN THE PAST 12 MONTHS, THE FOOD YOU BOUGHT JUST DIDN'T LAST AND YOU DIDN'T HAVE MONEY TO GET MORE.: NEVER TRUE

## 2021-03-30 ASSESSMENT — PATIENT HEALTH QUESTIONNAIRE - PHQ9
2. FEELING DOWN, DEPRESSED OR HOPELESS: 0
1. LITTLE INTEREST OR PLEASURE IN DOING THINGS: 0
SUM OF ALL RESPONSES TO PHQ QUESTIONS 1-9: 0

## 2021-03-30 ASSESSMENT — LIFESTYLE VARIABLES
AUDIT TOTAL SCORE: INCOMPLETE
HOW OFTEN DO YOU HAVE A DRINK CONTAINING ALCOHOL: NEVER
HOW OFTEN DO YOU HAVE A DRINK CONTAINING ALCOHOL: 0
AUDIT-C TOTAL SCORE: INCOMPLETE

## 2021-03-30 NOTE — PATIENT INSTRUCTIONS
Personalized Preventive Plan for Aisha Rodriguez - 3/30/2021  Medicare offers a range of preventive health benefits. Some of the tests and screenings are paid in full while other may be subject to a deductible, co-insurance, and/or copay. Some of these benefits include a comprehensive review of your medical history including lifestyle, illnesses that may run in your family, and various assessments and screenings as appropriate. After reviewing your medical record and screening and assessments performed today your provider may have ordered immunizations, labs, imaging, and/or referrals for you. A list of these orders (if applicable) as well as your Preventive Care list are included within your After Visit Summary for your review. Other Preventive Recommendations:    · A preventive eye exam performed by an eye specialist is recommended every 1-2 years to screen for glaucoma; cataracts, macular degeneration, and other eye disorders. · A preventive dental visit is recommended every 6 months. · Try to get at least 150 minutes of exercise per week or 10,000 steps per day on a pedometer . · Order or download the FREE \"Exercise & Physical Activity: Your Everyday Guide\" from [employer]. Call 6-543.797.6364 or search [employer] online. · You need 3170-0623 mg of calcium and 8704-0548 IU of vitamin D per day. It is possible to meet your calcium requirement with diet alone, but a vitamin D supplement is usually necessary to meet this goal.  · When exposed to the sun, use a sunscreen that protects against both UVA and UVB radiation with an SPF of 30 or greater. Reapply every 2 to 3 hours or after sweating, drying off with a towel, or swimming. · Always wear a seat belt when traveling in a car. Always wear a helmet when riding a bicycle or motorcycle.

## 2021-03-30 NOTE — PROGRESS NOTES
Medicare Annual Wellness Visit  Are Name: Tri Carter Date: 3/30/2021   MRN: <[id number]> Sex: Male   Age: 70 y.o. Ethnicity: Non-/Non    : 1945 Race: Pérez Cornell is here for Medicare AWV    Screenings for behavioral, psychosocial and functional/safety risks, and cognitive dysfunction are all negative except as indicated below. These results, as well as other patient data from the [address] form, are documented in Flowsheets linked to this Encounter. No Known Allergies    Prior to Visit Medications    Medication Sig Taking? Authorizing Provider   tamsulosin (FLOMAX) 0.4 MG capsule TAKE 1 CAPSULE BY MOUTH EVERY DAY Yes Froy Huynh MD   triamterene-hydroCHLOROthiazide (PQRTVME-93) 37.5-25 MG per tablet TAKE 1/2  TABLET BY MOUTH EVERY DAY Yes Froy Huynh MD   Handicap Placard MISC by Does not apply route Yes Froy Huynh MD   Omega-3 Fatty Acids (FISH OIL) 1000 MG CAPS Take 3,000 mg by mouth daily Yes Historical Provider, MD   vitamin D (CHOLECALCIFEROL) 1000 UNIT TABS tablet Take 1,000 Units by mouth Yes Historical Provider, MD   hydroxyurea (HYDREA) 500 MG chemo capsule TAKE TWO CAPSULES BY MOUTH DAILY Yes Gurvinder Mora MD   IRON PO Take 65 mg by mouth daily. Yes Historical Provider, MD   aspirin EC 81 MG EC tablet Take 1 tablet by mouth 2 times daily Take for DVT blood clot prophylaxis. Please avoid missing doses. Miroslava Simmons, APRN - CNP       Past Medical History:   Diagnosis Date    Amnesia     Arthritis     Erythema     Antral    Essential [address] ([facility] [address]     HBP (high blood pressure)     Nodule     Stomach       Past Surgical History:   Procedure Laterality Date    BACK SURGERY      BONE MARROW BIOPSY      CARPAL TUNNEL RELEASE      COLONOSCOPY      Polypectomy    COLONOSCOPY  1/15    normal - DR. Mckinney    JOINT REPLACEMENT Left     JOINT REPLACEMENT Right 2019    RIGHT LATERAL TOTAL HIP REPLACEMENT (Right Hip)    KNEE SURGERY  1969    Left knee    KNEE SURGERY Left     TOTAL HIP ARTHROPLASTY Right 8/20/2019    RIGHT LATERAL TOTAL HIP REPLACEMENT performed by Polo Nance MD at [address] [place] [address] History   Problem Relation Age of Onset    Cancer Mother         lymphoma    Heart Failure Father     Hypertension Father     Heart Disease Father        CareTeam (Including outside providers/suppliers regularly involved in providing care):   Patient Care Team:  Dinorah Mahoney MD as PCP - Lorena Medina MD as PCP - Hematology/Oncology (Hematology and Oncology)  Dinorah Mahoney MD as PCP - [facility] Empaneled Provider  June Cuellar as  (Hematology and Oncology)    Wt Readings from Last 3 Encounters:   09/29/20 283 lb (128.4 kg)   03/11/20 271 lb (122.9 kg)   01/13/20 269 lb 3.2 oz (122.1 kg)      Patient-Reported Vitals 3/30/2021   Patient-Reported Weight 282 lbs      There is no height or weight on file to calculate BMI. Based upon direct observation of the patient, evaluation of cognition reveals recent and remote memory intact. Patient's complete Health Risk Assessment and screening values have been reviewed and are found in Flowsheets. The following problems were reviewed today and where indicated follow up appointments were made and/or referrals ordered.     Positive Risk Factor Screenings with Interventions:           Health Habits/Nutrition:  Health Habits/Nutrition  Do you exercise for at least 20 minutes 2-3 times per week?: Yes  Have you lost any weight without trying in the past 3 months?: No  Do you eat only one meal per day?: No  Have you seen the dentist within the past year?: (!) No     Health Habits/Nutrition Interventions:  · Dental exam overdue:  patient declines dental evaluation    Hearing/Vision:  No exam data present  Hearing/Vision  Do you or your family notice any trouble with your hearing that hasn't been managed with hearing aids?: No  Do you have difficulty driving, watching TV, or doing any of your daily activities because of your eyesight?: No  Have you had an eye exam within the past year?: (!) No  Hearing/Vision Interventions:  · Vision concerns:  patient encouraged to make appointment with his/her eye specialist      Personalized Preventive Plan   Current Health Maintenance Status  Immunization History   Administered Date(s) Administered    Influenza 09/23/2011    Influenza Whole 09/08/2009    Influenza, High Dose (Fluzone 65 yrs and older) 10/12/2012, 10/07/2015, 09/13/2016, 09/13/2016, 10/18/2019    Influenza, High-dose, Quadv, 65 yrs +, IM (Fluzone) 09/11/2020    Pneumococcal Conjugate 13-valent ([id number]) 08/18/2017    Pneumococcal Polysaccharide ([id number]) 07/06/2012    Tdap (Boostrix, Adacel) 07/28/2014    Zoster Recombinant (Shingrix) 10/18/2019, 01/06/2020        Health Maintenance   Topic Date Due    COVID-19 Vaccine (1) Never done   [employer] Visit (AWV)  Never done    Potassium monitoring  03/11/2021    Creatinine monitoring  03/11/2021    Lipid screen  11/29/2022    DTaP/Tdap/Td vaccine (2 - Td) 07/28/2024    Colon cancer screen colonoscopy  07/19/2029    Flu vaccine  Completed    Shingles Vaccine  Completed    Pneumococcal 65+ years Vaccine  Completed    Hepatitis C screen  Completed    Hepatitis A vaccine  Aged Out    Hepatitis B vaccine  Aged Out    Hib vaccine  Aged Out    Meningococcal (ACWY) vaccine  Aged Out     Recommendations for Handprint Due: see orders and patient instructions/AVS.  . Recommended screening schedule for the next 5-10 years is provided to the patient in written form: see Patient Instructions/AVS.    Windy Franco was seen today for medicare awv.     Diagnoses and all orders for this visit:    Routine general medical examination at a health care facility               Carlos Quintana is a 70 y.o. male being evaluated by a Virtual Visit (phone) encounter to address concerns as mentioned above. A caregiver was present when appropriate. Due to this being a TeleHealth encounter (During VJDZP-55 public health emergency), evaluation of the following organ systems was limited: Vitals/Constitutional/EENT/Resp/CV/GI//MS/Neuro/Skin/Heme-Lymph-Imm. Pursuant to the emergency declaration under the [address] [place] [address] and the [employer] Act, this Virtual Visit was conducted with patient's (and/or legal guardian's) consent, to reduce the patient's risk of exposure to COVID-19 and provide necessary medical care. The patient (and/or legal guardian) has also been advised to contact this office for worsening conditions or problems, and seek emergency medical treatment and/or call 911 if deemed necessary. Patient identification was verified at the start of the visit: Yes    Services were provided through phone to substitute for in-person clinic visit. Patient and provider were located at their individual homes. --TAWANA Silver CNP on 3/30/2021 at 2:41 PM    An electronic signature was used to authenticate this note.

## 2021-04-02 ENCOUNTER — OFFICE VISIT (OUTPATIENT)
Dept: INTERNAL MEDICINE CLINIC | Age: 76
End: 2021-04-02
Payer: MEDICARE

## 2021-04-02 VITALS
DIASTOLIC BLOOD PRESSURE: 84 MMHG | BODY MASS INDEX: 40.89 KG/M2 | TEMPERATURE: 97.5 F | HEART RATE: 93 BPM | OXYGEN SATURATION: 96 % | SYSTOLIC BLOOD PRESSURE: 130 MMHG | RESPIRATION RATE: 18 BRPM | WEIGHT: 285 LBS

## 2021-04-02 DIAGNOSIS — I10 ESSENTIAL HYPERTENSION, BENIGN: Primary | ICD-10-CM

## 2021-04-02 DIAGNOSIS — Z12.5 PROSTATE CANCER SCREENING: ICD-10-CM

## 2021-04-02 LAB
ANION GAP SERPL CALCULATED.3IONS-SCNC: 7 MMOL/L (ref 3–16)
BUN BLDV-MCNC: 15 MG/DL (ref 7–20)
CALCIUM SERPL-MCNC: 9.1 MG/DL (ref 8.3–10.6)
CHLORIDE BLD-SCNC: 106 MMOL/L (ref 99–110)
CO2: 30 MMOL/L (ref 21–32)
CREAT SERPL-MCNC: 0.8 MG/DL (ref 0.8–1.3)
GFR AFRICAN AMERICAN: >60
GFR NON-AFRICAN AMERICAN: >60
GLUCOSE BLD-MCNC: 104 MG/DL (ref 70–99)
POTASSIUM SERPL-SCNC: 4.5 MMOL/L (ref 3.5–5.1)
PROSTATE SPECIFIC ANTIGEN: 3.79 NG/ML (ref 0–4)
SODIUM BLD-SCNC: 143 MMOL/L (ref 136–145)

## 2021-04-02 PROCEDURE — 1036F TOBACCO NON-USER: CPT | Performed by: INTERNAL MEDICINE

## 2021-04-02 PROCEDURE — 1123F ACP DISCUSS/DSCN MKR DOCD: CPT | Performed by: INTERNAL MEDICINE

## 2021-04-02 PROCEDURE — G8417 CALC BMI ABV UP PARAM F/U: HCPCS | Performed by: INTERNAL MEDICINE

## 2021-04-02 PROCEDURE — 4040F PNEUMOC VAC/ADMIN/RCVD: CPT | Performed by: INTERNAL MEDICINE

## 2021-04-02 PROCEDURE — G8427 DOCREV CUR MEDS BY ELIG CLIN: HCPCS | Performed by: INTERNAL MEDICINE

## 2021-04-02 PROCEDURE — 99213 OFFICE O/P EST LOW 20 MIN: CPT | Performed by: INTERNAL MEDICINE

## 2021-04-02 PROCEDURE — 3017F COLORECTAL CA SCREEN DOC REV: CPT | Performed by: INTERNAL MEDICINE

## 2021-04-02 PROCEDURE — 36415 COLL VENOUS BLD VENIPUNCTURE: CPT | Performed by: INTERNAL MEDICINE

## 2021-04-02 ASSESSMENT — ENCOUNTER SYMPTOMS
GASTROINTESTINAL NEGATIVE: 1
TROUBLE SWALLOWING: 0
CHEST TIGHTNESS: 0
WHEEZING: 0
COUGH: 0
STRIDOR: 0

## 2021-04-02 NOTE — PROGRESS NOTES
Subjective:      Patient ID: Mikala Humphrey is a 70 y.o. male. Chief Complaint   Patient presents with    Hypertension        HPI  Has no symptoms other than joint pains and tylenol helps pain   Has nocturia x2 times  Has no other cp gi or gu symptoms  Sleeps well -has no used CPAP in years   Review of Systems   Constitutional: Negative for activity change, appetite change and unexpected weight change. HENT: Negative for trouble swallowing. Respiratory: Negative for cough, chest tightness, wheezing and stridor. Cardiovascular: Negative for chest pain, palpitations and leg swelling. Gastrointestinal: Negative. Genitourinary: Negative. Neurological: Negative for dizziness, light-headedness and headaches. Current Outpatient Medications   Medication Sig Dispense Refill    tamsulosin (FLOMAX) 0.4 MG capsule TAKE 1 CAPSULE BY MOUTH EVERY DAY 90 capsule 1    triamterene-hydroCHLOROthiazide (MAXZIDE-25) 37.5-25 MG per tablet TAKE 1/2  TABLET BY MOUTH EVERY DAY 1 tablet 0    Handicap Placard MISC by Does not apply route 1 each 0    Omega-3 Fatty Acids (FISH OIL) 1000 MG CAPS Take 3,000 mg by mouth daily      vitamin D (CHOLECALCIFEROL) 1000 UNIT TABS tablet Take 1,000 Units by mouth      hydroxyurea (HYDREA) 500 MG chemo capsule TAKE TWO CAPSULES BY MOUTH DAILY 60 capsule 2    IRON PO Take 65 mg by mouth daily.  aspirin EC 81 MG EC tablet Take 1 tablet by mouth 2 times daily Take for DVT blood clot prophylaxis. Please avoid missing doses. 60 tablet 0     No current facility-administered medications for this visit. No results for input(s): WBC, HGB, HCT, MCV, PLT in the last 720 hours.      Lab Results   Component Value Date     03/11/2020    K 4.4 03/11/2020     03/11/2020    CO2 26 03/11/2020    BUN 15 03/11/2020    CREATININE 0.7 03/11/2020    GLUCOSE 84 03/11/2020    GLUCOSE 111 06/16/2017    CALCIUM 9.2 03/11/2020        Lab Results   Component Value Date    CHOL 159 11/29/2017     Lab Results   Component Value Date    TRIG 96 11/29/2017     Lab Results   Component Value Date    HDL 43 11/29/2017     Lab Results   Component Value Date    LDLCALC 97 11/29/2017     Lab Results   Component Value Date    LABVLDL 19 11/29/2017     No results found for: CHOLHDLRATIO     Objective:   Physical Exam  Vitals signs and nursing note reviewed. Constitutional:       General: He is not in acute distress. Eyes:      General: No scleral icterus. Extraocular Movements: Extraocular movements intact. Conjunctiva/sclera: Conjunctivae normal.      Pupils: Pupils are equal, round, and reactive to light. Neck:      Thyroid: No thyromegaly. Vascular: No carotid bruit or JVD. Cardiovascular:      Rate and Rhythm: Normal rate and regular rhythm. Pulses: Normal pulses. Heart sounds: Normal heart sounds. No murmur. No gallop. Pulmonary:      Effort: No respiratory distress. Breath sounds: Normal breath sounds. No wheezing or rales. Musculoskeletal:      Right lower leg: Edema (1+) present. Left lower leg: Edema (trace) present. Lymphadenopathy:      Cervical: No cervical adenopathy. Neurological:      Mental Status: He is alert and oriented to person, place, and time.          Assessment:      htn controlled  BPH symptoms are stable       Plan:      Continue current medications  Will check bmp and PSA  See in 3  months        Criss Newby MD

## 2021-05-07 RX ORDER — TAMSULOSIN HYDROCHLORIDE 0.4 MG/1
CAPSULE ORAL
Qty: 90 CAPSULE | Refills: 1 | Status: SHIPPED | OUTPATIENT
Start: 2021-05-07 | End: 2021-11-19 | Stop reason: SDUPTHER

## 2021-07-14 ENCOUNTER — OFFICE VISIT (OUTPATIENT)
Dept: INTERNAL MEDICINE CLINIC | Age: 76
End: 2021-07-14
Payer: MEDICARE

## 2021-07-14 VITALS
DIASTOLIC BLOOD PRESSURE: 80 MMHG | WEIGHT: 285.6 LBS | TEMPERATURE: 97.7 F | BODY MASS INDEX: 40.98 KG/M2 | HEART RATE: 85 BPM | RESPIRATION RATE: 16 BRPM | OXYGEN SATURATION: 98 % | SYSTOLIC BLOOD PRESSURE: 128 MMHG

## 2021-07-14 DIAGNOSIS — I10 ESSENTIAL HYPERTENSION, BENIGN: Primary | ICD-10-CM

## 2021-07-14 DIAGNOSIS — D47.3 ESSENTIAL THROMBOCYTHEMIA (HCC): ICD-10-CM

## 2021-07-14 PROCEDURE — G8417 CALC BMI ABV UP PARAM F/U: HCPCS | Performed by: INTERNAL MEDICINE

## 2021-07-14 PROCEDURE — 1036F TOBACCO NON-USER: CPT | Performed by: INTERNAL MEDICINE

## 2021-07-14 PROCEDURE — 1123F ACP DISCUSS/DSCN MKR DOCD: CPT | Performed by: INTERNAL MEDICINE

## 2021-07-14 PROCEDURE — G8427 DOCREV CUR MEDS BY ELIG CLIN: HCPCS | Performed by: INTERNAL MEDICINE

## 2021-07-14 PROCEDURE — 4040F PNEUMOC VAC/ADMIN/RCVD: CPT | Performed by: INTERNAL MEDICINE

## 2021-07-14 PROCEDURE — 99213 OFFICE O/P EST LOW 20 MIN: CPT | Performed by: INTERNAL MEDICINE

## 2021-07-14 PROCEDURE — 3017F COLORECTAL CA SCREEN DOC REV: CPT | Performed by: INTERNAL MEDICINE

## 2021-07-14 ASSESSMENT — ENCOUNTER SYMPTOMS
GASTROINTESTINAL NEGATIVE: 1
WHEEZING: 0
COUGH: 0
SHORTNESS OF BREATH: 0
CHEST TIGHTNESS: 0

## 2021-07-14 NOTE — PROGRESS NOTES
Subjective:      Patient ID: Alverto Powell is a 78 y.o. male. Chief Complaint   Patient presents with    3 Month Follow-Up        HPI  He feels fien and has no new symptoms  Sees hematology every 4 months  Has no cp gi or gu symptoms  Review of Systems   Constitutional: Negative for activity change, appetite change and unexpected weight change. Respiratory: Negative for cough, chest tightness, shortness of breath and wheezing. Cardiovascular: Negative for chest pain and leg swelling. Gastrointestinal: Negative. Genitourinary: Negative. Neurological: Negative for dizziness, light-headedness and headaches. Current Outpatient Medications   Medication Sig Dispense Refill    tamsulosin (FLOMAX) 0.4 MG capsule TAKE 1 CAPSULE BY MOUTH EVERY DAY 90 capsule 1    triamterene-hydroCHLOROthiazide (MAXZIDE-25) 37.5-25 MG per tablet TAKE 1/2  TABLET BY MOUTH EVERY DAY 1 tablet 0    Handicap Placard MISC by Does not apply route 1 each 0    Omega-3 Fatty Acids (FISH OIL) 1000 MG CAPS Take 3,000 mg by mouth daily      vitamin D (CHOLECALCIFEROL) 1000 UNIT TABS tablet Take 1,000 Units by mouth      hydroxyurea (HYDREA) 500 MG chemo capsule TAKE TWO CAPSULES BY MOUTH DAILY 60 capsule 2    IRON PO Take 65 mg by mouth daily.  aspirin EC 81 MG EC tablet Take 1 tablet by mouth 2 times daily Take for DVT blood clot prophylaxis. Please avoid missing doses. 60 tablet 0     No current facility-administered medications for this visit. No results for input(s): WBC, HGB, HCT, MCV, PLT in the last 720 hours.      Lab Results   Component Value Date     04/02/2021    K 4.5 04/02/2021     04/02/2021    CO2 30 04/02/2021    BUN 15 04/02/2021    CREATININE 0.8 04/02/2021    GLUCOSE 104 04/02/2021    GLUCOSE 111 06/16/2017    CALCIUM 9.1 04/02/2021        Lab Results   Component Value Date    CHOL 159 11/29/2017     Lab Results   Component Value Date    TRIG 96 11/29/2017     Lab Results   Component

## 2021-09-30 ENCOUNTER — TELEPHONE (OUTPATIENT)
Dept: INTERNAL MEDICINE CLINIC | Age: 76
End: 2021-09-30

## 2021-09-30 NOTE — TELEPHONE ENCOUNTER
1  Should I get the booster ? [id number]       1st shot 03-        2 nd      04-12-21  Both of them Rah Duque, if I get the booster should I get Pfizer or change brand ? Six months is Mid October .     2 When should I get the Flu shot ?   Is the a time period between  Mon[place] Gurvinder shot and flu shot.      Please call back at [phone]

## 2021-09-30 NOTE — TELEPHONE ENCOUNTER
Can kushal flu vaccine any time  Yes he should get Covid booster vaccine ,6-8 months after last vaccination katya is at high risk

## 2021-10-15 ENCOUNTER — OFFICE VISIT (OUTPATIENT)
Dept: INTERNAL MEDICINE CLINIC | Age: 76
End: 2021-10-15
Payer: MEDICARE

## 2021-10-15 VITALS
HEART RATE: 91 BPM | OXYGEN SATURATION: 99 % | BODY MASS INDEX: 39.68 KG/M2 | DIASTOLIC BLOOD PRESSURE: 70 MMHG | SYSTOLIC BLOOD PRESSURE: 130 MMHG | RESPIRATION RATE: 16 BRPM | WEIGHT: 293 LBS | HEIGHT: 72 IN

## 2021-10-15 DIAGNOSIS — I10 ESSENTIAL HYPERTENSION, BENIGN: Primary | ICD-10-CM

## 2021-10-15 PROBLEM — B36.9 FUNGAL DERMATITIS: Status: RESOLVED | Noted: 2019-03-12 | Resolved: 2021-10-15

## 2021-10-15 PROCEDURE — G8484 FLU IMMUNIZE NO ADMIN: HCPCS | Performed by: INTERNAL MEDICINE

## 2021-10-15 PROCEDURE — G8427 DOCREV CUR MEDS BY ELIG CLIN: HCPCS | Performed by: INTERNAL MEDICINE

## 2021-10-15 PROCEDURE — 99213 OFFICE O/P EST LOW 20 MIN: CPT | Performed by: INTERNAL MEDICINE

## 2021-10-15 PROCEDURE — 1036F TOBACCO NON-USER: CPT | Performed by: INTERNAL MEDICINE

## 2021-10-15 PROCEDURE — G8417 CALC BMI ABV UP PARAM F/U: HCPCS | Performed by: INTERNAL MEDICINE

## 2021-10-15 PROCEDURE — 90732 PPSV23 VACC 2 YRS+ SUBQ/IM: CPT | Performed by: INTERNAL MEDICINE

## 2021-10-15 PROCEDURE — G0009 ADMIN PNEUMOCOCCAL VACCINE: HCPCS | Performed by: INTERNAL MEDICINE

## 2021-10-15 PROCEDURE — 1123F ACP DISCUSS/DSCN MKR DOCD: CPT | Performed by: INTERNAL MEDICINE

## 2021-10-15 PROCEDURE — 3017F COLORECTAL CA SCREEN DOC REV: CPT | Performed by: INTERNAL MEDICINE

## 2021-10-15 PROCEDURE — 4040F PNEUMOC VAC/ADMIN/RCVD: CPT | Performed by: INTERNAL MEDICINE

## 2021-10-15 ASSESSMENT — PATIENT HEALTH QUESTIONNAIRE - PHQ9
SUM OF ALL RESPONSES TO PHQ QUESTIONS 1-9: 0
SUM OF ALL RESPONSES TO PHQ QUESTIONS 1-9: 0
2. FEELING DOWN, DEPRESSED OR HOPELESS: 0
1. LITTLE INTEREST OR PLEASURE IN DOING THINGS: 0
SUM OF ALL RESPONSES TO PHQ QUESTIONS 1-9: 0
SUM OF ALL RESPONSES TO PHQ9 QUESTIONS 1 & 2: 0

## 2021-10-15 ASSESSMENT — ENCOUNTER SYMPTOMS
WHEEZING: 0
CHEST TIGHTNESS: 0
GASTROINTESTINAL NEGATIVE: 1
COUGH: 0
TROUBLE SWALLOWING: 0
SHORTNESS OF BREATH: 0

## 2021-10-15 NOTE — PROGRESS NOTES
Subjective:      Patient ID: Karen Ramírez is a 78 y.o. male. Chief Complaint   Patient presents with    Hypertension        HPI  Feels fien and has no new symptoms  Has no symptoms with activity  Has no cp gi or gu symptosm  Review of Systems   Constitutional: Negative for activity change, appetite change and unexpected weight change. HENT: Negative for trouble swallowing. Respiratory: Negative for cough, chest tightness, shortness of breath and wheezing. Cardiovascular: Negative for chest pain, palpitations and leg swelling. Gastrointestinal: Negative. Genitourinary: Negative. Neurological: Negative for dizziness, light-headedness and headaches. Current Outpatient Medications   Medication Sig Dispense Refill    tamsulosin (FLOMAX) 0.4 MG capsule TAKE 1 CAPSULE BY MOUTH EVERY DAY 90 capsule 1    triamterene-hydroCHLOROthiazide (MAXZIDE-25) 37.5-25 MG per tablet TAKE 1/2  TABLET BY MOUTH EVERY DAY 1 tablet 0    Omega-3 Fatty Acids (FISH OIL) 1000 MG CAPS Take 3,000 mg by mouth daily      aspirin EC 81 MG EC tablet Take 1 tablet by mouth 2 times daily Take for DVT blood clot prophylaxis. Please avoid missing doses. 60 tablet 0    vitamin D (CHOLECALCIFEROL) 1000 UNIT TABS tablet Take 1,000 Units by mouth      hydroxyurea (HYDREA) 500 MG chemo capsule TAKE TWO CAPSULES BY MOUTH DAILY 60 capsule 2    IRON PO Take 65 mg by mouth daily.  Handicap Placard MISC by Does not apply route 1 each 0     No current facility-administered medications for this visit. No results for input(s): WBC, HGB, HCT, MCV, PLT in the last 720 hours.      Lab Results   Component Value Date     04/02/2021    K 4.5 04/02/2021     04/02/2021    CO2 30 04/02/2021    BUN 15 04/02/2021    CREATININE 0.8 04/02/2021    GLUCOSE 104 04/02/2021    GLUCOSE 111 06/16/2017    CALCIUM 9.1 04/02/2021        Lab Results   Component Value Date    CHOL 159 11/29/2017     Lab Results   Component Value Date TRIG 96 11/29/2017     Lab Results   Component Value Date    HDL 43 11/29/2017     Lab Results   Component Value Date    LDLCALC 97 11/29/2017     Lab Results   Component Value Date    LABVLDL 19 11/29/2017     No results found for: CHOLHDLRATIO     Objective:   Physical Exam  Vitals and nursing note reviewed. Constitutional:       General: He is not in acute distress. Eyes:      General: No scleral icterus. Extraocular Movements: Extraocular movements intact. Conjunctiva/sclera: Conjunctivae normal.      Pupils: Pupils are equal, round, and reactive to light. Neck:      Thyroid: No thyromegaly. Vascular: No carotid bruit or JVD. Cardiovascular:      Rate and Rhythm: Normal rate and regular rhythm. Pulses: Normal pulses. Heart sounds: Normal heart sounds. No murmur heard. No gallop. Pulmonary:      Effort: No respiratory distress. Breath sounds: Normal breath sounds. No wheezing, rhonchi or rales. Musculoskeletal:      Right lower leg: No edema. Left lower leg: No edema. Lymphadenopathy:      Cervical: No cervical adenopathy. Neurological:      Mental Status: He is alert and oriented to person, place, and time.          Assessment:   HTN  WELL CONTROLLED    Not use CPAP  for years-did not like to be with machine-some times feels sleepy when he sits in chair but not when driving and gets good night sleep and occasionally snores   Plan:      Needs pneumovax booster due to him being on immunotherapy every 5 years    Continue current medications  See in 3  months    Gaby Marie MD

## 2021-11-02 ENCOUNTER — HOSPITAL ENCOUNTER (EMERGENCY)
Age: 76
Discharge: HOME | End: 2021-11-03
Attending: EMERGENCY MEDICINE
Payer: MEDICARE

## 2021-11-02 DIAGNOSIS — K62.3 PARTIAL RECTAL PROLAPSE: Primary | ICD-10-CM

## 2021-11-02 DIAGNOSIS — K62.5 BRIGHT RED BLOOD PER RECTUM: ICD-10-CM

## 2021-11-02 LAB
A/G RATIO: 1.9 (ref 1.1–2.2)
ALBUMIN SERPL-MCNC: 4.5 G/DL (ref 3.4–5)
ALP BLD-CCNC: 42 U/L (ref 40–129)
ALT SERPL-CCNC: 11 U/L (ref 10–40)
ANION GAP SERPL CALCULATED.3IONS-SCNC: 11 MMOL/L (ref 3–16)
AST SERPL-CCNC: 19 U/L (ref 15–37)
BASOPHILS ABSOLUTE: 0.1 K/UL (ref 0–0.2)
BASOPHILS RELATIVE PERCENT: 1.3 %
BILIRUB SERPL-MCNC: 0.8 MG/DL (ref 0–1)
BUN BLDV-MCNC: 17 MG/DL (ref 7–20)
CALCIUM SERPL-MCNC: 9.2 MG/DL (ref 8.3–10.6)
CHLORIDE BLD-SCNC: 104 MMOL/L (ref 99–110)
CO2: 25 MMOL/L (ref 21–32)
CREAT SERPL-MCNC: 0.8 MG/DL (ref 0.8–1.3)
EOSINOPHILS ABSOLUTE: 0.2 K/UL (ref 0–0.6)
EOSINOPHILS RELATIVE PERCENT: 4.9 %
GFR AFRICAN AMERICAN: >60
GFR NON-AFRICAN AMERICAN: >60
GLUCOSE BLD-MCNC: 88 MG/DL (ref 70–99)
HCT VFR BLD CALC: 39 % (ref 40.5–52.5)
HEMOGLOBIN: 13.5 G/DL (ref 13.5–17.5)
LYMPHOCYTES ABSOLUTE: 1 K/UL (ref 1–5.1)
LYMPHOCYTES RELATIVE PERCENT: 20.2 %
MCH RBC QN AUTO: 34.8 PG (ref 26–34)
MCHC RBC AUTO-ENTMCNC: 34.5 G/DL (ref 31–36)
MCV RBC AUTO: 100.7 FL (ref 80–100)
MONOCYTES ABSOLUTE: 0.4 K/UL (ref 0–1.3)
MONOCYTES RELATIVE PERCENT: 8.1 %
NEUTROPHILS ABSOLUTE: 3.1 K/UL (ref 1.7–7.7)
NEUTROPHILS RELATIVE PERCENT: 65.5 %
PDW BLD-RTO: 14.6 % (ref 12.4–15.4)
PLATELET # BLD: 275 K/UL (ref 135–450)
PMV BLD AUTO: 6.7 FL (ref 5–10.5)
POTASSIUM SERPL-SCNC: 4.1 MMOL/L (ref 3.5–5.1)
RBC # BLD: 3.87 M/UL (ref 4.2–5.9)
SODIUM BLD-SCNC: 140 MMOL/L (ref 136–145)
TOTAL PROTEIN: 6.9 G/DL (ref 6.4–8.2)
WBC # BLD: 4.7 K/UL (ref 4–11)

## 2021-11-02 PROCEDURE — 36415 COLL VENOUS BLD VENIPUNCTURE: CPT

## 2021-11-02 PROCEDURE — 80053 COMPREHEN METABOLIC PANEL: CPT

## 2021-11-02 PROCEDURE — 85025 COMPLETE CBC W/AUTO DIFF WBC: CPT

## 2021-11-02 PROCEDURE — 99283 EMERGENCY DEPT VISIT LOW MDM: CPT

## 2021-11-03 VITALS
DIASTOLIC BLOOD PRESSURE: 69 MMHG | TEMPERATURE: 98.4 F | RESPIRATION RATE: 19 BRPM | OXYGEN SATURATION: 96 % | SYSTOLIC BLOOD PRESSURE: 124 MMHG | HEART RATE: 75 BPM

## 2021-11-03 RX ORDER — HYDROCORTISONE ACETATE 25 MG/1
25 SUPPOSITORY RECTAL 2 TIMES DAILY
Qty: 10 SUPPOSITORY | Refills: 0 | Status: SHIPPED | OUTPATIENT
Start: 2021-11-03 | End: 2022-04-21 | Stop reason: ALTCHOICE

## 2021-11-03 RX ORDER — POLYETHYLENE GLYCOL 3350 17 G/17G
17 POWDER, FOR SOLUTION ORAL DAILY
Qty: 527 G | Refills: 0 | Status: SHIPPED | OUTPATIENT
Start: 2021-11-03 | End: 2021-11-10

## 2021-11-03 ASSESSMENT — ENCOUNTER SYMPTOMS
ABDOMINAL PAIN: 0
ANAL BLEEDING: 1
VOMITING: 0
RECTAL PAIN: 0
NAUSEA: 0
COLOR CHANGE: 0

## 2021-11-03 NOTE — ED NOTES
Patient DCed from ED. Discussed follow up, scripts, and AVS. Patient verbalized understanding. Reinforced to patient that should symptoms persist or worsen to return to ED. He verbalizes understanding. Patient ambulated out of ED on his own with a cane. VSS.       Delia Montelongo RN  11/03/21 0025

## 2021-11-03 NOTE — ED PROVIDER NOTES
I independently performed a history and physical on Chaya Aguirre. All diagnostic, treatment, and disposition decisions were made by myself in conjunction with the advanced practice provider. For further details of [facility] emergency department encounter, please see AMY Levin's documentation. Patient reports that he has had some rectal bleeding and discomfort. He has noticed this since his last colonoscopy. On exam he has multiple anal skin tags and hemorrhoids but no thrombosed hemorrhoids. There is a small amount of rectal mucosa visible that is prolapsed. No active bleeding at this time.      Hugh Caballero MD  11/02/21 9923

## 2021-11-03 NOTE — ED PROVIDER NOTES
[facility]      Pt Name: Maria Todd  MRN: [id number]  [place] 1945  Date of evaluation: 11/2/2021  Provider: AMY Golden    This patient was seen and evaluated by the attending physician Dr. Greg Livingston. CHIEF COMPLAINT       Chief Complaint   Patient presents with    Rectal Bleeding       CRITICAL CARE TIME   I performed a total Critical Care time of 15 minutes, excluding separately reportable procedures. There was a high probability of clinically significant/life threatening deterioration in the patient's condition which required my urgent intervention. Not limited to multiple reexaminations, discussions with attending physician and consultants. HISTORY OF PRESENT ILLNESS  (Location/Symptom, Timing/Onset, Context/Setting, Quality, Duration, Modifying Factors, Severity.)   Maria Todd is a 78 y.o. male who presents to the emergency department after noticing some bright red blood per rectum. He states it was after a bowel movement x2 today. He states that a couple of years ago approximately 2019 he had colonoscopy and he noticed a lump in his rectal area that he believes his bleeding currently. It had not bled since then. He states that he is not constipated no abdominal pain no vomiting no fevers he takes a baby aspirin no other blood thinners. He has history of thrombocythemia follows with Dr. Jessenia Westfall. Nursing Notes were reviewed and I agree. REVIEW OF SYSTEMS    (2-9 systems for level 4, 10 or more for level 5)     Review of Systems   Constitutional: Negative for fever. Gastrointestinal: Positive for anal bleeding. Negative for abdominal pain, nausea, rectal pain and vomiting. Skin: Negative for color change and wound. Neurological: Negative for weakness and numbness. Psychiatric/Behavioral: Negative for agitation, behavioral problems and confusion.        Except as noted above the remainder of the review of systems was reviewed and negative. PAST MEDICAL HISTORY         Diagnosis Date    Amnesia 01/78    Arthritis     Erythema     Antral    Essential thrombocythemia ([facility] [address]     HBP (high blood pressure)     Nodule     Stomach       SURGICAL HISTORY           Procedure Laterality Date    BACK SURGERY      BONE MARROW BIOPSY      CARPAL TUNNEL RELEASE      COLONOSCOPY  5/09    Polypectomy    COLONOSCOPY  1/15    normal - DR. Mckinney    JOINT REPLACEMENT Left     JOINT REPLACEMENT Right 08/20/2019    RIGHT LATERAL TOTAL HIP REPLACEMENT (Right Hip)    KNEE SURGERY  1969    Left knee    KNEE SURGERY Left     TOTAL HIP ARTHROPLASTY Right 8/20/2019    RIGHT LATERAL TOTAL HIP REPLACEMENT performed by Rodrigo Wagner MD at [address] [place] [address]       Discharge Medication List as of 11/3/2021 12:15 AM      CONTINUE these medications which have NOT CHANGED    Details   tamsulosin (FLOMAX) 0.4 MG capsule TAKE 1 CAPSULE BY MOUTH EVERY DAY, Disp-90 capsule, R-1Normal      triamterene-hydroCHLOROthiazide (MAXZIDE-25) 37.5-25 MG per tablet TAKE 1/2  TABLET BY MOUTH EVERY DAY, Disp-1 tablet, R-0Adjust Sig      Handicap Placard [facility] Starting Mon 12/16/2019, Disp-1 each, R-0, Print      Omega-3 Fatty Acids (FISH OIL) 1000 MG CAPS Take 3,000 mg by mouth dailyHistorical Med      aspirin EC 81 MG EC tablet Take 1 tablet by mouth 2 times daily Take for DVT blood clot prophylaxis. Please avoid missing doses. , Disp-60 tablet, R-0Print      vitamin D (CHOLECALCIFEROL) 1000 UNIT TABS tablet Take 1,000 Units by mouthHistorical Med      hydroxyurea (HYDREA) 500 MG chemo capsule TAKE TWO CAPSULES BY MOUTH DAILY, Disp-60 capsule, R-2Normal      IRON PO Take 65 mg by mouth daily. ALLERGIES     Patient has no known allergies.     FAMILY HISTORY           Problem Relation Age of Onset    Cancer Mother         lymphoma    Heart Failure Father     Hypertension Father     Heart Disease Father Family Status   Relation Name Status    Mother      Father          SOCIAL HISTORY      reports that he has never smoked. He has never used smokeless tobacco. He reports current alcohol use. He reports that he does not use drugs. PHYSICAL EXAM    (up to 7 for level 4, 8 or more for level 5)     ED Triage Vitals [21 2251]   BP Temp Temp Source Pulse Resp SpO2 Height Weight   110/70 98.4 °F (36.9 °C) Oral 93 16 98 % -- --       Physical Exam  Vitals and nursing note reviewed. Constitutional:       Appearance: Normal appearance. HENT:      Head: Normocephalic and atraumatic. Eyes:      Pupils: Pupils are equal, round, and reactive to light. Cardiovascular:      Rate and Rhythm: Normal rate. Pulmonary:      Effort: Pulmonary effort is normal. No respiratory distress. Abdominal:      Tenderness: There is no abdominal tenderness. There is no guarding or rebound. Genitourinary:     Rectum: No tenderness. Musculoskeletal:         General: Normal range of motion. Skin:     General: Skin is warm. Neurological:      General: No focal deficit present. Mental Status: He is alert and oriented to person, place, and time.    Psychiatric:         Mood and Affect: Mood normal.         Behavior: Behavior normal.         DIAGNOSTIC RESULTS     NONE    LABS:  Labs Reviewed   CBC WITH AUTO DIFFERENTIAL - Abnormal; Notable for the following components:       Result Value    RBC 3.87 (*)     Hematocrit 39.0 (*)     .7 (*)     MCH 34.8 (*)     All other components within normal limits    Narrative:     Performed at:  [facility]  [address] [place], [place] [address]   Phone [phone]   COMPREHENSIVE METABOLIC PANEL    Narrative:     Performed at:  [facility]  [address] [place], [place] [address]   Phone [phone]       All other labs were within normal range or not returned as of this dictation. EMERGENCY DEPARTMENT COURSE and DIFFERENTIAL DIAGNOSIS/MDM:   Vitals:    Vitals:    11/02/21 2251 11/02/21 2338 11/02/21 2351 11/03/21 0007   BP: 110/70 103/76 126/71 124/69   Pulse: 93 83 77 75   Resp: 16 16 14 19   Temp: 98.4 °F (36.9 °C)      TempSrc: Oral      SpO2: 98% 99% 97% 96%     I discussed with Dwayne Jackson and/or family the exam results, diagnosis, care, prognosis, reasons to return and the importance of follow up. Patient and/or family is in full agreement with plan and all questions have been answered. Specific discharge instructions explained, including reasons to return to the emergency department. Dwayne Jackson is well appearing, non-toxic, and afebrile at the time of discharge. Patient last saw GI in 2019. He is afebrile not tachycardic not hypotensive. He has a few spots of red blood coming from what appears to body either rectal polyp versus partial prolapse. There does not appear to be any thrombosed hemorrhoid. Abdomen is nontender. Hemoglobin is 13.5. Will discharge with MiraLAX and Anusol suppositories instructed to follow-up with GI and return for new, worsening or other concerns. I estimate there is LOW risk for ACUTE APPENDICITIS, BOWEL OBSTRUCTION, CHOLECYSTITIS, DIVERTICULITIS, INCARCERATED HERNIA, PANCREATITIS, PERFORATED BOWEL, BOWEL ISCHEMIA, GONADAL TORSION, OR CARDIAC ISCHEMIA, thus I consider the discharge disposition reasonable. Also, there is no evidence or peritonitis, sepsis, or toxicity. CONSULTS:  None    PROCEDURES:  Procedures      FINAL IMPRESSION      1. Partial rectal prolapse    2. Bright red blood per rectum          DISPOSITION/PLAN   DISPOSITION Decision To Discharge 11/03/2021 12:08:11 AM      PATIENT REFERRED TO:  Radha Ashley., DO  [address]  [address]  [place]    Call in 1 day  For follow up with GI MD Juan David Jimenez [id number]  [phone]    Call in 1 day  For follow up    Tae Patterson MD  Ari Serrano [address] [place] [address]  [phone]    Call in 1 day  For follow up with GI Dr. Contreras Dose:  Discharge Medication List as of 11/3/2021 12:15 AM      START taking these medications    Details   polyethylene glycol (MIRALAX) 17 g packet Take 17 g by mouth daily for 7 days, Disp-527 g, R-0Print      hydrocortisone (ANUCORT-HC) 25 MG suppository Place 1 suppository rectally 2 times daily, Disp-10 suppository, R-0Print             (Please note that portions of this note were completed with a voice recognition program.  Efforts were made to edit the dictations but occasionally words are mis-transcribed.)    Jessica Serrano, [address], [address] [address]  11/03/21 0110

## 2021-11-19 RX ORDER — TAMSULOSIN HYDROCHLORIDE 0.4 MG/1
CAPSULE ORAL
Qty: 90 CAPSULE | Refills: 1 | Status: SHIPPED | OUTPATIENT
Start: 2021-11-19 | End: 2022-05-23 | Stop reason: SDUPTHER

## 2021-12-21 ENCOUNTER — HOSPITAL ENCOUNTER (EMERGENCY)
Age: 76
Discharge: HOME | End: 2021-12-21
Payer: MEDICARE

## 2021-12-21 ENCOUNTER — APPOINTMENT (OUTPATIENT)
Dept: GENERAL RADIOLOGY | Age: 76
End: 2021-12-21
Payer: MEDICARE

## 2021-12-21 VITALS
OXYGEN SATURATION: 98 % | RESPIRATION RATE: 16 BRPM | TEMPERATURE: 97.4 F | BODY MASS INDEX: 39.15 KG/M2 | HEIGHT: 72 IN | SYSTOLIC BLOOD PRESSURE: 131 MMHG | WEIGHT: 289.02 LBS | HEART RATE: 81 BPM | DIASTOLIC BLOOD PRESSURE: 89 MMHG

## 2021-12-21 DIAGNOSIS — R10.32 LEFT GROIN PAIN: Primary | ICD-10-CM

## 2021-12-21 LAB
A/G RATIO: 1.7 (ref 1.1–2.2)
ALBUMIN SERPL-MCNC: 4.3 G/DL (ref 3.4–5)
ALP BLD-CCNC: 47 U/L (ref 40–129)
ALT SERPL-CCNC: 10 U/L (ref 10–40)
ANION GAP SERPL CALCULATED.3IONS-SCNC: 10 MMOL/L (ref 3–16)
AST SERPL-CCNC: 16 U/L (ref 15–37)
BASOPHILS ABSOLUTE: 0.1 K/UL (ref 0–0.2)
BASOPHILS RELATIVE PERCENT: 1.1 %
BILIRUB SERPL-MCNC: 0.8 MG/DL (ref 0–1)
BUN BLDV-MCNC: 17 MG/DL (ref 7–20)
CALCIUM SERPL-MCNC: 9 MG/DL (ref 8.3–10.6)
CHLORIDE BLD-SCNC: 104 MMOL/L (ref 99–110)
CO2: 28 MMOL/L (ref 21–32)
CREAT SERPL-MCNC: 0.8 MG/DL (ref 0.8–1.3)
EOSINOPHILS ABSOLUTE: 0.1 K/UL (ref 0–0.6)
EOSINOPHILS RELATIVE PERCENT: 2.9 %
GFR AFRICAN AMERICAN: >60
GFR NON-AFRICAN AMERICAN: >60
GLUCOSE BLD-MCNC: 106 MG/DL (ref 70–99)
HCT VFR BLD CALC: 39.6 % (ref 40.5–52.5)
HEMOGLOBIN: 13.6 G/DL (ref 13.5–17.5)
LYMPHOCYTES ABSOLUTE: 0.9 K/UL (ref 1–5.1)
LYMPHOCYTES RELATIVE PERCENT: 18.5 %
MCH RBC QN AUTO: 34.3 PG (ref 26–34)
MCHC RBC AUTO-ENTMCNC: 34.3 G/DL (ref 31–36)
MCV RBC AUTO: 100 FL (ref 80–100)
MONOCYTES ABSOLUTE: 0.4 K/UL (ref 0–1.3)
MONOCYTES RELATIVE PERCENT: 8.4 %
NEUTROPHILS ABSOLUTE: 3.4 K/UL (ref 1.7–7.7)
NEUTROPHILS RELATIVE PERCENT: 69.1 %
PDW BLD-RTO: 14.6 % (ref 12.4–15.4)
PLATELET # BLD: 288 K/UL (ref 135–450)
PMV BLD AUTO: 6.1 FL (ref 5–10.5)
POTASSIUM REFLEX MAGNESIUM: 3.9 MMOL/L (ref 3.5–5.1)
RBC # BLD: 3.96 M/UL (ref 4.2–5.9)
SEDIMENTATION RATE, ERYTHROCYTE: 11 MM/HR (ref 0–20)
SODIUM BLD-SCNC: 142 MMOL/L (ref 136–145)
TOTAL PROTEIN: 6.8 G/DL (ref 6.4–8.2)
WBC # BLD: 4.9 K/UL (ref 4–11)

## 2021-12-21 PROCEDURE — 36415 COLL VENOUS BLD VENIPUNCTURE: CPT

## 2021-12-21 PROCEDURE — 80053 COMPREHEN METABOLIC PANEL: CPT

## 2021-12-21 PROCEDURE — 99284 EMERGENCY DEPT VISIT MOD MDM: CPT

## 2021-12-21 PROCEDURE — 73502 X-RAY EXAM HIP UNI 2-3 VIEWS: CPT

## 2021-12-21 PROCEDURE — 85652 RBC SED RATE AUTOMATED: CPT

## 2021-12-21 PROCEDURE — 6370000000 HC RX 637 (ALT 250 FOR IP): Performed by: PHYSICIAN ASSISTANT

## 2021-12-21 PROCEDURE — 85025 COMPLETE CBC W/AUTO DIFF WBC: CPT

## 2021-12-21 RX ORDER — IBUPROFEN 800 MG/1
800 TABLET ORAL EVERY 8 HOURS PRN
Qty: 20 TABLET | Refills: 0 | Status: SHIPPED | OUTPATIENT
Start: 2021-12-21 | End: 2022-07-21

## 2021-12-21 RX ORDER — TRAMADOL HYDROCHLORIDE 50 MG/1
50 TABLET ORAL EVERY 4 HOURS PRN
Qty: 18 TABLET | Refills: 0 | Status: SHIPPED | OUTPATIENT
Start: 2021-12-21 | End: 2021-12-24

## 2021-12-21 RX ORDER — IBUPROFEN 400 MG/1
800 TABLET ORAL ONCE
Status: COMPLETED | OUTPATIENT
Start: 2021-12-21 | End: 2021-12-21

## 2021-12-21 RX ADMIN — IBUPROFEN 800 MG: 400 TABLET, FILM COATED ORAL at 09:36

## 2021-12-21 ASSESSMENT — PAIN DESCRIPTION - LOCATION
LOCATION: GROIN

## 2021-12-21 ASSESSMENT — PAIN SCALES - GENERAL
PAINLEVEL_OUTOF10: 9
PAINLEVEL_OUTOF10: 4
PAINLEVEL_OUTOF10: 4

## 2021-12-21 ASSESSMENT — PAIN DESCRIPTION - FREQUENCY: FREQUENCY: CONTINUOUS

## 2021-12-21 ASSESSMENT — PAIN DESCRIPTION - PROGRESSION: CLINICAL_PROGRESSION: GRADUALLY WORSENING

## 2021-12-21 ASSESSMENT — PAIN DESCRIPTION - ONSET: ONSET: GRADUAL

## 2021-12-21 ASSESSMENT — PAIN DESCRIPTION - ORIENTATION: ORIENTATION: LEFT

## 2021-12-21 ASSESSMENT — PAIN DESCRIPTION - PAIN TYPE: TYPE: ACUTE PAIN

## 2021-12-21 ASSESSMENT — PAIN - FUNCTIONAL ASSESSMENT: PAIN_FUNCTIONAL_ASSESSMENT: 0-10

## 2021-12-21 ASSESSMENT — PAIN DESCRIPTION - DESCRIPTORS: DESCRIPTORS: SHARP

## 2021-12-21 NOTE — ED NOTES
D/C: Order noted for d/c. Pt confirmed d/c paperwork and two prescriptions have correct name. Discharge and education instructions reviewed with patient. Teach-back successful. Pt verbalized understanding and signed d/c papers. Pt denied questions at this time. No acute distress noted. Patient instructed to follow-up as noted - return to emergency department if symptoms worsen. Patient verbalized understanding. Discharged per EDMD with discharge instructions. Pt discharged to private vehicle. Patient stable upon departure. Thanked patient for choosing [facility] for care. Provider aware of patient pain at time of discharge.        Flaco Song RN  12/21/21 1104

## 2021-12-21 NOTE — ED TRIAGE NOTES
Pt states that he started having groin pain last night around 1 am. Pt stated that laying down makes it better. He stated that he called his primary and they told him to come to the ED.  Pt is alert and oriented x4

## 2021-12-24 NOTE — ED PROVIDER NOTES
**ADVANCED PRACTICE PROVIDER, I HAVE EVALUATED THIS PATIENT**        [address]      Pt Name: Che Ott  [facility]:[id number]  [place] 1945  Date of evaluation: 12/21/2021  Provider: Erica Mayberry PA-C      Chief Complaint:    Chief Complaint   Patient presents with    Groin Pain     left sided groin pain started about 1 am this morning. denies injury. Nursing Notes, Past Medical Hx, Past Surgical Hx, Social Hx, Allergies, and Family Hx were all reviewed and agreed with or any disagreements were addressed in the HPI.    HPI: (Location, Duration, Timing, Severity, Quality, Assoc Sx, Context, Modifying factors)    Chief Complaint of left groin pain    This is a  78 y.o. male who presents to the emergency department complaining of pain to his left groin. He states that he woke up at 1:00 this morning with pain to his left groin, rates his pain level as 9/10. It is worse with movement and better with rest.  It is a deep aching type pain, occasionally will have sharp exacerbations. He denies any testicular pain, denies any difficulty urinating, denies any nausea vomiting diarrhea or constipation, denies any lower or upper abdominal pain. Denies any radiation of pain into his back. Initially he thought this was his hip replacement which had either slipped or malfunction, and came in asking for x-rays to that area. PastMedical/Surgical History:      Diagnosis Date    Amnesia 01/78    Arthritis     Erythema     Antral    Essential thrombocythemia ([address]     HBP (high blood pressure)     Nodule     Stomach         Procedure Laterality Date    BACK SURGERY      BONE MARROW BIOPSY      CARPAL TUNNEL RELEASE      COLONOSCOPY  5/09    Polypectomy    COLONOSCOPY  1/15    normal - DR. Mckinney    JOINT REPLACEMENT Left     JOINT REPLACEMENT Right 08/20/2019    RIGHT LATERAL TOTAL HIP REPLACEMENT (Right Hip)    KNEE SURGERY 1969    Left knee    KNEE SURGERY Left     TOTAL HIP ARTHROPLASTY Right 8/20/2019    RIGHT LATERAL TOTAL HIP REPLACEMENT performed [facility]ian [facility] [place]        Medications:  Discharge Medication List as of 12/21/2021 11:31 AM      CONTINUE these medications which have NOT CHANGED    Details   tamsulosin (FLOMAX) 0.4 MG capsule TAKE 1 CAPSULE BY MOUTH EVERY DAY, Disp-90 capsule, R-1Normal      hydrocortisone (ANUCORT-HC) 25 MG suppository Place 1 suppository rectally 2 times daily, Disp-10 suppository, R-0Print      triamterene-hydroCHLOROthiazide (MAXZIDE-25) 37.5-25 MG per tablet TAKE 1/2  TABLET BY MOUTH EVERY DAY, Disp-1 tablet, R-0Adjust Sig      Handicap Placard MISC Starting Mon 12/16/2019, Disp-1 each, R-0, Print      Omega-3 Fatty Acids (FISH OIL) 1000 MG CAPS Take 3,000 mg by mouth dailyHistorical Med      aspirin EC 81 MG EC tablet Take 1 tablet by mouth 2 times daily Take for DVT blood clot prophylaxis. Please avoid missing doses. , Disp-60 tablet, R-0Print      vitamin D (CHOLECALCIFEROL) 1000 UNIT TABS tablet Take 1,000 Units by mouthHistorical Med      hydroxyurea (HYDREA) 500 MG chemo capsule TAKE TWO CAPSULES BY MOUTH DAILY, Disp-60 capsule, R-2Normal      IRON PO Take 65 mg by mouth daily. Review of Systems:  (2-9 systems needed)  Review of Systems    \"Positives and Pertinent negatives as per HPI\"    Physical Exam:  Physical Exam  Vitals and nursing note reviewed. Constitutional:       Appearance: Normal appearance. He is well-developed. He is not ill-appearing or diaphoretic. HENT:      Head: Normocephalic and atraumatic. Right Ear: External ear normal.      Left Ear: External ear normal.      Nose: Nose normal.   Eyes:      General:         Right eye: No discharge. Left eye: No discharge. Pulmonary:      Effort: Pulmonary effort is normal. No respiratory distress. Breath sounds: No stridor.    Musculoskeletal:         General: Normal range of motion. Cervical back: Normal range of motion and neck supple. Left hip: Tenderness present. No deformity, bony tenderness or crepitus. Normal range of motion. Normal strength. Legs:    Skin:     General: Skin is warm and dry. Coloration: Skin is not pale. Neurological:      General: No focal deficit present. Mental Status: He is alert and oriented to person, place, and time. Psychiatric:         Mood and Affect: Mood normal.         Behavior: Behavior normal.         MEDICAL DECISION MAKING    Vitals:    Vitals:    12/21/21 0858 12/21/21 1147   BP: (!) 173/103 131/89   Pulse: 90 81   Resp: 17 16   Temp: 97.4 °F (36.3 °C)    TempSrc: Temporal    SpO2: 98% 98%   Weight: 289 lb 0.4 oz (131.1 kg)    Height: 6' (1.829 m)        LABS:  Labs Reviewed   CBC WITH AUTO DIFFERENTIAL - Abnormal; Notable for the following components:       Result Value    RBC 3.96 (*)     Hematocrit 39.6 (*)     MCH 34.3 (*)     Lymphocytes Absolute 0.9 (*)     All other components within normal limits    Narrative:     Performed at:  [address] [address]   Phone [phone]   COMPREHENSIVE METABOLIC PANEL W/ REFLEX TO MG FOR LOW K - Abnormal; Notable for the following components:    Glucose 106 (*)     All other components within normal limits    Narrative:     Performed at:  [address] [address]   Phone [phone]   SEDIMENTATION RATE    Narrative:     Performed at:  [address] [address]   Phone [phone] labs reviewed and were negative at this time or not returned at the time of this note.     RADIOLOGY:   Non-plain film images such as CT, Ultrasound and MRI are read by the radiologist. Darian Swartz PA-C have directly visualized the radiologic plain film image(s) with the below findings:      Interpretation per the Radiologist below, if available at the time of this note:    XR HIP LEFT (2-3 VIEWS)   Final Result   No acute osseous abnormality. XR HIP LEFT (2-3 VIEWS)    Result Date: 12/21/2021  EXAMINATION: TWO XRAY VIEWS OF THE LEFT HIP 12/21/2021 9:44 am COMPARISON: None. HISTORY: ORDERING SYSTEM PROVIDED HISTORY: Injury TECHNOLOGIST PROVIDED HISTORY: Reason for exam:->Injury Reason for Exam: groin pain FINDINGS: There is no evidence of fracture, malalignment, or other acute osseous abnormality. Patient is status post total hip arthroplasty, hardware appears intact. No acute osseous abnormality. MEDICAL DECISION MAKING / ED COURSE:      PROCEDURES:   Procedures    None    Patient was given:  Medications   ibuprofen (ADVIL;MOTRIN) tablet 800 mg (800 mg Oral Given 12/21/21 0936)       Patient had no swelling or redness or tenderness to his periscrotal area perirectal area or perineum to suggest infectious etiology at this time. Laboratory work-up is unremarkable, do believe this is more musculoskeletal.  Patient was very concerned about infection. The patient tolerated their visit well. I evaluated the patient. The physician was available for consultation as needed. The patient and / or the family were informed of the results of any tests, a time was given to answer questions, a plan was proposed and they agreed with plan. CLINICAL IMPRESSION:  1.  Left groin pain        DISPOSITION Decision To Discharge 12/21/2021 11:09:30 AM      PATIENT REFERRED TO:  Joya Gallardo, [facility] [place] [address] [id number]    Call today  For a recheck in 1-5 days      DISCHARGE MEDICATIONS:  Discharge Medication List as of 12/21/2021 11:31 AM      START taking these medications    Details   ibuprofen (IBU) 800 MG tablet Take 1 tablet by mouth every 8 hours as needed for Pain, Disp-20 tablet, R-0Normal      traMADol (ULTRAM) 50 MG tablet Take 1 tablet by mouth every 4 hours as needed for Pain for up to 3 days. Intended supply: 3 days.  Take lowest dose possible to manage pain, Disp-18 tablet, R-0Normal             DISCONTINUED MEDICATIONS:  Discharge Medication List as of 12/21/2021 11:31 AM                 (Please note the MDM and HPI sections of this note were completed with a voice recognition program.  Efforts were made to edit the dictations but occasionally words are mis-transcribed.)    Electronically signed, Philip Ibarra PA-C,          Philip Ibarra PA-C  12/24/21 3686 No

## 2022-01-21 ENCOUNTER — OFFICE VISIT (OUTPATIENT)
Dept: INTERNAL MEDICINE CLINIC | Age: 77
End: 2022-01-21
Payer: MEDICARE

## 2022-01-21 VITALS
HEART RATE: 91 BPM | WEIGHT: 290.8 LBS | DIASTOLIC BLOOD PRESSURE: 76 MMHG | HEIGHT: 72 IN | OXYGEN SATURATION: 97 % | BODY MASS INDEX: 39.39 KG/M2 | TEMPERATURE: 96.8 F | SYSTOLIC BLOOD PRESSURE: 130 MMHG | RESPIRATION RATE: 16 BRPM

## 2022-01-21 DIAGNOSIS — D47.3 ESSENTIAL THROMBOCYTHEMIA (HCC): ICD-10-CM

## 2022-01-21 DIAGNOSIS — I10 ESSENTIAL HYPERTENSION, BENIGN: Primary | ICD-10-CM

## 2022-01-21 PROCEDURE — G8484 FLU IMMUNIZE NO ADMIN: HCPCS | Performed by: INTERNAL MEDICINE

## 2022-01-21 PROCEDURE — 1123F ACP DISCUSS/DSCN MKR DOCD: CPT | Performed by: INTERNAL MEDICINE

## 2022-01-21 PROCEDURE — G8427 DOCREV CUR MEDS BY ELIG CLIN: HCPCS | Performed by: INTERNAL MEDICINE

## 2022-01-21 PROCEDURE — G8417 CALC BMI ABV UP PARAM F/U: HCPCS | Performed by: INTERNAL MEDICINE

## 2022-01-21 PROCEDURE — 4040F PNEUMOC VAC/ADMIN/RCVD: CPT | Performed by: INTERNAL MEDICINE

## 2022-01-21 PROCEDURE — 1036F TOBACCO NON-USER: CPT | Performed by: INTERNAL MEDICINE

## 2022-01-21 PROCEDURE — 3017F COLORECTAL CA SCREEN DOC REV: CPT | Performed by: INTERNAL MEDICINE

## 2022-01-21 PROCEDURE — 99213 OFFICE O/P EST LOW 20 MIN: CPT | Performed by: INTERNAL MEDICINE

## 2022-01-21 ASSESSMENT — ENCOUNTER SYMPTOMS
CHEST TIGHTNESS: 0
COUGH: 0
GASTROINTESTINAL NEGATIVE: 1
WHEEZING: 0
SHORTNESS OF BREATH: 0

## 2022-01-21 NOTE — PROGRESS NOTES
Subjective:      Patient ID: Cristo Navarro is a 78 y.o. male. Chief Complaint   Patient presents with    Hypertension        HPI  Ira Safe s no symptoms but for his aches and pains in his joints  Has no cp gi or gu symptoms  Not used CPAP in years as he could not tolearte and has occational sleepiness during day  Review of Systems   Constitutional: Negative for activity change, appetite change and unexpected weight change. Respiratory: Negative for cough, chest tightness, shortness of breath and wheezing. Cardiovascular: Negative for chest pain, palpitations and leg swelling. Gastrointestinal: Negative. Genitourinary: Negative. Neurological: Negative for dizziness, light-headedness and headaches. Current Outpatient Medications   Medication Sig Dispense Refill    ibuprofen (IBU) 800 MG tablet Take 1 tablet by mouth every 8 hours as needed for Pain 20 tablet 0    tamsulosin (FLOMAX) 0.4 MG capsule TAKE 1 CAPSULE BY MOUTH EVERY DAY 90 capsule 1    triamterene-hydroCHLOROthiazide (MAXZIDE-25) 37.5-25 MG per tablet TAKE 1/2  TABLET BY MOUTH EVERY DAY 1 tablet 0    Handicap Placard MISC by Does not apply route 1 each 0    Omega-3 Fatty Acids (FISH OIL) 1000 MG CAPS Take 3,000 mg by mouth daily      aspirin EC 81 MG EC tablet Take 1 tablet by mouth 2 times daily Take for DVT blood clot prophylaxis. Please avoid missing doses. 60 tablet 0    vitamin D (CHOLECALCIFEROL) 1000 UNIT TABS tablet Take 1,000 Units by mouth      hydroxyurea (HYDREA) 500 MG chemo capsule TAKE TWO CAPSULES BY MOUTH DAILY 60 capsule 2    IRON PO Take 65 mg by mouth daily.  hydrocortisone (ANUCORT-HC) 25 MG suppository Place 1 suppository rectally 2 times daily (Patient not taking: Reported on 1/21/2022) 10 suppository 0     No current facility-administered medications for this visit. No results for input(s): WBC, HGB, HCT, MCV, PLT in the last 720 hours.      Lab Results   Component Value Date     12/21/2021 K 3.9 12/21/2021     12/21/2021    CO2 28 12/21/2021    BUN 17 12/21/2021    CREATININE 0.8 12/21/2021    GLUCOSE 106 12/21/2021    GLUCOSE 111 06/16/2017    CALCIUM 9.0 12/21/2021        Lab Results   Component Value Date    CHOL 159 11/29/2017     Lab Results   Component Value Date    TRIG 96 11/29/2017     Lab Results   Component Value Date    HDL 43 11/29/2017     Lab Results   Component Value Date    LDLCALC 97 11/29/2017     Lab Results   Component Value Date    LABVLDL 19 11/29/2017     No results found for: CHOLHDLRATIO     Objective:   Physical Exam  Vitals and nursing note reviewed. Constitutional:       General: He is not in acute distress. Eyes:      General: No scleral icterus. Extraocular Movements: Extraocular movements intact. Conjunctiva/sclera: Conjunctivae normal.      Pupils: Pupils are equal, round, and reactive to light. Neck:      Thyroid: No thyromegaly. Vascular: No carotid bruit or JVD. Cardiovascular:      Rate and Rhythm: Normal rate and regular rhythm. Pulses: Normal pulses. Heart sounds: Normal heart sounds. No murmur heard. No gallop. Pulmonary:      Effort: No respiratory distress. Breath sounds: Normal breath sounds. No wheezing, rhonchi or rales. Musculoskeletal:      Right lower leg: No edema (1+ edema of both legs). Left lower leg: No edema. Lymphadenopathy:      Cervical: No cervical adenopathy. Neurological:      Mental Status: He is alert and oriented to person, place, and time.       Comments: Has left foot weakness after back surgery [id number]         Assessment:    HTN well controlled  Essential thrombocytosis -stable and followed by hematology      Plan:      Colonoscopy  in 2024    Continue current medications  See in 3  months    Vicky Gomez MD

## 2022-04-21 ENCOUNTER — OFFICE VISIT (OUTPATIENT)
Dept: INTERNAL MEDICINE CLINIC | Age: 77
End: 2022-04-21
Payer: MEDICARE

## 2022-04-21 VITALS
WEIGHT: 279 LBS | HEART RATE: 86 BPM | BODY MASS INDEX: 37.84 KG/M2 | DIASTOLIC BLOOD PRESSURE: 84 MMHG | RESPIRATION RATE: 16 BRPM | SYSTOLIC BLOOD PRESSURE: 126 MMHG | OXYGEN SATURATION: 98 % | TEMPERATURE: 97.7 F

## 2022-04-21 DIAGNOSIS — N40.1 BENIGN PROSTATIC HYPERPLASIA WITH LOWER URINARY TRACT SYMPTOMS, SYMPTOM DETAILS UNSPECIFIED: ICD-10-CM

## 2022-04-21 DIAGNOSIS — I10 ESSENTIAL HYPERTENSION: Primary | ICD-10-CM

## 2022-04-21 PROCEDURE — G8427 DOCREV CUR MEDS BY ELIG CLIN: HCPCS | Performed by: INTERNAL MEDICINE

## 2022-04-21 PROCEDURE — 1036F TOBACCO NON-USER: CPT | Performed by: INTERNAL MEDICINE

## 2022-04-21 PROCEDURE — G8417 CALC BMI ABV UP PARAM F/U: HCPCS | Performed by: INTERNAL MEDICINE

## 2022-04-21 PROCEDURE — 1123F ACP DISCUSS/DSCN MKR DOCD: CPT | Performed by: INTERNAL MEDICINE

## 2022-04-21 PROCEDURE — 3017F COLORECTAL CA SCREEN DOC REV: CPT | Performed by: INTERNAL MEDICINE

## 2022-04-21 PROCEDURE — 99214 OFFICE O/P EST MOD 30 MIN: CPT | Performed by: INTERNAL MEDICINE

## 2022-04-21 PROCEDURE — 4040F PNEUMOC VAC/ADMIN/RCVD: CPT | Performed by: INTERNAL MEDICINE

## 2022-04-21 SDOH — ECONOMIC STABILITY: FOOD INSECURITY: WITHIN THE PAST 12 MONTHS, THE FOOD YOU BOUGHT JUST DIDN'T LAST AND YOU DIDN'T HAVE MONEY TO GET MORE.: NEVER TRUE

## 2022-04-21 SDOH — ECONOMIC STABILITY: FOOD INSECURITY: WITHIN THE PAST 12 MONTHS, YOU WORRIED THAT YOUR FOOD WOULD RUN OUT BEFORE YOU GOT MONEY TO BUY MORE.: NEVER TRUE

## 2022-04-21 ASSESSMENT — ENCOUNTER SYMPTOMS
ABDOMINAL PAIN: 0
BLOOD IN STOOL: 0
VOMITING: 0
SHORTNESS OF BREATH: 0
COUGH: 0
SORE THROAT: 0
NAUSEA: 0

## 2022-04-21 ASSESSMENT — PATIENT HEALTH QUESTIONNAIRE - PHQ9
SUM OF ALL RESPONSES TO PHQ QUESTIONS 1-9: 0
SUM OF ALL RESPONSES TO PHQ9 QUESTIONS 1 & 2: 0
SUM OF ALL RESPONSES TO PHQ QUESTIONS 1-9: 0
SUM OF ALL RESPONSES TO PHQ QUESTIONS 1-9: 0
2. FEELING DOWN, DEPRESSED OR HOPELESS: 0
1. LITTLE INTEREST OR PLEASURE IN DOING THINGS: 0
SUM OF ALL RESPONSES TO PHQ QUESTIONS 1-9: 0

## 2022-04-21 ASSESSMENT — SOCIAL DETERMINANTS OF HEALTH (SDOH): HOW HARD IS IT FOR YOU TO PAY FOR THE VERY BASICS LIKE FOOD, HOUSING, MEDICAL CARE, AND HEATING?: NOT HARD AT ALL

## 2022-04-21 NOTE — PROGRESS NOTES
Alverto Powell (:  1945) is a 78 y.o. male, New patient, here for evaluation of the following chief complaint(s):  Established New Doctor (former Dr Jl Finch patient) and Hypertension         ASSESSMENT/PLAN:  1. Essential hypertension  - Stable   - Continue current dose of triamterene hydrochlorothiazide  2. Benign prostatic hyperplasia with lower urinary tract symptoms, symptom details unspecified  - Stable   - Continue current dose of tamsulosin    Return in about 3 months (around 2022) for Hypertension, Medicare annual wellness visit in 7 days. Subjective   SUBJECTIVE/OBJECTIVE:  BPH:  Urinary symptoms controlled with tamsulosin. Hypertension  This is a chronic problem. The current episode started more than 1 year ago. The problem is controlled. Pertinent negatives include no chest pain, palpitations or shortness of breath. Risk factors for coronary artery disease include male gender and obesity. Past treatments include diuretics. The current treatment provides significant improvement. There are no compliance problems. Review of Systems   Constitutional: Negative for fatigue and fever. HENT: Negative for nosebleeds and sore throat. Respiratory: Negative for cough and shortness of breath. Cardiovascular: Negative for chest pain, palpitations and leg swelling. Gastrointestinal: Negative for abdominal pain, blood in stool, nausea and vomiting. Neurological: Negative for dizziness and weakness. Objective   Physical Exam  Constitutional:       Appearance: Normal appearance. HENT:      Head: Normocephalic and atraumatic. Eyes:      General: No scleral icterus. Conjunctiva/sclera: Conjunctivae normal.   Cardiovascular:      Rate and Rhythm: Normal rate and regular rhythm. Pulses: Normal pulses. Heart sounds: Normal heart sounds. Pulmonary:      Effort: Pulmonary effort is normal.      Breath sounds: Normal breath sounds.    Musculoskeletal: General: No swelling. Skin:     General: Skin is warm and dry. Neurological:      Mental Status: He is alert and oriented to person, place, and time. Mental status is at baseline. Psychiatric:         Mood and Affect: Mood normal.         Behavior: Behavior normal.              An electronic signature was used to authenticate this note.     --Titus Aguilar MD

## 2022-05-23 RX ORDER — TAMSULOSIN HYDROCHLORIDE 0.4 MG/1
CAPSULE ORAL
Qty: 90 CAPSULE | Refills: 0 | Status: SHIPPED | OUTPATIENT
Start: 2022-05-23 | End: 2022-08-19

## 2022-05-23 RX ORDER — TRIAMTERENE AND HYDROCHLOROTHIAZIDE 37.5; 25 MG/1; MG/1
TABLET ORAL
Qty: 90 TABLET | Refills: 0 | Status: SHIPPED | OUTPATIENT
Start: 2022-05-23

## 2022-07-07 ENCOUNTER — OFFICE VISIT (OUTPATIENT)
Dept: INTERNAL MEDICINE CLINIC | Age: 77
End: 2022-07-07
Payer: MEDICARE

## 2022-07-07 DIAGNOSIS — Z00.00 MEDICARE ANNUAL WELLNESS VISIT, SUBSEQUENT: Primary | ICD-10-CM

## 2022-07-07 PROCEDURE — 3017F COLORECTAL CA SCREEN DOC REV: CPT | Performed by: INTERNAL MEDICINE

## 2022-07-07 PROCEDURE — 1123F ACP DISCUSS/DSCN MKR DOCD: CPT | Performed by: INTERNAL MEDICINE

## 2022-07-07 PROCEDURE — G0439 PPPS, SUBSEQ VISIT: HCPCS | Performed by: INTERNAL MEDICINE

## 2022-07-07 ASSESSMENT — PATIENT HEALTH QUESTIONNAIRE - PHQ9
SUM OF ALL RESPONSES TO PHQ QUESTIONS 1-9: 0
SUM OF ALL RESPONSES TO PHQ QUESTIONS 1-9: 0
SUM OF ALL RESPONSES TO PHQ9 QUESTIONS 1 & 2: 0
1. LITTLE INTEREST OR PLEASURE IN DOING THINGS: 0
SUM OF ALL RESPONSES TO PHQ QUESTIONS 1-9: 0
SUM OF ALL RESPONSES TO PHQ QUESTIONS 1-9: 0
2. FEELING DOWN, DEPRESSED OR HOPELESS: 0

## 2022-07-07 ASSESSMENT — LIFESTYLE VARIABLES: HOW OFTEN DO YOU HAVE A DRINK CONTAINING ALCOHOL: NEVER

## 2022-07-07 NOTE — PROGRESS NOTES
Medicare Annual Wellness Visit    Timothy Bartlett is here for Medicare AWV    Assessment & Plan   Medicare annual wellness visit, subsequent      Recommendations for Preventive Services Due: see orders and patient instructions/AVS.  Recommended screening schedule for the next 5-10 years is provided to the patient in written form: see Patient Instructions/AVS.     Return for Medicare Annual Wellness Visit in 1 year. Subjective       Patient's complete Health Risk Assessment and screening values have been reviewed and are found in Flowsheets. The following problems were reviewed today and where indicated follow up appointments were made and/or referrals ordered. Positive Risk Factor Screenings with Interventions:              Health Habits/Nutrition:     Physical Activity: Sufficiently Active    Days of Exercise per Week: 7 days    Minutes of Exercise per Session: 60 min     Have you lost any weight without trying in the past 3 months?: No     Have you seen the dentist within the past year?: (!) No    Health Habits/Nutrition Interventions:  · Dental exam overdue:  patient encouraged to make appointment with his/her dentist    Hearing/Vision:  Do you or your family notice any trouble with your hearing that hasn't been managed with hearing aids?: No  Do you have difficulty driving, watching TV, or doing any of your daily activities because of your eyesight?: No  Have you had an eye exam within the past year?: (!) No  No exam data present    Hearing/Vision Interventions:  · Vision concerns:  patient encouraged to make appointment with his/her eye specialist            Objective   There were no vitals filed for this visit. There is no height or weight on file to calculate BMI. No Known Allergies  Prior to Visit Medications    Medication Sig Taking?  Authorizing Provider   tamsulosin (FLOMAX) 0.4 MG capsule TAKE 1 CAPSULE BY MOUTH EVERY DAY Yes Braeden White MD triamterene-hydroCHLOROthiazide (MAXZIDE-25) 37.5-25 MG per tablet TAKE 1/2  TABLET BY MOUTH EVERY DAY Yes Иван Woods MD   Handicap Placard MISC by Does not apply route Yes Karlos Da Silva MD   Omega-3 Fatty Acids (FISH OIL) 1000 MG CAPS Take 3,000 mg by mouth daily Yes Historical Provider, MD   vitamin D (CHOLECALCIFEROL) 1000 UNIT TABS tablet Take 1,000 Units by mouth Yes Historical Provider, MD   hydroxyurea (HYDREA) 500 MG chemo capsule TAKE TWO CAPSULES BY MOUTH DAILY Yes Stephanie Randhawa MD   IRON PO Take 65 mg by mouth daily. Yes Historical Provider, MD   ibuprofen (IBU) 800 MG tablet Take 1 tablet by mouth every 8 hours as needed for Pain  Philip Ibarra PA-C   aspirin EC 81 MG EC tablet Take 1 tablet by mouth 2 times daily Take for DVT blood clot prophylaxis. Please avoid missing doses.   Anyi Spann, APRN - CNP       [facility] (Including outside providers/suppliers regularly involved in providing care):   Patient Care Team:  Mauro Nichole MD as PCP - General (Internal Medicine)  Stephanie Randhawa MD as PCP - Hematology/Oncology (Hematology and Oncology)  Mauro Nichole MD as PCP - [facility] Empaneled Provider  Cadence Saldana as  (Hematology and Oncology)     Reviewed and updated this visit:  Tobacco  Allergies  Meds  Problems  Med Hx  Surg Hx  Soc Hx  Fam Hx

## 2022-07-07 NOTE — PATIENT INSTRUCTIONS
Personalized Preventive Plan for Susie Cantu - 7/7/2022  Medicare offers a range of preventive health benefits. Some of the tests and screenings are paid in full while other may be subject to a deductible, co-insurance, and/or copay. Some of these benefits include a comprehensive review of your medical history including lifestyle, illnesses that may run in your family, and various assessments and screenings as appropriate. After reviewing your medical record and screening and assessments performed today your provider may have ordered immunizations, labs, imaging, and/or referrals for you. A list of these orders (if applicable) as well as your Preventive Care list are included within your After Visit Summary for your review. Other Preventive Recommendations:    · A preventive eye exam performed by an eye specialist is recommended every 1-2 years to screen for glaucoma; cataracts, macular degeneration, and other eye disorders. · A preventive dental visit is recommended every 6 months. · Try to get at least 150 minutes of exercise per week or 10,000 steps per day on a pedometer . · Order or download the FREE \"Exercise & Physical Activity: Your Everyday Guide\" from [employer]. Call 8-830.781.9335 or search [employer] online. · You need 7497-8403 mg of calcium and 4109-5245 IU of vitamin D per day. It is possible to meet your calcium requirement with diet alone, but a vitamin D supplement is usually necessary to meet this goal.  · When exposed to the sun, use a sunscreen that protects against both UVA and UVB radiation with an SPF of 30 or greater. Reapply every 2 to 3 hours or after sweating, drying off with a towel, or swimming. · Always wear a seat belt when traveling in a car. Always wear a helmet when riding a bicycle or motorcycle.

## 2022-07-21 ENCOUNTER — OFFICE VISIT (OUTPATIENT)
Dept: INTERNAL MEDICINE CLINIC | Age: 77
End: 2022-07-21
Payer: MEDICARE

## 2022-07-21 VITALS
BODY MASS INDEX: 38.25 KG/M2 | TEMPERATURE: 97.3 F | HEART RATE: 87 BPM | RESPIRATION RATE: 16 BRPM | WEIGHT: 282.4 LBS | OXYGEN SATURATION: 97 % | DIASTOLIC BLOOD PRESSURE: 84 MMHG | HEIGHT: 72 IN | SYSTOLIC BLOOD PRESSURE: 120 MMHG

## 2022-07-21 DIAGNOSIS — N40.1 BENIGN PROSTATIC HYPERPLASIA WITH LOWER URINARY TRACT SYMPTOMS, SYMPTOM DETAILS UNSPECIFIED: ICD-10-CM

## 2022-07-21 DIAGNOSIS — I10 ESSENTIAL HYPERTENSION: Primary | ICD-10-CM

## 2022-07-21 DIAGNOSIS — E66.01 SEVERE OBESITY (BMI 35.0-39.9) WITH COMORBIDITY (HCC): ICD-10-CM

## 2022-07-21 DIAGNOSIS — R73.09 INCREASED BLOOD GLUCOSE: ICD-10-CM

## 2022-07-21 LAB — HBA1C MFR BLD: 4.3 %

## 2022-07-21 PROCEDURE — G8417 CALC BMI ABV UP PARAM F/U: HCPCS | Performed by: INTERNAL MEDICINE

## 2022-07-21 PROCEDURE — 3017F COLORECTAL CA SCREEN DOC REV: CPT | Performed by: INTERNAL MEDICINE

## 2022-07-21 PROCEDURE — 1036F TOBACCO NON-USER: CPT | Performed by: INTERNAL MEDICINE

## 2022-07-21 PROCEDURE — G8427 DOCREV CUR MEDS BY ELIG CLIN: HCPCS | Performed by: INTERNAL MEDICINE

## 2022-07-21 PROCEDURE — 83036 HEMOGLOBIN GLYCOSYLATED A1C: CPT | Performed by: INTERNAL MEDICINE

## 2022-07-21 PROCEDURE — 1123F ACP DISCUSS/DSCN MKR DOCD: CPT | Performed by: INTERNAL MEDICINE

## 2022-07-21 PROCEDURE — 99214 OFFICE O/P EST MOD 30 MIN: CPT | Performed by: INTERNAL MEDICINE

## 2022-07-21 ASSESSMENT — ENCOUNTER SYMPTOMS
SHORTNESS OF BREATH: 0
NAUSEA: 0
BLOOD IN STOOL: 0
COUGH: 0
VOMITING: 0
SORE THROAT: 0
ABDOMINAL PAIN: 0

## 2022-07-21 ASSESSMENT — PATIENT HEALTH QUESTIONNAIRE - PHQ9
1. LITTLE INTEREST OR PLEASURE IN DOING THINGS: 0
SUM OF ALL RESPONSES TO PHQ QUESTIONS 1-9: 0
SUM OF ALL RESPONSES TO PHQ9 QUESTIONS 1 & 2: 0
SUM OF ALL RESPONSES TO PHQ QUESTIONS 1-9: 0
2. FEELING DOWN, DEPRESSED OR HOPELESS: 0
SUM OF ALL RESPONSES TO PHQ QUESTIONS 1-9: 0
SUM OF ALL RESPONSES TO PHQ QUESTIONS 1-9: 0

## 2022-07-21 NOTE — PROGRESS NOTES
Maria Todd (:  1945) is a 78 y.o. male, New patient, here for evaluation of the following chief complaint(s):  3 Month Follow-Up and Hypertension         ASSESSMENT/PLAN:  1. Essential hypertension  - Stable   - Continue current dose of triamterene hydrochlorothiazide  2. Benign prostatic hyperplasia with lower urinary tract symptoms, symptom details unspecified  - Stable   - Continue current dose of tamsulosin  3. Severe obesity (BMI 35.0-39. 9) with comorbidity ([address]  Recommended to follow lifestyle modifications with low-calorie diet and regular exercise. 4. Increased blood glucose  Check hemoglobin A1c today. Return in about 3 months (around 10/21/2022). Subjective   SUBJECTIVE/OBJECTIVE:  BPH:  Urinary symptoms controlled with tamsulosin. Hypertension  This is a chronic problem. The current episode started more than 1 year ago. The problem is controlled. Pertinent negatives include no chest pain, palpitations or shortness of breath. Risk factors for coronary artery disease include male gender and obesity. Past treatments include diuretics. The current treatment provides significant improvement. There are no compliance problems. Review of Systems   Constitutional:  Negative for fatigue and fever. HENT:  Negative for nosebleeds and sore throat. Respiratory:  Negative for cough and shortness of breath. Cardiovascular:  Negative for chest pain, palpitations and leg swelling. Gastrointestinal:  Negative for abdominal pain, blood in stool, nausea and vomiting. Neurological:  Negative for dizziness and weakness. Objective   Physical Exam  Constitutional:       Appearance: Normal appearance. HENT:      Head: Normocephalic and atraumatic. Eyes:      General: No scleral icterus. Conjunctiva/sclera: Conjunctivae normal.   Cardiovascular:      Rate and Rhythm: Normal rate and regular rhythm. Pulses: Normal pulses. Heart sounds: Normal heart sounds. Pulmonary:      Effort: Pulmonary effort is normal.      Breath sounds: Normal breath sounds. Musculoskeletal:         General: No swelling. Skin:     General: Skin is warm and dry. Neurological:      Mental Status: He is alert and oriented to person, place, and time. Mental status is at baseline. Psychiatric:         Mood and Affect: Mood normal.         Behavior: Behavior normal.            An electronic signature was used to authenticate this note.     --Ulises Luna MD

## 2022-08-19 RX ORDER — TAMSULOSIN HYDROCHLORIDE 0.4 MG/1
CAPSULE ORAL
Qty: 90 CAPSULE | Refills: 1 | Status: SHIPPED | OUTPATIENT
Start: 2022-08-19

## 2022-08-19 NOTE — TELEPHONE ENCOUNTER
Last office visit 7/21/22      Future Appointments   Date Time Provider Gina Irizarry   10/25/2022  9:00 AM Mauro Nichole MD [facility]

## 2022-10-25 ENCOUNTER — OFFICE VISIT (OUTPATIENT)
Dept: INTERNAL MEDICINE CLINIC | Age: 77
End: 2022-10-25
Payer: MEDICARE

## 2022-10-25 VITALS
OXYGEN SATURATION: 98 % | DIASTOLIC BLOOD PRESSURE: 88 MMHG | SYSTOLIC BLOOD PRESSURE: 134 MMHG | BODY MASS INDEX: 38.25 KG/M2 | TEMPERATURE: 98.5 F | WEIGHT: 282 LBS

## 2022-10-25 DIAGNOSIS — D75.89 MACROCYTOSIS: ICD-10-CM

## 2022-10-25 DIAGNOSIS — N40.1 BENIGN PROSTATIC HYPERPLASIA WITH LOWER URINARY TRACT SYMPTOMS, SYMPTOM DETAILS UNSPECIFIED: ICD-10-CM

## 2022-10-25 DIAGNOSIS — I10 ESSENTIAL HYPERTENSION: Primary | ICD-10-CM

## 2022-10-25 DIAGNOSIS — E66.01 SEVERE OBESITY (BMI 35.0-39.9) WITH COMORBIDITY (HCC): ICD-10-CM

## 2022-10-25 DIAGNOSIS — Z12.5 SCREENING PSA (PROSTATE SPECIFIC ANTIGEN): ICD-10-CM

## 2022-10-25 DIAGNOSIS — D75.89 MACROCYTOSIS: Primary | ICD-10-CM

## 2022-10-25 LAB
A/G RATIO: 2.6 (ref 1.1–2.2)
ALBUMIN SERPL-MCNC: 4.6 G/DL (ref 3.4–5)
ALP BLD-CCNC: 43 U/L (ref 40–129)
ALT SERPL-CCNC: 10 U/L (ref 10–40)
ANION GAP SERPL CALCULATED.3IONS-SCNC: 10 MMOL/L (ref 3–16)
AST SERPL-CCNC: 14 U/L (ref 15–37)
BASOPHILS ABSOLUTE: 0.1 K/UL (ref 0–0.2)
BASOPHILS RELATIVE PERCENT: 1.2 %
BILIRUB SERPL-MCNC: 0.8 MG/DL (ref 0–1)
BUN BLDV-MCNC: 13 MG/DL (ref 7–20)
CALCIUM SERPL-MCNC: 9.1 MG/DL (ref 8.3–10.6)
CHLORIDE BLD-SCNC: 102 MMOL/L (ref 99–110)
CHOLESTEROL, TOTAL: 133 MG/DL (ref 0–199)
CO2: 27 MMOL/L (ref 21–32)
CREAT SERPL-MCNC: 0.7 MG/DL (ref 0.8–1.3)
EOSINOPHILS ABSOLUTE: 0.1 K/UL (ref 0–0.6)
EOSINOPHILS RELATIVE PERCENT: 1.8 %
FOLATE: 13.88 NG/ML (ref 4.78–24.2)
GFR SERPL CREATININE-BSD FRML MDRD: >60 ML/MIN/{1.73_M2}
GLUCOSE BLD-MCNC: 95 MG/DL (ref 70–99)
HCT VFR BLD CALC: 38.4 % (ref 40.5–52.5)
HDLC SERPL-MCNC: 44 MG/DL (ref 40–60)
HEMOGLOBIN: 13.5 G/DL (ref 13.5–17.5)
LDL CHOLESTEROL CALCULATED: 76 MG/DL
LYMPHOCYTES ABSOLUTE: 1 K/UL (ref 1–5.1)
LYMPHOCYTES RELATIVE PERCENT: 19.9 %
MCH RBC QN AUTO: 35.5 PG (ref 26–34)
MCHC RBC AUTO-ENTMCNC: 35.2 G/DL (ref 31–36)
MCV RBC AUTO: 100.8 FL (ref 80–100)
MONOCYTES ABSOLUTE: 0.4 K/UL (ref 0–1.3)
MONOCYTES RELATIVE PERCENT: 7.6 %
NEUTROPHILS ABSOLUTE: 3.5 K/UL (ref 1.7–7.7)
NEUTROPHILS RELATIVE PERCENT: 69.5 %
PDW BLD-RTO: 14.4 % (ref 12.4–15.4)
PLATELET # BLD: 284 K/UL (ref 135–450)
PMV BLD AUTO: 6.5 FL (ref 5–10.5)
POTASSIUM SERPL-SCNC: 4 MMOL/L (ref 3.5–5.1)
PROSTATE SPECIFIC ANTIGEN: 3.5 NG/ML (ref 0–4)
RBC # BLD: 3.81 M/UL (ref 4.2–5.9)
SODIUM BLD-SCNC: 139 MMOL/L (ref 136–145)
TOTAL PROTEIN: 6.4 G/DL (ref 6.4–8.2)
TRIGL SERPL-MCNC: 65 MG/DL (ref 0–150)
TSH REFLEX: 3.51 UIU/ML (ref 0.27–4.2)
VITAMIN B-12: 328 PG/ML (ref 211–911)
VLDLC SERPL CALC-MCNC: 13 MG/DL
WBC # BLD: 5 K/UL (ref 4–11)

## 2022-10-25 PROCEDURE — G8484 FLU IMMUNIZE NO ADMIN: HCPCS | Performed by: INTERNAL MEDICINE

## 2022-10-25 PROCEDURE — G8417 CALC BMI ABV UP PARAM F/U: HCPCS | Performed by: INTERNAL MEDICINE

## 2022-10-25 PROCEDURE — 1036F TOBACCO NON-USER: CPT | Performed by: INTERNAL MEDICINE

## 2022-10-25 PROCEDURE — 1123F ACP DISCUSS/DSCN MKR DOCD: CPT | Performed by: INTERNAL MEDICINE

## 2022-10-25 PROCEDURE — 3017F COLORECTAL CA SCREEN DOC REV: CPT | Performed by: INTERNAL MEDICINE

## 2022-10-25 PROCEDURE — 99214 OFFICE O/P EST MOD 30 MIN: CPT | Performed by: INTERNAL MEDICINE

## 2022-10-25 PROCEDURE — 36415 COLL VENOUS BLD VENIPUNCTURE: CPT | Performed by: INTERNAL MEDICINE

## 2022-10-25 PROCEDURE — G8427 DOCREV CUR MEDS BY ELIG CLIN: HCPCS | Performed by: INTERNAL MEDICINE

## 2022-10-25 ASSESSMENT — ENCOUNTER SYMPTOMS
COUGH: 0
SORE THROAT: 0
VOMITING: 0
NAUSEA: 0
SHORTNESS OF BREATH: 0
BLOOD IN STOOL: 0
ABDOMINAL PAIN: 0

## 2022-10-25 NOTE — PROGRESS NOTES
Kishor Media (:  1945) is a 78 y.o. male, New patient, here for evaluation of the following chief complaint(s):  Follow-up (3 month follow ) and Hypertension         ASSESSMENT/PLAN:  1. Essential hypertension  - Stable   - Continue current dose of triamterene hydrochlorothiazide  -     Lipid Panel; Future  -     Comprehensive Metabolic Panel; Future  -     TSH with Reflex; Future    2. Benign prostatic hyperplasia with lower urinary tract symptoms, symptom details unspecified  - Stable   - Continue current dose of tamsulosin    3. Severe obesity (BMI 35.0-39. 9) with comorbidity ([facility] [address]  Recommended to follow lifestyle modifications with low-calorie diet and regular exercise. 4. Screening PSA (prostate specific antigen)  -     PSA Screening; Future  5. Macrocytosis  -     CBC with Auto Differential; Future    Return in about 3 months (around 2023). Subjective   SUBJECTIVE/OBJECTIVE:  BPH:  Urinary symptoms controlled with tamsulosin. Hypertension  This is a chronic problem. The current episode started more than 1 year ago. The problem is controlled. Pertinent negatives include no chest pain, palpitations or shortness of breath. Risk factors for coronary artery disease include male gender and obesity. Past treatments include diuretics. The current treatment provides significant improvement. There are no compliance problems. Review of Systems   Constitutional:  Negative for fatigue and fever. HENT:  Negative for nosebleeds and sore throat. Respiratory:  Negative for cough and shortness of breath. Cardiovascular:  Negative for chest pain, palpitations and leg swelling. Gastrointestinal:  Negative for abdominal pain, blood in stool, nausea and vomiting. Neurological:  Negative for dizziness and weakness. Objective   Physical Exam  Constitutional:       Appearance: Normal appearance. HENT:      Head: Normocephalic and atraumatic.    Eyes:      General: No scleral icterus. Conjunctiva/sclera: Conjunctivae normal.   Cardiovascular:      Rate and Rhythm: Normal rate and regular rhythm. Pulses: Normal pulses. Heart sounds: Normal heart sounds. Pulmonary:      Effort: Pulmonary effort is normal.      Breath sounds: Normal breath sounds. Musculoskeletal:         General: No swelling. Skin:     General: Skin is warm and dry. Neurological:      Mental Status: He is alert and oriented to person, place, and time. Mental status is at baseline. Psychiatric:         Mood and Affect: Mood normal.         Behavior: Behavior normal.            An electronic signature was used to authenticate this note.     --Antonia Rosas MD

## 2022-11-09 NOTE — TELEPHONE ENCOUNTER
Last office visit : 10/25/2022    Future Appointments   Date Time Provider Gina Irizarry   1/25/2023  8:15 AM Wilder Christensen MD [facility]

## 2022-11-10 RX ORDER — TRIAMTERENE AND HYDROCHLOROTHIAZIDE 37.5; 25 MG/1; MG/1
TABLET ORAL
Qty: 45 TABLET | Refills: 1 | Status: SHIPPED | OUTPATIENT
Start: 2022-11-10

## 2023-02-09 RX ORDER — TAMSULOSIN HYDROCHLORIDE 0.4 MG/1
CAPSULE ORAL
Qty: 90 CAPSULE | Refills: 3 | Status: SHIPPED | OUTPATIENT
Start: 2023-02-09

## 2023-02-09 NOTE — TELEPHONE ENCOUNTER
Last office visit :01/25/2023    Future Appointments   Date Time Provider Gina Irizarry   4/25/2023  9:00 AM Sunshine Baca MD [facility]

## 2023-04-25 ENCOUNTER — OFFICE VISIT (OUTPATIENT)
Dept: INTERNAL MEDICINE CLINIC | Age: 78
End: 2023-04-25

## 2023-04-25 VITALS
HEART RATE: 75 BPM | WEIGHT: 285 LBS | SYSTOLIC BLOOD PRESSURE: 131 MMHG | DIASTOLIC BLOOD PRESSURE: 79 MMHG | OXYGEN SATURATION: 96 % | BODY MASS INDEX: 38.65 KG/M2 | TEMPERATURE: 98 F

## 2023-04-25 DIAGNOSIS — I10 ESSENTIAL HYPERTENSION: Primary | ICD-10-CM

## 2023-04-25 DIAGNOSIS — N40.1 BENIGN PROSTATIC HYPERPLASIA WITH LOWER URINARY TRACT SYMPTOMS, SYMPTOM DETAILS UNSPECIFIED: ICD-10-CM

## 2023-04-25 DIAGNOSIS — E66.01 SEVERE OBESITY (BMI 35.0-39.9) WITH COMORBIDITY (HCC): ICD-10-CM

## 2023-04-25 SDOH — ECONOMIC STABILITY: FOOD INSECURITY: WITHIN THE PAST 12 MONTHS, THE FOOD YOU BOUGHT JUST DIDN'T LAST AND YOU DIDN'T HAVE MONEY TO GET MORE.: NEVER TRUE

## 2023-04-25 SDOH — ECONOMIC STABILITY: HOUSING INSECURITY
IN THE LAST 12 MONTHS, WAS THERE A TIME WHEN YOU DID NOT HAVE A STEADY PLACE TO SLEEP OR SLEPT IN A SHELTER (INCLUDING NOW)?: NO

## 2023-04-25 SDOH — ECONOMIC STABILITY: FOOD INSECURITY: WITHIN THE PAST 12 MONTHS, YOU WORRIED THAT YOUR FOOD WOULD RUN OUT BEFORE YOU GOT MONEY TO BUY MORE.: NEVER TRUE

## 2023-04-25 SDOH — ECONOMIC STABILITY: INCOME INSECURITY: HOW HARD IS IT FOR YOU TO PAY FOR THE VERY BASICS LIKE FOOD, HOUSING, MEDICAL CARE, AND HEATING?: NOT HARD AT ALL

## 2023-04-25 ASSESSMENT — PATIENT HEALTH QUESTIONNAIRE - PHQ9
1. LITTLE INTEREST OR PLEASURE IN DOING THINGS: 0
SUM OF ALL RESPONSES TO PHQ9 QUESTIONS 1 & 2: 0
SUM OF ALL RESPONSES TO PHQ QUESTIONS 1-9: 0
2. FEELING DOWN, DEPRESSED OR HOPELESS: 0
SUM OF ALL RESPONSES TO PHQ QUESTIONS 1-9: 0

## 2023-04-25 ASSESSMENT — ENCOUNTER SYMPTOMS
VOMITING: 0
NAUSEA: 0
ABDOMINAL PAIN: 0
COUGH: 0
SORE THROAT: 0
SHORTNESS OF BREATH: 0
BLOOD IN STOOL: 0

## 2023-04-25 NOTE — PROGRESS NOTES
Harish Fuller (:  1945) is a 78 y.o. male, New patient, here for evaluation of the following chief complaint(s):  6 Month Follow-Up and Hypertension         ASSESSMENT/PLAN:  1. Essential hypertension  - Stable   - Continue current dose of triamterene hydrochlorothiazide    2. Benign prostatic hyperplasia with lower urinary tract symptoms, symptom details unspecified  - Stable   - Continue current dose of tamsulosin    3. Severe obesity (BMI 35.0-39. 9) with comorbidity ([address]  Continue lifestyle modifications with low-calorie diet and regular exercise. Return in about 3 months (around 2023). Subjective   SUBJECTIVE/OBJECTIVE:  BPH:  Urinary symptoms controlled with tamsulosin. Hypertension  This is a chronic problem. The current episode started more than 1 year ago. The problem is controlled. Pertinent negatives include no chest pain, palpitations or shortness of breath. Risk factors for coronary artery disease include male gender and obesity. Past treatments include diuretics. The current treatment provides significant improvement. There are no compliance problems. Review of Systems   Constitutional:  Negative for fatigue and fever. HENT:  Negative for nosebleeds and sore throat. Respiratory:  Negative for cough and shortness of breath. Cardiovascular:  Negative for chest pain, palpitations and leg swelling. Gastrointestinal:  Negative for abdominal pain, blood in stool, nausea and vomiting. Neurological:  Negative for dizziness and weakness. Objective   Physical Exam  Constitutional:       Appearance: Normal appearance. HENT:      Head: Normocephalic and atraumatic. Eyes:      General: No scleral icterus. Conjunctiva/sclera: Conjunctivae normal.   Cardiovascular:      Rate and Rhythm: Normal rate and regular rhythm. Pulses: Normal pulses. Heart sounds: Normal heart sounds.    Pulmonary:      Effort: Pulmonary effort is normal.      Breath sounds:

## 2023-05-03 RX ORDER — TRIAMTERENE AND HYDROCHLOROTHIAZIDE 37.5; 25 MG/1; MG/1
TABLET ORAL
Qty: 45 TABLET | Refills: 1 | Status: SHIPPED | OUTPATIENT
Start: 2023-05-03

## 2023-05-03 NOTE — TELEPHONE ENCOUNTER
Future Appointments   Date Time Provider Gina Irizarry   7/25/2023  9:15 AM Omayra Watkins MD [facility]     Last appt on 4.25.2023

## 2023-07-25 ENCOUNTER — OFFICE VISIT (OUTPATIENT)
Dept: INTERNAL MEDICINE CLINIC | Age: 78
End: 2023-07-25

## 2023-07-25 VITALS
BODY MASS INDEX: 38.71 KG/M2 | WEIGHT: 285.4 LBS | OXYGEN SATURATION: 97 % | DIASTOLIC BLOOD PRESSURE: 84 MMHG | TEMPERATURE: 98.3 F | HEART RATE: 79 BPM | SYSTOLIC BLOOD PRESSURE: 135 MMHG

## 2023-07-25 DIAGNOSIS — N40.1 BENIGN PROSTATIC HYPERPLASIA WITH LOWER URINARY TRACT SYMPTOMS, SYMPTOM DETAILS UNSPECIFIED: ICD-10-CM

## 2023-07-25 DIAGNOSIS — I10 ESSENTIAL HYPERTENSION: Primary | ICD-10-CM

## 2023-07-25 DIAGNOSIS — E66.01 SEVERE OBESITY (BMI 35.0-39.9) WITH COMORBIDITY (HCC): ICD-10-CM

## 2023-07-25 ASSESSMENT — ENCOUNTER SYMPTOMS
COUGH: 0
VOMITING: 0
SORE THROAT: 0
ABDOMINAL PAIN: 0
BLOOD IN STOOL: 0
NAUSEA: 0
SHORTNESS OF BREATH: 0

## 2023-07-25 NOTE — PROGRESS NOTES
Jose Holliday (:  1945) is a 70 y.o. male, New patient, here for evaluation of the following chief complaint(s):  Hypertension         ASSESSMENT/PLAN:  1. Essential hypertension  - Stable   - Continue current dose of triamterene hydrochlorothiazide    2. Benign prostatic hyperplasia with lower urinary tract symptoms, symptom details unspecified  - Stable   - Continue current dose of tamsulosin    3. Severe obesity (BMI 35.0-39. 9) with comorbidity ([address])  Continue lifestyle modifications with low-calorie diet and regular exercise. Return in about 3 months (around 10/25/2023). Subjective   SUBJECTIVE/OBJECTIVE:  BPH:  Urinary symptoms controlled with tamsulosin. Hypertension  This is a chronic problem. The current episode started more than 1 year ago. The problem is controlled. Pertinent negatives include no chest pain, palpitations or shortness of breath. Risk factors for coronary artery disease include male gender and obesity. Past treatments include diuretics. The current treatment provides significant improvement. There are no compliance problems. Review of Systems   Constitutional:  Negative for fatigue and fever. HENT:  Negative for nosebleeds and sore throat. Respiratory:  Negative for cough and shortness of breath. Cardiovascular:  Negative for chest pain, palpitations and leg swelling. Gastrointestinal:  Negative for abdominal pain, blood in stool, nausea and vomiting. Neurological:  Negative for dizziness and weakness. Objective   Physical Exam  Constitutional:       Appearance: Normal appearance. HENT:      Head: Normocephalic and atraumatic. Eyes:      General: No scleral icterus. Conjunctiva/sclera: Conjunctivae normal.   Cardiovascular:      Rate and Rhythm: Normal rate and regular rhythm. Pulses: Normal pulses. Heart sounds: Normal heart sounds. Pulmonary:      Effort: Pulmonary effort is normal.      Breath sounds: Normal breath sounds.
no

## 2023-08-25 ENCOUNTER — TELEPHONE (OUTPATIENT)
Dept: INTERNAL MEDICINE CLINIC | Age: 78
End: 2023-08-25

## 2023-08-25 NOTE — TELEPHONE ENCOUNTER
----- Message from Marium Louise sent at 8/25/2023 12:51 PM EDT -----  Subject: Message to Provider    QUESTIONS  Information for Provider? Patient and his wife are wanting to set a appt. for the high dosage flu shot. I do not know when you will have the flu   shot and will he just need to see a nurse. Please contact patient  ---------------------------------------------------------------------------  --------------  Lonnie REICH  [phone]; OK to leave message on voicemail  ---------------------------------------------------------------------------  --------------  SCRIPT ANSWERS  Relationship to Patient?  Self

## 2023-10-10 ENCOUNTER — OFFICE VISIT (OUTPATIENT)
Dept: INTERNAL MEDICINE CLINIC | Age: 78
End: 2023-10-10

## 2023-10-10 DIAGNOSIS — Z00.00 MEDICARE ANNUAL WELLNESS VISIT, SUBSEQUENT: Primary | ICD-10-CM

## 2023-10-10 ASSESSMENT — PATIENT HEALTH QUESTIONNAIRE - PHQ9
SUM OF ALL RESPONSES TO PHQ QUESTIONS 1-9: 1
SUM OF ALL RESPONSES TO PHQ QUESTIONS 1-9: 1
SUM OF ALL RESPONSES TO PHQ9 QUESTIONS 1 & 2: 1
SUM OF ALL RESPONSES TO PHQ QUESTIONS 1-9: 1
1. LITTLE INTEREST OR PLEASURE IN DOING THINGS: 0
2. FEELING DOWN, DEPRESSED OR HOPELESS: 1
SUM OF ALL RESPONSES TO PHQ QUESTIONS 1-9: 1

## 2023-10-10 ASSESSMENT — LIFESTYLE VARIABLES
HOW MANY STANDARD DRINKS CONTAINING ALCOHOL DO YOU HAVE ON A TYPICAL DAY: PATIENT DOES NOT DRINK
HOW OFTEN DO YOU HAVE A DRINK CONTAINING ALCOHOL: NEVER

## 2023-10-10 NOTE — PATIENT INSTRUCTIONS

## 2023-10-10 NOTE — PROGRESS NOTES
37.5-25 MG per tablet TAKE 1/2 TABLET BY MOUTH EVERY DAY Yes Michele Peña MD   tamsulosin (FLOMAX) 0.4 MG capsule TAKE 1 CAPSULE BY MOUTH EVERY DAY Yes Oanh Persaud MD   [employer] by Does not apply route Yes Regina Bahena MD   Omega-3 Fatty Acids (FISH OIL) 1000 MG CAPS Take 3 capsules by mouth daily Yes ProviderGuy MD   vitamin D (CHOLECALCIFEROL) 1000 UNIT TABS tablet Take 1 tablet by mouth Yes ProviderGuy MD   hydroxyurea (HYDREA) 500 MG chemo capsule TAKE TWO CAPSULES BY MOUTH DAILY Yes Francesca Victoria MD   IRON PO Take 65 mg by mouth daily. Yes ProviderGuy MD   aspirin EC 81 MG EC tablet Take 1 tablet by mouth 2 times daily Take for DVT blood clot prophylaxis. Please avoid missing doses.   [facility]WA[facility] Sanchez - [facility] (Including outside providers/suppliers regularly involved in providing care):   Patient Care Team:  Michele Peña MD as PCP - General (Internal Medicine)  Francesca Victoria MD as PCP - Hematology/Oncology (Hematology and Oncology)  Michele Peña MD as PCP - Empaneled Provider  Henrietta Valdes as  (Hematology and Oncology)     Reviewed and updated this visit:  Allergies  Meds

## 2023-10-25 ENCOUNTER — OFFICE VISIT (OUTPATIENT)
Dept: INTERNAL MEDICINE CLINIC | Age: 78
End: 2023-10-25

## 2023-10-25 VITALS
DIASTOLIC BLOOD PRESSURE: 89 MMHG | HEART RATE: 94 BPM | SYSTOLIC BLOOD PRESSURE: 135 MMHG | TEMPERATURE: 98.5 F | BODY MASS INDEX: 38.03 KG/M2 | OXYGEN SATURATION: 95 % | WEIGHT: 280.4 LBS

## 2023-10-25 DIAGNOSIS — N40.1 BENIGN PROSTATIC HYPERPLASIA WITH LOWER URINARY TRACT SYMPTOMS, SYMPTOM DETAILS UNSPECIFIED: ICD-10-CM

## 2023-10-25 DIAGNOSIS — E66.01 SEVERE OBESITY (BMI 35.0-39.9) WITH COMORBIDITY (HCC): ICD-10-CM

## 2023-10-25 DIAGNOSIS — I10 ESSENTIAL HYPERTENSION: Primary | ICD-10-CM

## 2023-10-25 DIAGNOSIS — D47.3 ESSENTIAL THROMBOCYTHEMIA (HCC): ICD-10-CM

## 2023-10-25 DIAGNOSIS — R73.09 INCREASED BLOOD GLUCOSE: ICD-10-CM

## 2023-10-25 RX ORDER — TRIAMTERENE AND HYDROCHLOROTHIAZIDE 37.5; 25 MG/1; MG/1
TABLET ORAL
Qty: 45 TABLET | Refills: 1 | Status: SHIPPED | OUTPATIENT
Start: 2023-10-25

## 2023-10-25 RX ORDER — TAMSULOSIN HYDROCHLORIDE 0.4 MG/1
0.8 CAPSULE ORAL DAILY
Qty: 180 CAPSULE | Refills: 1 | Status: SHIPPED | OUTPATIENT
Start: 2023-10-25 | End: 2024-01-23

## 2023-10-25 ASSESSMENT — ENCOUNTER SYMPTOMS
SORE THROAT: 0
BLOOD IN STOOL: 0
VOMITING: 0
ABDOMINAL PAIN: 0
COUGH: 0
NAUSEA: 0
SHORTNESS OF BREATH: 0

## 2023-10-25 NOTE — PROGRESS NOTES
Alton Marc (:  1945) is a 70 y.o. male, New patient, here for evaluation of the following chief complaint(s):  Hypertension         ASSESSMENT/PLAN:  1. Essential hypertension  - Stable   - Continue current dose of triamterene hydrochlorothiazide  -     Lipid Panel; Future  -     Comprehensive Metabolic Panel; Future  -     TSH with Reflex; Future  2. Benign prostatic hyperplasia with lower urinary tract symptoms, symptom details unspecified  - Stable   - Continue current dose of tamsulosin  -     tamsulosin (FLOMAX) 0.4 MG capsule; Take 2 capsules by mouth daily, Disp-180 capsule, R-1Normal  3. Severe obesity (BMI 35.0-39. 9) with comorbidity ([address])  Continue lifestyle modifications with low-calorie diet and regular exercise. 4. Essential thrombocythemia ([address])  -     CBC with Auto Differential; Future  5. Increased blood glucose  -     Hemoglobin A1C; Future      Return in about 3 months (around 2024). Subjective   SUBJECTIVE/OBJECTIVE:  BPH:  Urinary symptoms controlled with tamsulosin. Hypertension  This is a chronic problem. The current episode started more than 1 year ago. The problem is controlled. Pertinent negatives include no chest pain, palpitations or shortness of breath. Risk factors for coronary artery disease include male gender and obesity. Past treatments include diuretics. The current treatment provides significant improvement. There are no compliance problems. Review of Systems   Constitutional:  Negative for fatigue and fever. HENT:  Negative for nosebleeds and sore throat. Respiratory:  Negative for cough and shortness of breath. Cardiovascular:  Negative for chest pain, palpitations and leg swelling. Gastrointestinal:  Negative for abdominal pain, blood in stool, nausea and vomiting. Neurological:  Negative for dizziness and weakness. Objective   Physical Exam  Constitutional:       Appearance: Normal appearance.    HENT:      Head:

## 2023-10-26 ENCOUNTER — NURSE ONLY (OUTPATIENT)
Dept: INTERNAL MEDICINE CLINIC | Age: 78
End: 2023-10-26
Payer: MEDICARE

## 2023-10-26 DIAGNOSIS — D47.3 ESSENTIAL THROMBOCYTHEMIA (HCC): ICD-10-CM

## 2023-10-26 DIAGNOSIS — I10 ESSENTIAL HYPERTENSION: ICD-10-CM

## 2023-10-26 DIAGNOSIS — R73.09 INCREASED BLOOD GLUCOSE: ICD-10-CM

## 2023-10-26 LAB
ALBUMIN SERPL-MCNC: 4.3 G/DL (ref 3.4–5)
ALBUMIN/GLOB SERPL: 2.3 {RATIO} (ref 1.1–2.2)
ALP SERPL-CCNC: 54 U/L (ref 40–129)
ALT SERPL-CCNC: 11 U/L (ref 10–40)
ANION GAP SERPL CALCULATED.3IONS-SCNC: 8 MMOL/L (ref 3–16)
AST SERPL-CCNC: 18 U/L (ref 15–37)
BASOPHILS # BLD: 0.1 K/UL (ref 0–0.2)
BASOPHILS NFR BLD: 1 %
BILIRUB SERPL-MCNC: 0.6 MG/DL (ref 0–1)
BUN SERPL-MCNC: 18 MG/DL (ref 7–20)
CALCIUM SERPL-MCNC: 9.1 MG/DL (ref 8.3–10.6)
CHLORIDE SERPL-SCNC: 105 MMOL/L (ref 99–110)
CHOLEST SERPL-MCNC: 134 MG/DL (ref 0–199)
CO2 SERPL-SCNC: 28 MMOL/L (ref 21–32)
CREAT SERPL-MCNC: 0.8 MG/DL (ref 0.8–1.3)
DEPRECATED RDW RBC AUTO: 14.7 % (ref 12.4–15.4)
EOSINOPHIL # BLD: 0.2 K/UL (ref 0–0.6)
EOSINOPHIL NFR BLD: 3.5 %
GFR SERPLBLD CREATININE-BSD FMLA CKD-EPI: >60 ML/MIN/{1.73_M2}
GLUCOSE SERPL-MCNC: 105 MG/DL (ref 70–99)
HCT VFR BLD AUTO: 36.8 % (ref 40.5–52.5)
HDLC SERPL-MCNC: 39 MG/DL (ref 40–60)
HGB BLD-MCNC: 13.2 G/DL (ref 13.5–17.5)
LDLC SERPL CALC-MCNC: 81 MG/DL
LYMPHOCYTES # BLD: 1.1 K/UL (ref 1–5.1)
LYMPHOCYTES NFR BLD: 20.3 %
MCH RBC QN AUTO: 35.4 PG (ref 26–34)
MCHC RBC AUTO-ENTMCNC: 35.8 G/DL (ref 31–36)
MCV RBC AUTO: 99.1 FL (ref 80–100)
MONOCYTES # BLD: 0.4 K/UL (ref 0–1.3)
MONOCYTES NFR BLD: 7.2 %
NEUTROPHILS # BLD: 3.8 K/UL (ref 1.7–7.7)
NEUTROPHILS NFR BLD: 68 %
PLATELET # BLD AUTO: 320 K/UL (ref 135–450)
PMV BLD AUTO: 6.8 FL (ref 5–10.5)
POTASSIUM SERPL-SCNC: 4 MMOL/L (ref 3.5–5.1)
PROT SERPL-MCNC: 6.2 G/DL (ref 6.4–8.2)
RBC # BLD AUTO: 3.71 M/UL (ref 4.2–5.9)
SODIUM SERPL-SCNC: 141 MMOL/L (ref 136–145)
TRIGL SERPL-MCNC: 68 MG/DL (ref 0–150)
TSH SERPL DL<=0.005 MIU/L-ACNC: 3.09 UIU/ML (ref 0.27–4.2)
VLDLC SERPL CALC-MCNC: 14 MG/DL
WBC # BLD AUTO: 5.5 K/UL (ref 4–11)

## 2023-10-26 PROCEDURE — 36415 COLL VENOUS BLD VENIPUNCTURE: CPT | Performed by: INTERNAL MEDICINE

## 2023-10-26 NOTE — PROGRESS NOTES
Attempted to collect blood from pts left antecubital site. Did not collect. Had to switch to right site with no issues.

## 2023-10-27 LAB
EST. AVERAGE GLUCOSE BLD GHB EST-MCNC: 73.8 MG/DL
HBA1C MFR BLD: 4.2 %

## 2023-10-28 DIAGNOSIS — D64.9 ANEMIA, UNSPECIFIED TYPE: Primary | ICD-10-CM

## 2023-10-28 LAB
FERRITIN SERPL IA-MCNC: 280.2 NG/ML (ref 30–400)
FOLATE SERPL-MCNC: 17.41 NG/ML (ref 4.78–24.2)
IRON SATN MFR SERPL: 28 % (ref 20–50)
IRON SERPL-MCNC: 66 UG/DL (ref 59–158)
TIBC SERPL-MCNC: 238 UG/DL (ref 260–445)
VIT B12 SERPL-MCNC: 352 PG/ML (ref 211–911)

## 2023-11-17 ENCOUNTER — TELEPHONE (OUTPATIENT)
Dept: INTERNAL MEDICINE CLINIC | Age: 78
End: 2023-11-17

## 2024-01-31 ENCOUNTER — OFFICE VISIT (OUTPATIENT)
Dept: INTERNAL MEDICINE CLINIC | Age: 79
End: 2024-01-31
Payer: MEDICARE

## 2024-01-31 VITALS
WEIGHT: 280 LBS | OXYGEN SATURATION: 98 % | HEART RATE: 80 BPM | DIASTOLIC BLOOD PRESSURE: 82 MMHG | SYSTOLIC BLOOD PRESSURE: 128 MMHG | BODY MASS INDEX: 37.93 KG/M2 | HEIGHT: 72 IN

## 2024-01-31 DIAGNOSIS — E66.01 SEVERE OBESITY (BMI 35.0-39.9) WITH COMORBIDITY (HCC): ICD-10-CM

## 2024-01-31 DIAGNOSIS — D47.3 ESSENTIAL THROMBOCYTHEMIA (HCC): ICD-10-CM

## 2024-01-31 DIAGNOSIS — R26.89 BALANCE PROBLEM: ICD-10-CM

## 2024-01-31 DIAGNOSIS — N40.1 BENIGN PROSTATIC HYPERPLASIA WITH LOWER URINARY TRACT SYMPTOMS, SYMPTOM DETAILS UNSPECIFIED: ICD-10-CM

## 2024-01-31 DIAGNOSIS — I10 ESSENTIAL HYPERTENSION: Primary | ICD-10-CM

## 2024-01-31 PROCEDURE — 1123F ACP DISCUSS/DSCN MKR DOCD: CPT | Performed by: INTERNAL MEDICINE

## 2024-01-31 PROCEDURE — G8484 FLU IMMUNIZE NO ADMIN: HCPCS | Performed by: INTERNAL MEDICINE

## 2024-01-31 PROCEDURE — 1036F TOBACCO NON-USER: CPT | Performed by: INTERNAL MEDICINE

## 2024-01-31 PROCEDURE — 3074F SYST BP LT 130 MM HG: CPT | Performed by: INTERNAL MEDICINE

## 2024-01-31 PROCEDURE — G8427 DOCREV CUR MEDS BY ELIG CLIN: HCPCS | Performed by: INTERNAL MEDICINE

## 2024-01-31 PROCEDURE — 99214 OFFICE O/P EST MOD 30 MIN: CPT | Performed by: INTERNAL MEDICINE

## 2024-01-31 PROCEDURE — 3079F DIAST BP 80-89 MM HG: CPT | Performed by: INTERNAL MEDICINE

## 2024-01-31 PROCEDURE — G8417 CALC BMI ABV UP PARAM F/U: HCPCS | Performed by: INTERNAL MEDICINE

## 2024-01-31 ASSESSMENT — PATIENT HEALTH QUESTIONNAIRE - PHQ9
SUM OF ALL RESPONSES TO PHQ QUESTIONS 1-9: 0
SUM OF ALL RESPONSES TO PHQ9 QUESTIONS 1 & 2: 0
2. FEELING DOWN, DEPRESSED OR HOPELESS: 0
SUM OF ALL RESPONSES TO PHQ QUESTIONS 1-9: 0
SUM OF ALL RESPONSES TO PHQ QUESTIONS 1-9: 0
1. LITTLE INTEREST OR PLEASURE IN DOING THINGS: 0
SUM OF ALL RESPONSES TO PHQ QUESTIONS 1-9: 0

## 2024-01-31 NOTE — PROGRESS NOTES
rate and regular rhythm.      Pulses: Normal pulses.      Heart sounds: Normal heart sounds.   Pulmonary:      Effort: Pulmonary effort is normal.      Breath sounds: Normal breath sounds.   Musculoskeletal:         General: No swelling.   Skin:     General: Skin is warm and dry.   Neurological:      Mental Status: He is alert and oriented to person, place, and time. Mental status is at baseline.   Psychiatric:         Mood and Affect: Mood normal.         Behavior: Behavior normal.              An electronic signature was used to authenticate this note.    --GADIEL JACKSON MD

## 2024-03-26 ENCOUNTER — TELEPHONE (OUTPATIENT)
Dept: INTERNAL MEDICINE CLINIC | Age: 79
End: 2024-03-26

## 2024-03-26 DIAGNOSIS — R00.9 ABNORMAL HEART RATE: Primary | ICD-10-CM

## 2024-03-26 NOTE — TELEPHONE ENCOUNTER
When a visiting Dr was doing a wellness exam at his house they found a regular irregular heart beat. Just wanted you to know...

## 2024-04-15 ENCOUNTER — OFFICE VISIT (OUTPATIENT)
Dept: INTERNAL MEDICINE CLINIC | Age: 79
End: 2024-04-15
Payer: MEDICARE

## 2024-04-15 VITALS
TEMPERATURE: 99.4 F | WEIGHT: 276 LBS | DIASTOLIC BLOOD PRESSURE: 78 MMHG | BODY MASS INDEX: 37.42 KG/M2 | SYSTOLIC BLOOD PRESSURE: 134 MMHG | OXYGEN SATURATION: 95 % | HEART RATE: 98 BPM

## 2024-04-15 DIAGNOSIS — R05.1 ACUTE COUGH: Primary | ICD-10-CM

## 2024-04-15 PROCEDURE — 87804 INFLUENZA ASSAY W/OPTIC: CPT | Performed by: NURSE PRACTITIONER

## 2024-04-15 PROCEDURE — 3075F SYST BP GE 130 - 139MM HG: CPT | Performed by: NURSE PRACTITIONER

## 2024-04-15 PROCEDURE — G8417 CALC BMI ABV UP PARAM F/U: HCPCS | Performed by: NURSE PRACTITIONER

## 2024-04-15 PROCEDURE — 1123F ACP DISCUSS/DSCN MKR DOCD: CPT | Performed by: NURSE PRACTITIONER

## 2024-04-15 PROCEDURE — G8427 DOCREV CUR MEDS BY ELIG CLIN: HCPCS | Performed by: NURSE PRACTITIONER

## 2024-04-15 PROCEDURE — 3078F DIAST BP <80 MM HG: CPT | Performed by: NURSE PRACTITIONER

## 2024-04-15 PROCEDURE — 1036F TOBACCO NON-USER: CPT | Performed by: NURSE PRACTITIONER

## 2024-04-15 PROCEDURE — 99213 OFFICE O/P EST LOW 20 MIN: CPT | Performed by: NURSE PRACTITIONER

## 2024-04-15 RX ORDER — DOXYCYCLINE HYCLATE 100 MG
100 TABLET ORAL 2 TIMES DAILY
Qty: 14 TABLET | Refills: 0 | Status: SHIPPED | OUTPATIENT
Start: 2024-04-15 | End: 2024-04-22

## 2024-04-15 RX ORDER — AMOXICILLIN 500 MG/1
1000 CAPSULE ORAL 3 TIMES DAILY
Qty: 30 CAPSULE | Refills: 0 | Status: SHIPPED | OUTPATIENT
Start: 2024-04-15 | End: 2024-04-17

## 2024-04-15 RX ORDER — GUAIFENESIN 600 MG/1
600 TABLET, EXTENDED RELEASE ORAL 2 TIMES DAILY
Qty: 30 TABLET | Refills: 0 | Status: SHIPPED | OUTPATIENT
Start: 2024-04-15 | End: 2024-04-30

## 2024-04-15 RX ORDER — TRIAMTERENE AND HYDROCHLOROTHIAZIDE 37.5; 25 MG/1; MG/1
TABLET ORAL
Qty: 45 TABLET | Refills: 1 | Status: SHIPPED | OUTPATIENT
Start: 2024-04-15

## 2024-04-15 RX ORDER — ALBUTEROL SULFATE 90 UG/1
2 AEROSOL, METERED RESPIRATORY (INHALATION) 4 TIMES DAILY PRN
Qty: 54 G | Refills: 0 | Status: SHIPPED | OUTPATIENT
Start: 2024-04-15

## 2024-04-15 RX ORDER — PREDNISONE 20 MG/1
40 TABLET ORAL DAILY
Qty: 10 TABLET | Refills: 0 | Status: SHIPPED | OUTPATIENT
Start: 2024-04-15 | End: 2024-04-20

## 2024-04-15 ASSESSMENT — ENCOUNTER SYMPTOMS
WHEEZING: 1
NAUSEA: 0
EYES NEGATIVE: 1
ALLERGIC/IMMUNOLOGIC NEGATIVE: 1
SINUS PAIN: 0
SINUS PRESSURE: 0
SORE THROAT: 1
RHINORRHEA: 0
DIARRHEA: 0
VOMITING: 0
SHORTNESS OF BREATH: 1

## 2024-04-15 NOTE — TELEPHONE ENCOUNTER
Future Appointments   Date Time Provider Department Center   5/1/2024  3:30 PM Michel Gunter MD [facility]       Last appt 1/31/24

## 2024-04-15 NOTE — PATIENT INSTRUCTIONS
Drink plenty of water  Start prednisone (steroid) and antibiotics (amoxicillin and doxycyline)  Use inhaler (albuterol) if needed  Take mucinex  Go to ER for any trouble breathing, light headedness or feeling worse

## 2024-04-15 NOTE — PROGRESS NOTES
SUBJECTIVE/OBJECTIVE:  HPI  Chief Complaint: Wheezing and Shortness of Breath (Sore throat-  All symptoms started on 2024)      Shadi Carreon (:  1945) is a 78 y.o. male here for sore throat. His symptoms began 2 days ago with a dry cough. He did use otc cough syrup and this improved the cough but he continued with sore throat. His cough is more productive as of this morning. He did experience a fever of 100.7 on yesterday. He denies chills but is experiencing body aches. He has been wheezing and finds this is worse in the evening. He has not taken other otc medications other than the cough syrup. He denies sick contacts. He did take a home COVID test 2 days ago that was negative. He reports shortness of breath over the past couple days this is exacerbated with a coughing fit.     Review of Systems   Constitutional:  Positive for chills and fever.   HENT:  Positive for sore throat. Negative for congestion, rhinorrhea, sinus pressure and sinus pain.    Eyes: Negative.    Respiratory:  Positive for shortness of breath and wheezing.    Cardiovascular:  Negative for chest pain.   Gastrointestinal:  Negative for diarrhea, nausea and vomiting.   Endocrine: Negative.    Genitourinary:  Negative for difficulty urinating.   Musculoskeletal:  Positive for myalgias.   Skin: Negative.    Allergic/Immunologic: Negative.    Neurological:  Negative for dizziness, light-headedness and headaches.   Hematological: Negative.    Psychiatric/Behavioral: Negative.         Physical Exam  Constitutional:       Appearance: He is normal weight.   HENT:      Head: Normocephalic and atraumatic.      Nose: Nose normal.      Mouth/Throat:      Mouth: Mucous membranes are moist.      Pharynx: Oropharynx is clear.   Eyes:      Conjunctiva/sclera: Conjunctivae normal.      Pupils: Pupils are equal, round, and reactive to light.   Cardiovascular:      Rate and Rhythm: Normal rate and regular rhythm.      Pulses: Normal pulses.

## 2024-04-16 LAB — SARS-COV-2 RNA RESP QL NAA+PROBE: NOT DETECTED

## 2024-04-17 ENCOUNTER — OFFICE VISIT (OUTPATIENT)
Dept: INTERNAL MEDICINE CLINIC | Age: 79
End: 2024-04-17
Payer: MEDICARE

## 2024-04-17 VITALS
SYSTOLIC BLOOD PRESSURE: 110 MMHG | OXYGEN SATURATION: 90 % | WEIGHT: 275.2 LBS | BODY MASS INDEX: 37.32 KG/M2 | TEMPERATURE: 99.4 F | DIASTOLIC BLOOD PRESSURE: 70 MMHG | HEART RATE: 113 BPM

## 2024-04-17 DIAGNOSIS — N40.1 BENIGN PROSTATIC HYPERPLASIA WITH LOWER URINARY TRACT SYMPTOMS, SYMPTOM DETAILS UNSPECIFIED: ICD-10-CM

## 2024-04-17 DIAGNOSIS — J06.9 URTI (ACUTE UPPER RESPIRATORY INFECTION): Primary | ICD-10-CM

## 2024-04-17 DIAGNOSIS — R05.1 ACUTE COUGH: ICD-10-CM

## 2024-04-17 PROCEDURE — 3078F DIAST BP <80 MM HG: CPT | Performed by: STUDENT IN AN ORGANIZED HEALTH CARE EDUCATION/TRAINING PROGRAM

## 2024-04-17 PROCEDURE — 99214 OFFICE O/P EST MOD 30 MIN: CPT | Performed by: STUDENT IN AN ORGANIZED HEALTH CARE EDUCATION/TRAINING PROGRAM

## 2024-04-17 PROCEDURE — 3074F SYST BP LT 130 MM HG: CPT | Performed by: STUDENT IN AN ORGANIZED HEALTH CARE EDUCATION/TRAINING PROGRAM

## 2024-04-17 PROCEDURE — G8417 CALC BMI ABV UP PARAM F/U: HCPCS | Performed by: STUDENT IN AN ORGANIZED HEALTH CARE EDUCATION/TRAINING PROGRAM

## 2024-04-17 PROCEDURE — 1036F TOBACCO NON-USER: CPT | Performed by: STUDENT IN AN ORGANIZED HEALTH CARE EDUCATION/TRAINING PROGRAM

## 2024-04-17 PROCEDURE — G8427 DOCREV CUR MEDS BY ELIG CLIN: HCPCS | Performed by: STUDENT IN AN ORGANIZED HEALTH CARE EDUCATION/TRAINING PROGRAM

## 2024-04-17 PROCEDURE — 1123F ACP DISCUSS/DSCN MKR DOCD: CPT | Performed by: STUDENT IN AN ORGANIZED HEALTH CARE EDUCATION/TRAINING PROGRAM

## 2024-04-17 RX ORDER — TAMSULOSIN HYDROCHLORIDE 0.4 MG/1
CAPSULE ORAL DAILY
Qty: 90 CAPSULE | Refills: 3 | Status: SHIPPED | OUTPATIENT
Start: 2024-04-17

## 2024-04-17 RX ORDER — METRONIDAZOLE 500 MG/1
500 TABLET ORAL 3 TIMES DAILY
Qty: 15 TABLET | Refills: 0 | Status: SHIPPED | OUTPATIENT
Start: 2024-04-17 | End: 2024-04-22

## 2024-04-17 RX ORDER — METRONIDAZOLE 500 MG/1
500 TABLET ORAL 2 TIMES DAILY
Qty: 14 TABLET | Refills: 0 | Status: SHIPPED | OUTPATIENT
Start: 2024-04-17 | End: 2024-04-17

## 2024-04-17 ASSESSMENT — ENCOUNTER SYMPTOMS: COUGH: 1

## 2024-04-17 NOTE — PROGRESS NOTES
Shadi Carreon (:  1945) is a 78 y.o. male,Established patient, here for evaluation of the following chief complaint(s):  Cough and Congestion (Follow from URI)      The patient was evaluated on  for cough, sore throat, wheezing, fever his flu and COVID test was negative.  He was started on steroid, amoxicillin and doxycycline for CAP.  Has been using his inhaler, has chest tightness when cough.  Cough improved.      Assessment & Plan   1. URTI (acute upper respiratory infection) symptomatically patient reports he has noted improvement since his last visit.  However continues to have diffuse wheezing and reports of chest pain and cough  Profuse sweating present.  He started taking his medication from Monday evening.  Continue with doxycycline, stop amoxicillin.  Will start on flagyl for anaerobic coverage.  2. Acute cough, continue with use of Mucinex and albuterol as needed.      Return if symptoms worsen or fail to improve.       Subjective   Cough        Review of Systems   Respiratory:  Positive for cough.           Objective   Physical Exam  Vitals reviewed.   Constitutional:       Appearance: Normal appearance.   HENT:      Head: Normocephalic.   Eyes:      Extraocular Movements: Extraocular movements intact.      Pupils: Pupils are equal, round, and reactive to light.   Cardiovascular:      Rate and Rhythm: Normal rate.      Heart sounds: Normal heart sounds. No murmur heard.  Pulmonary:      Effort: Pulmonary effort is normal.      Breath sounds: Rales present.   Abdominal:      General: Bowel sounds are normal.      Palpations: Abdomen is soft.   Musculoskeletal:         General: Normal range of motion.   Skin:     General: Skin is warm.   Neurological:      General: No focal deficit present.      Mental Status: He is alert and oriented to person, place, and time. Mental status is at baseline.   Psychiatric:         Mood and Affect: Mood normal.              Please note that this chart was

## 2024-04-17 NOTE — TELEPHONE ENCOUNTER
Medication:   Requested Prescriptions     Pending Prescriptions Disp Refills    tamsulosin (FLOMAX) 0.4 MG capsule [Pharmacy Med Name: TAMSULOSIN HCL 0.4 MG CAPSULE] 90 capsule 3     Sig: TAKE 1 CAPSULE BY MOUTH EVERY DAY       Last Filled:  10.25.23    Patient Phone Number: [phone] (home) [phone] (work)    Last appt: 4/15/2024   Next appt: 4/17/2024  Future Appointments   Date Time Provider Department Center   5/1/2024  3:30 PM Michel Gunter MD [facility] [place] NUR

## 2024-04-23 NOTE — PROGRESS NOTES
ARTHROPLASTY Right 8/20/2019    RIGHT LATERAL TOTAL HIP REPLACEMENT performed by Oscar Navarro MD at [facility] OR     Family History   Problem Relation Age of Onset    Cancer Mother         lymphoma    Heart Failure Father     Hypertension Father     Heart Disease Father      Social History     Tobacco Use    Smoking status: Never    Smokeless tobacco: Never   Vaping Use    Vaping Use: Never used   Substance Use Topics    Alcohol use: Yes     Comment: seldom    Drug use: No       No Known Allergies  Current Outpatient Medications   Medication Sig Dispense Refill    tamsulosin (FLOMAX) 0.4 MG capsule TAKE 1 CAPSULE BY MOUTH EVERY DAY (Patient taking differently: Take 1 capsule by mouth daily 2 capsules daily) 90 capsule 3    triamterene-hydroCHLOROthiazide (MAXZIDE-25) 37.5-25 MG per tablet TAKE 1/2 TABLET BY MOUTH DAILY 45 tablet 1    Handicap Placard MISC by Does not apply route 1 each 0    Omega-3 Fatty Acids (FISH OIL) 1000 MG CAPS Take 3 capsules by mouth daily      aspirin EC 81 MG EC tablet Take 1 tablet by mouth 2 times daily Take for DVT blood clot prophylaxis. Please avoid missing doses. 60 tablet 0    vitamin D (CHOLECALCIFEROL) 1000 UNIT TABS tablet Take 1 tablet by mouth      hydroxyurea (HYDREA) 500 MG chemo capsule TAKE TWO CAPSULES BY MOUTH DAILY 60 capsule 2    IRON PO Take 65 mg by mouth daily.       No current facility-administered medications for this visit.       Review of Systems:  Constitutional: No unanticipated weight loss. There's been no change in energy level, sleep pattern, or activity level. No fevers, chills.   Eyes: No visual changes or diplopia. No scleral icterus.  ENT: No Headaches, hearing loss or vertigo. No mouth sores or sore throat.  Cardiovascular: as reviewed in HPI  Respiratory: No cough or wheezing, no sputum production. No hemoptysis.     Gastrointestinal: No abdominal pain, appetite loss, blood in stools. No change in bowel or bladder habits.  Genitourinary: No dysuria,

## 2024-04-24 ENCOUNTER — OFFICE VISIT (OUTPATIENT)
Dept: CARDIOLOGY CLINIC | Age: 79
End: 2024-04-24
Payer: MEDICARE

## 2024-04-24 VITALS
HEART RATE: 90 BPM | WEIGHT: 281 LBS | OXYGEN SATURATION: 96 % | BODY MASS INDEX: 38.06 KG/M2 | DIASTOLIC BLOOD PRESSURE: 72 MMHG | HEIGHT: 72 IN | SYSTOLIC BLOOD PRESSURE: 122 MMHG

## 2024-04-24 DIAGNOSIS — I49.9 IRREGULAR HEART BEAT: Primary | ICD-10-CM

## 2024-04-24 DIAGNOSIS — E66.01 CLASS 2 SEVERE OBESITY DUE TO EXCESS CALORIES WITH SERIOUS COMORBIDITY AND BODY MASS INDEX (BMI) OF 38.0 TO 38.9 IN ADULT (HCC): ICD-10-CM

## 2024-04-24 DIAGNOSIS — I10 ESSENTIAL HYPERTENSION: ICD-10-CM

## 2024-04-24 DIAGNOSIS — R06.09 DOE (DYSPNEA ON EXERTION): ICD-10-CM

## 2024-04-24 DIAGNOSIS — R60.0 BILATERAL LOWER EXTREMITY EDEMA: ICD-10-CM

## 2024-04-24 PROCEDURE — G8427 DOCREV CUR MEDS BY ELIG CLIN: HCPCS | Performed by: INTERNAL MEDICINE

## 2024-04-24 PROCEDURE — 3074F SYST BP LT 130 MM HG: CPT | Performed by: INTERNAL MEDICINE

## 2024-04-24 PROCEDURE — 1036F TOBACCO NON-USER: CPT | Performed by: INTERNAL MEDICINE

## 2024-04-24 PROCEDURE — 93000 ELECTROCARDIOGRAM COMPLETE: CPT | Performed by: INTERNAL MEDICINE

## 2024-04-24 PROCEDURE — 3078F DIAST BP <80 MM HG: CPT | Performed by: INTERNAL MEDICINE

## 2024-04-24 PROCEDURE — 99204 OFFICE O/P NEW MOD 45 MIN: CPT | Performed by: INTERNAL MEDICINE

## 2024-04-24 PROCEDURE — 1123F ACP DISCUSS/DSCN MKR DOCD: CPT | Performed by: INTERNAL MEDICINE

## 2024-04-24 PROCEDURE — G8417 CALC BMI ABV UP PARAM F/U: HCPCS | Performed by: INTERNAL MEDICINE

## 2024-05-01 ENCOUNTER — OFFICE VISIT (OUTPATIENT)
Dept: INTERNAL MEDICINE CLINIC | Age: 79
End: 2024-05-01

## 2024-05-01 VITALS
SYSTOLIC BLOOD PRESSURE: 138 MMHG | BODY MASS INDEX: 38.06 KG/M2 | HEIGHT: 72 IN | DIASTOLIC BLOOD PRESSURE: 82 MMHG | WEIGHT: 281 LBS | OXYGEN SATURATION: 92 % | HEART RATE: 64 BPM

## 2024-05-01 DIAGNOSIS — D47.3 ESSENTIAL THROMBOCYTHEMIA (HCC): ICD-10-CM

## 2024-05-01 DIAGNOSIS — I10 ESSENTIAL HYPERTENSION: Primary | ICD-10-CM

## 2024-05-01 DIAGNOSIS — N40.1 BENIGN PROSTATIC HYPERPLASIA WITH LOWER URINARY TRACT SYMPTOMS, SYMPTOM DETAILS UNSPECIFIED: ICD-10-CM

## 2024-05-01 DIAGNOSIS — E66.01 SEVERE OBESITY (BMI 35.0-39.9) WITH COMORBIDITY (HCC): ICD-10-CM

## 2024-05-01 DIAGNOSIS — R05.2 SUBACUTE COUGH: ICD-10-CM

## 2024-05-01 RX ORDER — FLUTICASONE PROPIONATE 50 MCG
1 SPRAY, SUSPENSION (ML) NASAL DAILY
Qty: 16 G | Refills: 2 | Status: ON HOLD | OUTPATIENT
Start: 2024-05-01

## 2024-05-01 RX ORDER — BENZONATATE 100 MG/1
100 CAPSULE ORAL 3 TIMES DAILY PRN
Qty: 30 CAPSULE | Refills: 0 | Status: ON HOLD | OUTPATIENT
Start: 2024-05-01 | End: 2024-05-11

## 2024-05-01 SDOH — ECONOMIC STABILITY: FOOD INSECURITY: WITHIN THE PAST 12 MONTHS, THE FOOD YOU BOUGHT JUST DIDN'T LAST AND YOU DIDN'T HAVE MONEY TO GET MORE.: NEVER TRUE

## 2024-05-01 SDOH — ECONOMIC STABILITY: INCOME INSECURITY: HOW HARD IS IT FOR YOU TO PAY FOR THE VERY BASICS LIKE FOOD, HOUSING, MEDICAL CARE, AND HEATING?: NOT HARD AT ALL

## 2024-05-01 SDOH — ECONOMIC STABILITY: FOOD INSECURITY: WITHIN THE PAST 12 MONTHS, YOU WORRIED THAT YOUR FOOD WOULD RUN OUT BEFORE YOU GOT MONEY TO BUY MORE.: NEVER TRUE

## 2024-05-01 ASSESSMENT — ENCOUNTER SYMPTOMS
BLOOD IN STOOL: 0
COUGH: 1
ABDOMINAL PAIN: 0
VOMITING: 0
SHORTNESS OF BREATH: 0
NAUSEA: 0
SORE THROAT: 0

## 2024-05-01 NOTE — PROGRESS NOTES
coronary artery disease include male gender and obesity. Past treatments include diuretics. The current treatment provides significant improvement. There are no compliance problems.        Review of Systems   Constitutional:  Negative for fatigue and fever.   HENT:  Negative for nosebleeds and sore throat.    Respiratory:  Positive for cough. Negative for shortness of breath.    Cardiovascular:  Negative for chest pain, palpitations and leg swelling.   Gastrointestinal:  Negative for abdominal pain, blood in stool, nausea and vomiting.   Neurological:  Negative for dizziness and weakness.          Objective   Physical Exam  Constitutional:       Appearance: Normal appearance.   HENT:      Head: Normocephalic and atraumatic.      Nose: Congestion present.      Mouth/Throat:      Mouth: Mucous membranes are moist.      Pharynx: Oropharynx is clear. No oropharyngeal exudate or posterior oropharyngeal erythema.   Eyes:      General: No scleral icterus.     Conjunctiva/sclera: Conjunctivae normal.   Cardiovascular:      Rate and Rhythm: Normal rate and regular rhythm.      Pulses: Normal pulses.      Heart sounds: Normal heart sounds.   Pulmonary:      Effort: Pulmonary effort is normal.      Breath sounds: Normal breath sounds.   Musculoskeletal:         General: No swelling.   Skin:     General: Skin is warm and dry.   Neurological:      Mental Status: He is alert and oriented to person, place, and time. Mental status is at baseline.   Psychiatric:         Mood and Affect: Mood normal.         Behavior: Behavior normal.              An electronic signature was used to authenticate this note.    --GADIEL JACKSON MD

## 2024-05-02 ENCOUNTER — APPOINTMENT (OUTPATIENT)
Dept: CT IMAGING | Age: 79
DRG: 871 | End: 2024-05-02
Payer: MEDICARE

## 2024-05-02 ENCOUNTER — APPOINTMENT (OUTPATIENT)
Dept: GENERAL RADIOLOGY | Age: 79
DRG: 871 | End: 2024-05-02
Payer: MEDICARE

## 2024-05-02 ENCOUNTER — HOSPITAL ENCOUNTER (OUTPATIENT)
Dept: GENERAL RADIOLOGY | Age: 79
Discharge: HOME | End: 2024-05-02
Attending: INTERNAL MEDICINE
Payer: MEDICARE

## 2024-05-02 ENCOUNTER — HOSPITAL ENCOUNTER (INPATIENT)
Age: 79
LOS: 4 days | Discharge: HOME | DRG: 871 | End: 2024-05-06
Attending: INTERNAL MEDICINE | Admitting: INTERNAL MEDICINE
Payer: MEDICARE

## 2024-05-02 ENCOUNTER — HOSPITAL ENCOUNTER (OUTPATIENT)
Age: 79
Discharge: HOME | End: 2024-05-02
Payer: MEDICARE

## 2024-05-02 DIAGNOSIS — R09.02 HYPOXIA: Primary | ICD-10-CM

## 2024-05-02 DIAGNOSIS — J18.9 PNEUMONIA OF BOTH LUNGS DUE TO INFECTIOUS ORGANISM, UNSPECIFIED PART OF LUNG: ICD-10-CM

## 2024-05-02 DIAGNOSIS — M79.604 BILATERAL LEG PAIN: ICD-10-CM

## 2024-05-02 DIAGNOSIS — M17.12 OSTEOARTHRITIS OF LEFT KNEE, UNSPECIFIED OSTEOARTHRITIS TYPE: ICD-10-CM

## 2024-05-02 DIAGNOSIS — R60.9 EDEMA: ICD-10-CM

## 2024-05-02 DIAGNOSIS — R05.2 SUBACUTE COUGH: ICD-10-CM

## 2024-05-02 DIAGNOSIS — M79.605 BILATERAL LEG PAIN: ICD-10-CM

## 2024-05-02 DIAGNOSIS — R29.898 BILATERAL LEG WEAKNESS: ICD-10-CM

## 2024-05-02 DIAGNOSIS — M16.12 ARTHRITIS OF LEFT HIP: ICD-10-CM

## 2024-05-02 DIAGNOSIS — M16.11 PRIMARY OSTEOARTHRITIS OF RIGHT HIP: Primary | ICD-10-CM

## 2024-05-02 PROBLEM — J15.9 PNEUMONIA, BACTERIAL: Status: ACTIVE | Noted: 2024-05-02

## 2024-05-02 LAB
ALBUMIN SERPL-MCNC: 3.8 G/DL (ref 3.4–5)
ALBUMIN/GLOB SERPL: 1.4 {RATIO} (ref 1.1–2.2)
ALP SERPL-CCNC: 42 U/L (ref 40–129)
ALT SERPL-CCNC: 23 U/L (ref 10–40)
ANION GAP SERPL CALCULATED.3IONS-SCNC: 9 MMOL/L (ref 3–16)
AST SERPL-CCNC: 23 U/L (ref 15–37)
BASOPHILS # BLD: 0 K/UL (ref 0–0.2)
BASOPHILS NFR BLD: 0.3 %
BILIRUB SERPL-MCNC: 1.3 MG/DL (ref 0–1)
BUN SERPL-MCNC: 15 MG/DL (ref 7–20)
CALCIUM SERPL-MCNC: 8.5 MG/DL (ref 8.3–10.6)
CHLORIDE SERPL-SCNC: 100 MMOL/L (ref 99–110)
CO2 SERPL-SCNC: 27 MMOL/L (ref 21–32)
CREAT SERPL-MCNC: 0.9 MG/DL (ref 0.8–1.3)
DEPRECATED RDW RBC AUTO: 15.6 % (ref 12.4–15.4)
EOSINOPHIL # BLD: 0 K/UL (ref 0–0.6)
EOSINOPHIL NFR BLD: 0.5 %
FLUAV RNA UPPER RESP QL NAA+PROBE: NEGATIVE
FLUBV AG NPH QL: NEGATIVE
GFR SERPLBLD CREATININE-BSD FMLA CKD-EPI: 88 ML/MIN/{1.73_M2}
GLUCOSE SERPL-MCNC: 106 MG/DL (ref 70–99)
HCT VFR BLD AUTO: 33.4 % (ref 40.5–52.5)
HGB BLD-MCNC: 11.8 G/DL (ref 13.5–17.5)
LACTATE BLDV-SCNC: 1.2 MMOL/L (ref 0.4–2)
LYMPHOCYTES # BLD: 0.4 K/UL (ref 1–5.1)
LYMPHOCYTES NFR BLD: 4.2 %
MCH RBC QN AUTO: 35.4 PG (ref 26–34)
MCHC RBC AUTO-ENTMCNC: 35.3 G/DL (ref 31–36)
MCV RBC AUTO: 100.4 FL (ref 80–100)
MONOCYTES # BLD: 0.6 K/UL (ref 0–1.3)
MONOCYTES NFR BLD: 7.2 %
NEUTROPHILS # BLD: 7.6 K/UL (ref 1.7–7.7)
NEUTROPHILS NFR BLD: 87.8 %
NT-PROBNP SERPL-MCNC: 527 PG/ML (ref 0–449)
PLATELET # BLD AUTO: 245 K/UL (ref 135–450)
PMV BLD AUTO: 7.5 FL (ref 5–10.5)
POTASSIUM SERPL-SCNC: 3.4 MMOL/L (ref 3.5–5.1)
PROT SERPL-MCNC: 6.5 G/DL (ref 6.4–8.2)
RBC # BLD AUTO: 3.32 M/UL (ref 4.2–5.9)
SARS-COV-2 RDRP RESP QL NAA+PROBE: NOT DETECTED
SODIUM SERPL-SCNC: 136 MMOL/L (ref 136–145)
TROPONIN, HIGH SENSITIVITY: 21 NG/L (ref 0–22)
WBC # BLD AUTO: 8.7 K/UL (ref 4–11)

## 2024-05-02 PROCEDURE — 93005 ELECTROCARDIOGRAM TRACING: CPT | Performed by: PHYSICIAN ASSISTANT

## 2024-05-02 PROCEDURE — 83605 ASSAY OF LACTIC ACID: CPT

## 2024-05-02 PROCEDURE — 71046 X-RAY EXAM CHEST 2 VIEWS: CPT

## 2024-05-02 PROCEDURE — 72125 CT NECK SPINE W/O DYE: CPT

## 2024-05-02 PROCEDURE — 73560 X-RAY EXAM OF KNEE 1 OR 2: CPT

## 2024-05-02 PROCEDURE — 96365 THER/PROPH/DIAG IV INF INIT: CPT

## 2024-05-02 PROCEDURE — 72170 X-RAY EXAM OF PELVIS: CPT

## 2024-05-02 PROCEDURE — 87040 BLOOD CULTURE FOR BACTERIA: CPT

## 2024-05-02 PROCEDURE — 84484 ASSAY OF TROPONIN QUANT: CPT

## 2024-05-02 PROCEDURE — 96375 TX/PRO/DX INJ NEW DRUG ADDON: CPT

## 2024-05-02 PROCEDURE — 1200000000 HC SEMI PRIVATE

## 2024-05-02 PROCEDURE — 87804 INFLUENZA ASSAY W/OPTIC: CPT

## 2024-05-02 PROCEDURE — 70450 CT HEAD/BRAIN W/O DYE: CPT

## 2024-05-02 PROCEDURE — 6360000002 HC RX W HCPCS: Performed by: PHYSICIAN ASSISTANT

## 2024-05-02 PROCEDURE — 2580000003 HC RX 258: Performed by: PHYSICIAN ASSISTANT

## 2024-05-02 PROCEDURE — 85025 COMPLETE CBC W/AUTO DIFF WBC: CPT

## 2024-05-02 PROCEDURE — 80053 COMPREHEN METABOLIC PANEL: CPT

## 2024-05-02 PROCEDURE — 99285 EMERGENCY DEPT VISIT HI MDM: CPT

## 2024-05-02 PROCEDURE — 84145 PROCALCITONIN (PCT): CPT

## 2024-05-02 PROCEDURE — 6370000000 HC RX 637 (ALT 250 FOR IP): Performed by: PHYSICIAN ASSISTANT

## 2024-05-02 PROCEDURE — 83880 ASSAY OF NATRIURETIC PEPTIDE: CPT

## 2024-05-02 PROCEDURE — 87635 SARS-COV-2 COVID-19 AMP PRB: CPT

## 2024-05-02 RX ORDER — ACETAMINOPHEN 325 MG/1
650 TABLET ORAL ONCE
Status: COMPLETED | OUTPATIENT
Start: 2024-05-02 | End: 2024-05-02

## 2024-05-02 RX ORDER — 0.9 % SODIUM CHLORIDE 0.9 %
1000 INTRAVENOUS SOLUTION INTRAVENOUS ONCE
Status: COMPLETED | OUTPATIENT
Start: 2024-05-02 | End: 2024-05-03

## 2024-05-02 RX ADMIN — ACETAMINOPHEN 650 MG: 325 TABLET ORAL at 21:54

## 2024-05-02 RX ADMIN — AZITHROMYCIN MONOHYDRATE 500 MG: 500 INJECTION, POWDER, LYOPHILIZED, FOR SOLUTION INTRAVENOUS at 21:59

## 2024-05-02 RX ADMIN — SODIUM CHLORIDE 1000 ML: 9 INJECTION, SOLUTION INTRAVENOUS at 23:48

## 2024-05-02 RX ADMIN — WATER 1000 MG: 1 INJECTION INTRAMUSCULAR; INTRAVENOUS; SUBCUTANEOUS at 21:54

## 2024-05-03 LAB
ANION GAP SERPL CALCULATED.3IONS-SCNC: 10 MMOL/L (ref 3–16)
BACTERIA URNS QL MICRO: ABNORMAL /HPF
BILIRUB UR QL STRIP.AUTO: NEGATIVE
BUN SERPL-MCNC: 14 MG/DL (ref 7–20)
CALCIUM SERPL-MCNC: 8.4 MG/DL (ref 8.3–10.6)
CHLORIDE SERPL-SCNC: 101 MMOL/L (ref 99–110)
CLARITY UR: CLEAR
CO2 SERPL-SCNC: 26 MMOL/L (ref 21–32)
COLOR UR: ABNORMAL
CREAT SERPL-MCNC: 0.8 MG/DL (ref 0.8–1.3)
EKG ATRIAL RATE: 102 BPM
EKG DIAGNOSIS: NORMAL
EKG P AXIS: 73 DEGREES
EKG P-R INTERVAL: 180 MS
EKG Q-T INTERVAL: 370 MS
EKG QRS DURATION: 138 MS
EKG QTC CALCULATION (BAZETT): 482 MS
EKG R AXIS: 64 DEGREES
EKG T AXIS: 14 DEGREES
EKG VENTRICULAR RATE: 102 BPM
EPI CELLS #/AREA URNS AUTO: 1 /HPF (ref 0–5)
GFR SERPLBLD CREATININE-BSD FMLA CKD-EPI: >90 ML/MIN/{1.73_M2}
GLUCOSE SERPL-MCNC: 110 MG/DL (ref 70–99)
GLUCOSE UR STRIP.AUTO-MCNC: NEGATIVE MG/DL
GRANULAR CASTS: PRESENT
HGB UR QL STRIP.AUTO: ABNORMAL
HYALINE CASTS #/AREA URNS AUTO: 2 /LPF (ref 0–8)
HYALINE CASTS: PRESENT
KETONES UR STRIP.AUTO-MCNC: 40 MG/DL
LEUKOCYTE ESTERASE UR QL STRIP.AUTO: ABNORMAL
MUCUS: PRESENT
NITRITE UR QL STRIP.AUTO: NEGATIVE
PH UR STRIP.AUTO: 5.5 [PH] (ref 5–8)
POTASSIUM SERPL-SCNC: 3.7 MMOL/L (ref 3.5–5.1)
PROCALCITONIN SERPL IA-MCNC: 0.09 NG/ML (ref 0–0.15)
PROT UR STRIP.AUTO-MCNC: 30 MG/DL
RBC CLUMPS #/AREA URNS AUTO: 3 /HPF (ref 0–4)
SODIUM SERPL-SCNC: 137 MMOL/L (ref 136–145)
SP GR UR STRIP.AUTO: 1.02 (ref 1–1.03)
UA COMPLETE W REFLEX CULTURE PNL UR: ABNORMAL
UA DIPSTICK W REFLEX MICRO PNL UR: YES
URN SPEC COLLECT METH UR: ABNORMAL
UROBILINOGEN UR STRIP-ACNC: 1 E.U./DL
WBC #/AREA URNS AUTO: 1 /HPF (ref 0–5)

## 2024-05-03 PROCEDURE — 1200000000 HC SEMI PRIVATE

## 2024-05-03 PROCEDURE — 97535 SELF CARE MNGMENT TRAINING: CPT

## 2024-05-03 PROCEDURE — 6360000002 HC RX W HCPCS: Performed by: INTERNAL MEDICINE

## 2024-05-03 PROCEDURE — 97166 OT EVAL MOD COMPLEX 45 MIN: CPT

## 2024-05-03 PROCEDURE — 93010 ELECTROCARDIOGRAM REPORT: CPT | Performed by: INTERNAL MEDICINE

## 2024-05-03 PROCEDURE — 6360000002 HC RX W HCPCS: Performed by: STUDENT IN AN ORGANIZED HEALTH CARE EDUCATION/TRAINING PROGRAM

## 2024-05-03 PROCEDURE — 6370000000 HC RX 637 (ALT 250 FOR IP): Performed by: INTERNAL MEDICINE

## 2024-05-03 PROCEDURE — 6370000000 HC RX 637 (ALT 250 FOR IP): Performed by: STUDENT IN AN ORGANIZED HEALTH CARE EDUCATION/TRAINING PROGRAM

## 2024-05-03 PROCEDURE — 97530 THERAPEUTIC ACTIVITIES: CPT

## 2024-05-03 PROCEDURE — 94761 N-INVAS EAR/PLS OXIMETRY MLT: CPT

## 2024-05-03 PROCEDURE — 2580000003 HC RX 258: Performed by: INTERNAL MEDICINE

## 2024-05-03 PROCEDURE — 97116 GAIT TRAINING THERAPY: CPT

## 2024-05-03 PROCEDURE — 87449 NOS EACH ORGANISM AG IA: CPT

## 2024-05-03 PROCEDURE — 36415 COLL VENOUS BLD VENIPUNCTURE: CPT

## 2024-05-03 PROCEDURE — 97162 PT EVAL MOD COMPLEX 30 MIN: CPT

## 2024-05-03 PROCEDURE — 81001 URINALYSIS AUTO W/SCOPE: CPT

## 2024-05-03 PROCEDURE — 80048 BASIC METABOLIC PNL TOTAL CA: CPT

## 2024-05-03 PROCEDURE — 2700000000 HC OXYGEN THERAPY PER DAY

## 2024-05-03 RX ORDER — POLYETHYLENE GLYCOL 3350 17 G/17G
17 POWDER, FOR SOLUTION ORAL DAILY PRN
Status: DISCONTINUED | OUTPATIENT
Start: 2024-05-03 | End: 2024-05-06 | Stop reason: HOSPADM

## 2024-05-03 RX ORDER — POTASSIUM CHLORIDE 20 MEQ/1
40 TABLET, EXTENDED RELEASE ORAL PRN
Status: DISCONTINUED | OUTPATIENT
Start: 2024-05-03 | End: 2024-05-06 | Stop reason: HOSPADM

## 2024-05-03 RX ORDER — ONDANSETRON 4 MG/1
4 TABLET, ORALLY DISINTEGRATING ORAL EVERY 8 HOURS PRN
Status: DISCONTINUED | OUTPATIENT
Start: 2024-05-03 | End: 2024-05-06 | Stop reason: HOSPADM

## 2024-05-03 RX ORDER — AZITHROMYCIN 250 MG/1
TABLET, FILM COATED ORAL
Qty: 6 TABLET | Refills: 0 | Status: SHIPPED | OUTPATIENT
Start: 2024-05-03 | End: 2024-05-13

## 2024-05-03 RX ORDER — POTASSIUM CHLORIDE 7.45 MG/ML
10 INJECTION INTRAVENOUS PRN
Status: DISCONTINUED | OUTPATIENT
Start: 2024-05-03 | End: 2024-05-06 | Stop reason: HOSPADM

## 2024-05-03 RX ORDER — SODIUM CHLORIDE 9 MG/ML
INJECTION, SOLUTION INTRAVENOUS PRN
Status: DISCONTINUED | OUTPATIENT
Start: 2024-05-03 | End: 2024-05-06 | Stop reason: HOSPADM

## 2024-05-03 RX ORDER — CHLORAL HYDRATE 500 MG
1 CAPSULE ORAL DAILY
Status: DISCONTINUED | OUTPATIENT
Start: 2024-05-03 | End: 2024-05-03

## 2024-05-03 RX ORDER — ACETAMINOPHEN 325 MG/1
650 TABLET ORAL EVERY 6 HOURS PRN
Status: DISCONTINUED | OUTPATIENT
Start: 2024-05-03 | End: 2024-05-06 | Stop reason: HOSPADM

## 2024-05-03 RX ORDER — BENZONATATE 100 MG/1
100 CAPSULE ORAL 3 TIMES DAILY PRN
Status: DISCONTINUED | OUTPATIENT
Start: 2024-05-03 | End: 2024-05-03

## 2024-05-03 RX ORDER — GUAIFENESIN 600 MG/1
600 TABLET, EXTENDED RELEASE ORAL 2 TIMES DAILY
Status: DISCONTINUED | OUTPATIENT
Start: 2024-05-03 | End: 2024-05-06 | Stop reason: HOSPADM

## 2024-05-03 RX ORDER — HYDROXYUREA 500 MG/1
1000 CAPSULE ORAL DAILY
Status: DISCONTINUED | OUTPATIENT
Start: 2024-05-03 | End: 2024-05-06 | Stop reason: HOSPADM

## 2024-05-03 RX ORDER — ACETAMINOPHEN 650 MG/1
650 SUPPOSITORY RECTAL EVERY 6 HOURS PRN
Status: DISCONTINUED | OUTPATIENT
Start: 2024-05-03 | End: 2024-05-06 | Stop reason: HOSPADM

## 2024-05-03 RX ORDER — FUROSEMIDE 10 MG/ML
40 INJECTION INTRAMUSCULAR; INTRAVENOUS ONCE
Status: COMPLETED | OUTPATIENT
Start: 2024-05-03 | End: 2024-05-03

## 2024-05-03 RX ORDER — FERROUS SULFATE 325(65) MG
325 TABLET ORAL DAILY
Status: DISCONTINUED | OUTPATIENT
Start: 2024-05-03 | End: 2024-05-06 | Stop reason: HOSPADM

## 2024-05-03 RX ORDER — TAMSULOSIN HYDROCHLORIDE 0.4 MG/1
0.4 CAPSULE ORAL DAILY
Status: DISCONTINUED | OUTPATIENT
Start: 2024-05-03 | End: 2024-05-06 | Stop reason: HOSPADM

## 2024-05-03 RX ORDER — ENOXAPARIN SODIUM 100 MG/ML
30 INJECTION SUBCUTANEOUS 2 TIMES DAILY
Status: DISCONTINUED | OUTPATIENT
Start: 2024-05-03 | End: 2024-05-06 | Stop reason: HOSPADM

## 2024-05-03 RX ORDER — GUAIFENESIN/DEXTROMETHORPHAN 100-10MG/5
5 SYRUP ORAL EVERY 4 HOURS PRN
Status: DISCONTINUED | OUTPATIENT
Start: 2024-05-03 | End: 2024-05-06 | Stop reason: HOSPADM

## 2024-05-03 RX ORDER — SODIUM CHLORIDE 0.9 % (FLUSH) 0.9 %
5-40 SYRINGE (ML) INJECTION EVERY 12 HOURS SCHEDULED
Status: DISCONTINUED | OUTPATIENT
Start: 2024-05-03 | End: 2024-05-06 | Stop reason: HOSPADM

## 2024-05-03 RX ORDER — TRIAMTERENE AND HYDROCHLOROTHIAZIDE 37.5; 25 MG/1; MG/1
0.5 TABLET ORAL DAILY
Status: DISCONTINUED | OUTPATIENT
Start: 2024-05-03 | End: 2024-05-06 | Stop reason: HOSPADM

## 2024-05-03 RX ORDER — ONDANSETRON 2 MG/ML
4 INJECTION INTRAMUSCULAR; INTRAVENOUS EVERY 6 HOURS PRN
Status: DISCONTINUED | OUTPATIENT
Start: 2024-05-03 | End: 2024-05-06 | Stop reason: HOSPADM

## 2024-05-03 RX ORDER — SODIUM CHLORIDE 9 MG/ML
INJECTION, SOLUTION INTRAVENOUS CONTINUOUS
Status: DISCONTINUED | OUTPATIENT
Start: 2024-05-03 | End: 2024-05-03

## 2024-05-03 RX ORDER — SODIUM CHLORIDE 0.9 % (FLUSH) 0.9 %
5-40 SYRINGE (ML) INJECTION PRN
Status: DISCONTINUED | OUTPATIENT
Start: 2024-05-03 | End: 2024-05-06 | Stop reason: HOSPADM

## 2024-05-03 RX ORDER — MAGNESIUM SULFATE IN WATER 40 MG/ML
2000 INJECTION, SOLUTION INTRAVENOUS PRN
Status: DISCONTINUED | OUTPATIENT
Start: 2024-05-03 | End: 2024-05-06 | Stop reason: HOSPADM

## 2024-05-03 RX ADMIN — GUAIFENESIN 600 MG: 600 TABLET ORAL at 19:55

## 2024-05-03 RX ADMIN — FERROUS SULFATE TAB 325 MG (65 MG ELEMENTAL FE) 325 MG: 325 (65 FE) TAB at 07:31

## 2024-05-03 RX ADMIN — AZITHROMYCIN MONOHYDRATE 500 MG: 500 INJECTION, POWDER, LYOPHILIZED, FOR SOLUTION INTRAVENOUS at 21:42

## 2024-05-03 RX ADMIN — ENOXAPARIN SODIUM 30 MG: 100 INJECTION SUBCUTANEOUS at 07:32

## 2024-05-03 RX ADMIN — CEFTRIAXONE SODIUM 2000 MG: 2 INJECTION, POWDER, FOR SOLUTION INTRAMUSCULAR; INTRAVENOUS at 07:41

## 2024-05-03 RX ADMIN — TAMSULOSIN HYDROCHLORIDE 0.4 MG: 0.4 CAPSULE ORAL at 07:31

## 2024-05-03 RX ADMIN — SODIUM CHLORIDE: 9 INJECTION, SOLUTION INTRAVENOUS at 01:09

## 2024-05-03 RX ADMIN — DEXTROMETHORPHAN HYDROBROMIDE, GUAIFENESIN 5 ML: 10; 100 LIQUID ORAL at 11:42

## 2024-05-03 RX ADMIN — ACETAMINOPHEN 325MG 650 MG: 325 TABLET ORAL at 08:37

## 2024-05-03 RX ADMIN — BENZONATATE 100 MG: 100 CAPSULE ORAL at 04:26

## 2024-05-03 RX ADMIN — GUAIFENESIN 600 MG: 600 TABLET ORAL at 11:42

## 2024-05-03 RX ADMIN — DEXTROMETHORPHAN HYDROBROMIDE, GUAIFENESIN 5 ML: 10; 100 LIQUID ORAL at 19:55

## 2024-05-03 RX ADMIN — FUROSEMIDE 40 MG: 10 INJECTION, SOLUTION INTRAMUSCULAR; INTRAVENOUS at 12:26

## 2024-05-03 RX ADMIN — HYDROXYUREA 1000 MG: 500 CAPSULE ORAL at 07:33

## 2024-05-03 RX ADMIN — ENOXAPARIN SODIUM 30 MG: 100 INJECTION SUBCUTANEOUS at 21:35

## 2024-05-03 RX ADMIN — TRIAMTERENE AND HYDROCHLOROTHIAZIDE 0.5 TABLET: 25; 37.5 TABLET ORAL at 07:32

## 2024-05-03 RX ADMIN — DEXTROMETHORPHAN HYDROBROMIDE, GUAIFENESIN 5 ML: 10; 100 LIQUID ORAL at 15:28

## 2024-05-03 RX ADMIN — POTASSIUM CHLORIDE 40 MEQ: 1500 TABLET, EXTENDED RELEASE ORAL at 04:33

## 2024-05-03 RX ADMIN — SODIUM CHLORIDE, PRESERVATIVE FREE 10 ML: 5 INJECTION INTRAVENOUS at 19:55

## 2024-05-03 RX ADMIN — ACETAMINOPHEN 325MG 650 MG: 325 TABLET ORAL at 20:20

## 2024-05-03 ASSESSMENT — LIFESTYLE VARIABLES: HOW MANY STANDARD DRINKS CONTAINING ALCOHOL DO YOU HAVE ON A TYPICAL DAY: PATIENT DOES NOT DRINK

## 2024-05-03 NOTE — DISCHARGE INSTR - COC
Assessment Score:  Readmission Risk              Risk of Unplanned Readmission:  10           Discharging to Facility/ Agency   Name: Discharging to Facility/ Agency   Name: [facility]  Address:  [address] [place] [address]  Phone:  [phone]  Fax:  [phone]     / signature: Electronically signed by Mis Hughes RN on 5/6/24 at 3:39 PM EDT    PHYSICIAN SECTION    Prognosis: Good    Condition at Discharge: Stable    Rehab Potential (if transferring to Rehab): Good    Recommended Labs or Other Treatments After Discharge:   - lab work in 5 days  - wean O2 as  tolerated      Physician Certification: I certify the above information and transfer of Shadi Carreon  is necessary for the continuing treatment of the diagnosis listed and that he requires Home Care for greater 30 days.     Update Admission H&P: No change in H&P    PHYSICIAN SIGNATURE:  Electronically signed by Zenon Victoria MD on 5/6/24 at 1:58 PM EDT

## 2024-05-03 NOTE — PLAN OF CARE
Problem: Discharge Planning  Goal: Discharge to home or other facility with appropriate resources  5/3/2024 0906 by Everton Velazquez, RN  Outcome: Progressing     Problem: Safety - Adult  Goal: Free from fall injury  5/3/2024 0906 by Everton Velazquez, RN  Outcome: Progressing

## 2024-05-03 NOTE — ED PROVIDER NOTES
[facility] EMERGENCY DEPARTMENT  EMERGENCY DEPARTMENT ENCOUNTER        Pt Name: Shadi Carreon  MRN: [id number]  Birthdate 1945  Date of evaluation: 5/2/2024  Provider: AMY Jhaveri  PCP: Michel Gunter MD  Note Started: 11:58 PM EDT 5/2/24      ANSHUL. I have evaluated this patient.        CHIEF COMPLAINT       Chief Complaint   Patient presents with    Fall     Fall x2 today. Denies dizziness, lighthead, LOC. Denies pain. Abrasion to R head and L knee.        HISTORY OF PRESENT ILLNESS: 1 or more Elements     History from : Patient and Family daughter    Limitations to history : None    Shadi Carreon is a 78 y.o. male who presents via EMS from home where he lives with his wife after a fall.  He feels like for the past 2 weeks has gotten progressively weak in his bilateral legs.  To the point tonight where he can get up off the toilet seat.  He had a second fall today.  Initially fell on the sidewalk and sustained abrasions to his left knee and right forehead and then fell trying to get off the toilet with his wife trying to help him.  He grabbed the grab bar bending and his legs went sideways.  He uses a quad cane typically to get around.  He has been sick for 3 weeks with a cough productive with yellow-green phlegm.  Subjective fevers.  He completed doxycycline last month and got started on azithromycin today after he had an outpatient chest x-ray.  Has been taking some Tessalon Perles.  He smoked greater than 50 years ago no prior history of asthma or COPD.  He had bilateral hip replacements remotely.  No vomiting no abdominal pain.    Nursing Notes were all reviewed and agreed with or any disagreements were addressed in the HPI.    REVIEW OF SYSTEMS :      Review of Systems   Constitutional:  Positive for chills. Negative for fever.   Respiratory:  Positive for cough, shortness of breath and wheezing.    Cardiovascular:  Negative for leg swelling.   Gastrointestinal:

## 2024-05-03 NOTE — FLOWSHEET NOTE
4 Eyes Skin Assessment     NAME:  Shadi Carreon  YOB: 1945  MEDICAL RECORD NUMBER:  [id number]    The patient is being assessed for  Admission    I agree that at least one RN has performed a thorough Head to Toe Skin Assessment on the patient. ALL assessment sites listed below have been assessed.      Areas assessed by both nurses:    Head, Face, Ears, Shoulders, Back, Chest, Arms, Elbows, Hands, Sacrum. Buttock, Coccyx, Ischium, Legs. Feet and Heels, and Under Medical Devices         Does the Patient have a Wound? Yes  Abrasions on left knee  Abrasion on right forehead       Oswaldo Prevention initiated by RN: Yes  Wound Care Orders initiated by RN: Yes    Pressure Injury (Stage 3,4, Unstageable, DTI, NWPT, and Complex wounds) if present, place Wound referral order by RN under : No    New Ostomies, if present place, Ostomy referral order under : No     Nurse 1 eSignature: Electronically signed by Joe Frost RN on 5/3/24 at 1:24 AM EDT    **SHARE this note so that the co-signing nurse can place an eSignature**    Nurse 2 eSignature: Electronically signed by Danielle Mclaughlin RN on 5/3/24 at 5:47 AM EDT

## 2024-05-03 NOTE — ED NOTES
ED SBAR report provider to BETH Diaz. Patient to be transported to Room 4121 via stretcher by ED tech.Patient transported with bedside cardiac monitor. IV site clean, dry, and intact. Updated patient and family (Brigitte, daughter) on plan of care.

## 2024-05-03 NOTE — PLAN OF CARE
Problem: Discharge Planning  Goal: Discharge to home or other facility with appropriate resources  Outcome: Progressing  Flowsheets (Taken 5/3/2024 0142)  Discharge to home or other facility with appropriate resources:   Identify barriers to discharge with patient and caregiver   Identify discharge learning needs (meds, wound care, etc)   Refer to discharge planning if patient needs post-hospital services based on physician order or complex needs related to functional status, cognitive ability or social support system     Problem: Safety - Adult  Goal: Free from fall injury  Outcome: Progressing     Problem: ABCDS Injury Assessment  Goal: Absence of physical injury  Outcome: Progressing

## 2024-05-03 NOTE — H&P
antitussives  PT/OT evaluation and treatment.  Resume home meds as appropriate.  Replace potassium.  SQ Lovenox for DVT prophylaxis.  CODE STATUS: Total support    Physical Exam Performed:      BP (!) 147/75   Pulse 93   Temp 100.4 °F (38 °C) (Oral)   Resp 23   Ht 1.829 m (6')   Wt 129 kg (284 lb 6.3 oz)   SpO2 96%   BMI 38.57 kg/m²     General appearance: Obese, no apparent distress, appears stated age and cooperative.  HEENT:  Pupils equal, round, and reactive to light. Conjunctivae/corneas clear.  Respiratory:  Normal respiratory effort. Clear to auscultation, bilaterally without Rales/Wheezes/Rhonchi.  Cardiovascular:  Regular rate and rhythm with normal S1/S2 without murmurs, rubs or gallops.  Abdomen:  Soft, non-tender, non-distended with normal bowel sounds.  Musculoskelatal:  No clubbing, cyanosis with +2 bilateral edema with chronic venous insufficiency and skin venous dermatitis changes.  Full range of motion without deformity.  Neurologic:  Neurovascularly intact without any focal sensory/motor deficits. Cranial nerves: II-XII intact, grossly non-focal.  Psychiatric:  Alert and oriented, thought content appropriate, normal insight  Skin:  Skin color, texture, turgor normal.  No rashes or lesions.  Capillary Refill:  Brisk,< 3 seconds   Peripheral Pulses:  +2 palpable, equal bilaterally         --------------------------------------------------------------------------------------------------------------------------------------------------------------------    Imaging:     CT HEAD WO CONTRAST    Result Date: 5/2/2024  EXAMINATION: CT OF THE CERVICAL SPINE WITHOUT CONTRAST; CT OF THE HEAD WITHOUT CONTRAST 5/2/2024 9:11 pm TECHNIQUE: CT of the cervical spine was performed without the administration of intravenous contrast. Multiplanar reformatted images are provided for review. Automated exposure control, iterative reconstruction, and/or weight based adjustment of the mA/kV was utilized to reduce the

## 2024-05-04 ENCOUNTER — APPOINTMENT (OUTPATIENT)
Age: 79
DRG: 871 | End: 2024-05-04
Attending: STUDENT IN AN ORGANIZED HEALTH CARE EDUCATION/TRAINING PROGRAM
Payer: MEDICARE

## 2024-05-04 LAB
ANION GAP SERPL CALCULATED.3IONS-SCNC: 8 MMOL/L (ref 3–16)
BASOPHILS # BLD: 0 K/UL (ref 0–0.2)
BASOPHILS NFR BLD: 0.4 %
BUN SERPL-MCNC: 12 MG/DL (ref 7–20)
CALCIUM SERPL-MCNC: 8.4 MG/DL (ref 8.3–10.6)
CHLORIDE SERPL-SCNC: 99 MMOL/L (ref 99–110)
CO2 SERPL-SCNC: 30 MMOL/L (ref 21–32)
CREAT SERPL-MCNC: 0.8 MG/DL (ref 0.8–1.3)
DEPRECATED RDW RBC AUTO: 15.4 % (ref 12.4–15.4)
ECHO AO ROOT DIAM: 4.1 CM
ECHO AO ROOT INDEX: 1.68 CM/M2
ECHO AV PEAK GRADIENT: 9 MMHG
ECHO AV PEAK VELOCITY: 1.5 M/S
ECHO AV VELOCITY RATIO: 0.93
ECHO BSA: 2.52 M2
ECHO LA AREA 2C: 19.3 CM2
ECHO LA AREA 4C: 20.1 CM2
ECHO LA DIAMETER INDEX: 1.84 CM/M2
ECHO LA DIAMETER: 4.5 CM
ECHO LA MAJOR AXIS: 6.6 CM
ECHO LA MINOR AXIS: 6 CM
ECHO LA TO AORTIC ROOT RATIO: 1.1
ECHO LA VOL BP: 53 ML (ref 18–58)
ECHO LA VOL MOD A2C: 51 ML (ref 18–58)
ECHO LA VOL MOD A4C: 49 ML (ref 18–58)
ECHO LA VOL/BSA BIPLANE: 22 ML/M2 (ref 16–34)
ECHO LA VOLUME INDEX MOD A2C: 21 ML/M2 (ref 16–34)
ECHO LA VOLUME INDEX MOD A4C: 20 ML/M2 (ref 16–34)
ECHO LV E' LATERAL VELOCITY: 6 CM/S
ECHO LV E' SEPTAL VELOCITY: 6 CM/S
ECHO LV FRACTIONAL SHORTENING: 44 % (ref 28–44)
ECHO LV INTERNAL DIMENSION DIASTOLE INDEX: 2.13 CM/M2
ECHO LV INTERNAL DIMENSION DIASTOLIC: 5.2 CM (ref 4.2–5.9)
ECHO LV INTERNAL DIMENSION SYSTOLIC INDEX: 1.19 CM/M2
ECHO LV INTERNAL DIMENSION SYSTOLIC: 2.9 CM
ECHO LV IVSD: 1.4 CM (ref 0.6–1)
ECHO LV MASS 2D: 278.4 G (ref 88–224)
ECHO LV MASS INDEX 2D: 114.1 G/M2 (ref 49–115)
ECHO LV POSTERIOR WALL DIASTOLIC: 1.2 CM (ref 0.6–1)
ECHO LV RELATIVE WALL THICKNESS RATIO: 0.46
ECHO LVOT PEAK GRADIENT: 8 MMHG
ECHO LVOT PEAK VELOCITY: 1.4 M/S
ECHO MV A VELOCITY: 0.91 M/S
ECHO MV E DECELERATION TIME (DT): 158 MS
ECHO MV E VELOCITY: 0.63 M/S
ECHO MV E/A RATIO: 0.69
ECHO MV E/E' LATERAL: 10.5
ECHO MV E/E' RATIO (AVERAGED): 10.5
ECHO PV MAX VELOCITY: 1.3 M/S
ECHO PV PEAK GRADIENT: 7 MMHG
ECHO RA AREA 4C: 12.7 CM2
ECHO RA END SYSTOLIC VOLUME APICAL 4 CHAMBER INDEX BSA: 11 ML/M2
ECHO RA VOLUME: 28 ML
ECHO RV FREE WALL PEAK S': 13 CM/S
ECHO RV INTERNAL DIMENSION: 3 CM
ECHO RV TAPSE: 2.4 CM (ref 1.7–?)
ECHO TV REGURGITANT MAX VELOCITY: 3.02 M/S
ECHO TV REGURGITANT PEAK GRADIENT: 36 MMHG
EOSINOPHIL # BLD: 0.2 K/UL (ref 0–0.6)
EOSINOPHIL NFR BLD: 2.1 %
GFR SERPLBLD CREATININE-BSD FMLA CKD-EPI: >90 ML/MIN/{1.73_M2}
GLUCOSE SERPL-MCNC: 97 MG/DL (ref 70–99)
HCT VFR BLD AUTO: 30.2 % (ref 40.5–52.5)
HGB BLD-MCNC: 10.9 G/DL (ref 13.5–17.5)
LEGIONELLA AG UR QL: NORMAL
LYMPHOCYTES # BLD: 0.6 K/UL (ref 1–5.1)
LYMPHOCYTES NFR BLD: 7.9 %
MCH RBC QN AUTO: 35.8 PG (ref 26–34)
MCHC RBC AUTO-ENTMCNC: 36.1 G/DL (ref 31–36)
MCV RBC AUTO: 98.9 FL (ref 80–100)
MONOCYTES # BLD: 0.7 K/UL (ref 0–1.3)
MONOCYTES NFR BLD: 8.9 %
NEUTROPHILS # BLD: 6 K/UL (ref 1.7–7.7)
NEUTROPHILS NFR BLD: 80.7 %
PLATELET # BLD AUTO: 209 K/UL (ref 135–450)
PMV BLD AUTO: 7.5 FL (ref 5–10.5)
POTASSIUM SERPL-SCNC: 3.6 MMOL/L (ref 3.5–5.1)
RBC # BLD AUTO: 3.06 M/UL (ref 4.2–5.9)
S PNEUM AG UR QL: NORMAL
SODIUM SERPL-SCNC: 137 MMOL/L (ref 136–145)
WBC # BLD AUTO: 7.4 K/UL (ref 4–11)

## 2024-05-04 PROCEDURE — C8929 TTE W OR WO FOL WCON,DOPPLER: HCPCS

## 2024-05-04 PROCEDURE — 6360000004 HC RX CONTRAST MEDICATION: Performed by: STUDENT IN AN ORGANIZED HEALTH CARE EDUCATION/TRAINING PROGRAM

## 2024-05-04 PROCEDURE — 6370000000 HC RX 637 (ALT 250 FOR IP): Performed by: STUDENT IN AN ORGANIZED HEALTH CARE EDUCATION/TRAINING PROGRAM

## 2024-05-04 PROCEDURE — 6360000002 HC RX W HCPCS: Performed by: INTERNAL MEDICINE

## 2024-05-04 PROCEDURE — 85025 COMPLETE CBC W/AUTO DIFF WBC: CPT

## 2024-05-04 PROCEDURE — 1200000000 HC SEMI PRIVATE

## 2024-05-04 PROCEDURE — 80048 BASIC METABOLIC PNL TOTAL CA: CPT

## 2024-05-04 PROCEDURE — 36415 COLL VENOUS BLD VENIPUNCTURE: CPT

## 2024-05-04 PROCEDURE — 93306 TTE W/DOPPLER COMPLETE: CPT | Performed by: INTERNAL MEDICINE

## 2024-05-04 PROCEDURE — 2580000003 HC RX 258: Performed by: INTERNAL MEDICINE

## 2024-05-04 PROCEDURE — 6370000000 HC RX 637 (ALT 250 FOR IP): Performed by: INTERNAL MEDICINE

## 2024-05-04 PROCEDURE — A4216 STERILE WATER/SALINE, 10 ML: HCPCS | Performed by: STUDENT IN AN ORGANIZED HEALTH CARE EDUCATION/TRAINING PROGRAM

## 2024-05-04 PROCEDURE — 6360000002 HC RX W HCPCS: Performed by: STUDENT IN AN ORGANIZED HEALTH CARE EDUCATION/TRAINING PROGRAM

## 2024-05-04 PROCEDURE — 2580000003 HC RX 258: Performed by: STUDENT IN AN ORGANIZED HEALTH CARE EDUCATION/TRAINING PROGRAM

## 2024-05-04 RX ORDER — FUROSEMIDE 10 MG/ML
40 INJECTION INTRAMUSCULAR; INTRAVENOUS ONCE
Status: COMPLETED | OUTPATIENT
Start: 2024-05-04 | End: 2024-05-04

## 2024-05-04 RX ADMIN — GUAIFENESIN 600 MG: 600 TABLET ORAL at 09:39

## 2024-05-04 RX ADMIN — FERROUS SULFATE TAB 325 MG (65 MG ELEMENTAL FE) 325 MG: 325 (65 FE) TAB at 09:40

## 2024-05-04 RX ADMIN — CEFTRIAXONE SODIUM 2000 MG: 2 INJECTION, POWDER, FOR SOLUTION INTRAMUSCULAR; INTRAVENOUS at 09:46

## 2024-05-04 RX ADMIN — SODIUM CHLORIDE 1 ML: 9 INJECTION INTRAMUSCULAR; INTRAVENOUS; SUBCUTANEOUS at 08:22

## 2024-05-04 RX ADMIN — ENOXAPARIN SODIUM 30 MG: 100 INJECTION SUBCUTANEOUS at 09:38

## 2024-05-04 RX ADMIN — HYDROXYUREA 1000 MG: 500 CAPSULE ORAL at 10:41

## 2024-05-04 RX ADMIN — ENOXAPARIN SODIUM 30 MG: 100 INJECTION SUBCUTANEOUS at 20:56

## 2024-05-04 RX ADMIN — SODIUM CHLORIDE, PRESERVATIVE FREE 10 ML: 5 INJECTION INTRAVENOUS at 09:39

## 2024-05-04 RX ADMIN — GUAIFENESIN 600 MG: 600 TABLET ORAL at 20:56

## 2024-05-04 RX ADMIN — FUROSEMIDE 40 MG: 10 INJECTION, SOLUTION INTRAMUSCULAR; INTRAVENOUS at 09:39

## 2024-05-04 RX ADMIN — DEXTROMETHORPHAN HYDROBROMIDE, GUAIFENESIN 5 ML: 10; 100 LIQUID ORAL at 04:26

## 2024-05-04 RX ADMIN — SODIUM CHLORIDE, PRESERVATIVE FREE 10 ML: 5 INJECTION INTRAVENOUS at 20:49

## 2024-05-04 RX ADMIN — AZITHROMYCIN MONOHYDRATE 500 MG: 500 INJECTION, POWDER, LYOPHILIZED, FOR SOLUTION INTRAVENOUS at 20:55

## 2024-05-04 RX ADMIN — TRIAMTERENE AND HYDROCHLOROTHIAZIDE 0.5 TABLET: 25; 37.5 TABLET ORAL at 09:39

## 2024-05-04 RX ADMIN — TAMSULOSIN HYDROCHLORIDE 0.4 MG: 0.4 CAPSULE ORAL at 09:39

## 2024-05-04 RX ADMIN — DEXTROMETHORPHAN HYDROBROMIDE, GUAIFENESIN 5 ML: 10; 100 LIQUID ORAL at 23:02

## 2024-05-04 NOTE — PLAN OF CARE
Problem: Discharge Planning  Goal: Discharge to home or other facility with appropriate resources  Outcome: Progressing     Problem: Safety - Adult  Goal: Free from fall injury  Outcome: Progressing     Problem: ABCDS Injury Assessment  Goal: Absence of physical injury  Outcome: Progressing     Problem: Nutrition Deficit:  Goal: Optimize nutritional status  5/3/2024 2324 by Bri Galdamez, RN  Outcome: Progressing  5/3/2024 1209 by Nidia Strong, MS, RD, LD  Outcome: Progressing  Flowsheets (Taken 5/3/2024 1209)  Nutrient intake appropriate for improving, restoring, or maintaining nutritional needs:   Assess nutritional status and recommend course of action   Recommend appropriate diets, oral nutritional supplements, and vitamin/mineral supplements   Monitor oral intake, labs, and treatment plans     Problem: Respiratory - Adult  Goal: Achieves optimal ventilation and oxygenation  Outcome: Progressing     Problem: Cardiovascular - Adult  Goal: Maintains optimal cardiac output and hemodynamic stability  Outcome: Progressing     Problem: Skin/Tissue Integrity - Adult  Goal: Skin integrity remains intact  Outcome: Progressing     Problem: Musculoskeletal - Adult  Goal: Return mobility to safest level of function  Outcome: Progressing     Problem: Gastrointestinal - Adult  Goal: Minimal or absence of nausea and vomiting  Outcome: Progressing

## 2024-05-04 NOTE — PLAN OF CARE
Problem: Discharge Planning  Goal: Discharge to home or other facility with appropriate resources  5/4/2024 1121 by Gabrielle Barba RN  Outcome: Progressing     Problem: Safety - Adult  Goal: Free from fall injury  5/4/2024 1121 by Gabrielle Barba RN  Outcome: Progressing     Problem: ABCDS Injury Assessment  Goal: Absence of physical injury  5/4/2024 1121 by Gabrielle Barba RN  Outcome: Progressing     Problem: Nutrition Deficit:  Goal: Optimize nutritional status  5/4/2024 1121 by Gabrielle Barba RN  Outcome: Progressing     Problem: Respiratory - Adult  Goal: Achieves optimal ventilation and oxygenation  5/4/2024 1121 by Gabrielle Barba RN  Outcome: Progressing     Problem: Cardiovascular - Adult  Goal: Maintains optimal cardiac output and hemodynamic stability  5/4/2024 1121 by Gabrielle Barba RN  Outcome: Progressing     Problem: Cardiovascular - Adult  Goal: Absence of cardiac dysrhythmias or at baseline  Outcome: Progressing     Problem: Skin/Tissue Integrity - Adult  Goal: Skin integrity remains intact  5/4/2024 1121 by Gabrielle Barba RN  Outcome: Progressing     Problem: Skin/Tissue Integrity - Adult  Goal: Incisions, wounds, or drain sites healing without S/S of infection  Outcome: Progressing     Problem: Skin/Tissue Integrity - Adult  Goal: Oral mucous membranes remain intact  Outcome: Progressing     Problem: Musculoskeletal - Adult  Goal: Return mobility to safest level of function  5/4/2024 1121 by Gabrielle Barba RN  Outcome: Progressing     Problem: Musculoskeletal - Adult  Goal: Maintain proper alignment of affected body part  Outcome: Progressing     Problem: Musculoskeletal - Adult  Goal: Return ADL status to a safe level of function  Outcome: Progressing

## 2024-05-05 LAB
ANION GAP SERPL CALCULATED.3IONS-SCNC: 8 MMOL/L (ref 3–16)
BUN SERPL-MCNC: 16 MG/DL (ref 7–20)
CALCIUM SERPL-MCNC: 8.4 MG/DL (ref 8.3–10.6)
CHLORIDE SERPL-SCNC: 99 MMOL/L (ref 99–110)
CO2 SERPL-SCNC: 30 MMOL/L (ref 21–32)
CREAT SERPL-MCNC: 0.7 MG/DL (ref 0.8–1.3)
GFR SERPLBLD CREATININE-BSD FMLA CKD-EPI: >90 ML/MIN/{1.73_M2}
GLUCOSE SERPL-MCNC: 103 MG/DL (ref 70–99)
MAGNESIUM SERPL-MCNC: 2 MG/DL (ref 1.8–2.4)
POTASSIUM SERPL-SCNC: 3.3 MMOL/L (ref 3.5–5.1)
SODIUM SERPL-SCNC: 137 MMOL/L (ref 136–145)

## 2024-05-05 PROCEDURE — 83735 ASSAY OF MAGNESIUM: CPT

## 2024-05-05 PROCEDURE — 6370000000 HC RX 637 (ALT 250 FOR IP): Performed by: INTERNAL MEDICINE

## 2024-05-05 PROCEDURE — 87205 SMEAR GRAM STAIN: CPT

## 2024-05-05 PROCEDURE — 6370000000 HC RX 637 (ALT 250 FOR IP): Performed by: STUDENT IN AN ORGANIZED HEALTH CARE EDUCATION/TRAINING PROGRAM

## 2024-05-05 PROCEDURE — 87070 CULTURE OTHR SPECIMN AEROBIC: CPT

## 2024-05-05 PROCEDURE — 2580000003 HC RX 258: Performed by: INTERNAL MEDICINE

## 2024-05-05 PROCEDURE — 2700000000 HC OXYGEN THERAPY PER DAY

## 2024-05-05 PROCEDURE — 36415 COLL VENOUS BLD VENIPUNCTURE: CPT

## 2024-05-05 PROCEDURE — 94669 MECHANICAL CHEST WALL OSCILL: CPT

## 2024-05-05 PROCEDURE — 80048 BASIC METABOLIC PNL TOTAL CA: CPT

## 2024-05-05 PROCEDURE — 94640 AIRWAY INHALATION TREATMENT: CPT

## 2024-05-05 PROCEDURE — 6360000002 HC RX W HCPCS: Performed by: INTERNAL MEDICINE

## 2024-05-05 PROCEDURE — 1200000000 HC SEMI PRIVATE

## 2024-05-05 RX ORDER — IPRATROPIUM BROMIDE AND ALBUTEROL SULFATE 2.5; .5 MG/3ML; MG/3ML
1 SOLUTION RESPIRATORY (INHALATION)
Status: DISCONTINUED | OUTPATIENT
Start: 2024-05-05 | End: 2024-05-06 | Stop reason: HOSPADM

## 2024-05-05 RX ADMIN — GUAIFENESIN 600 MG: 600 TABLET ORAL at 08:21

## 2024-05-05 RX ADMIN — IPRATROPIUM BROMIDE AND ALBUTEROL SULFATE 1 DOSE: 2.5; .5 SOLUTION RESPIRATORY (INHALATION) at 16:49

## 2024-05-05 RX ADMIN — DEXTROMETHORPHAN HYDROBROMIDE, GUAIFENESIN 5 ML: 10; 100 LIQUID ORAL at 22:41

## 2024-05-05 RX ADMIN — POTASSIUM CHLORIDE 40 MEQ: 1500 TABLET, EXTENDED RELEASE ORAL at 07:04

## 2024-05-05 RX ADMIN — GUAIFENESIN 600 MG: 600 TABLET ORAL at 21:03

## 2024-05-05 RX ADMIN — SODIUM CHLORIDE, PRESERVATIVE FREE 10 ML: 5 INJECTION INTRAVENOUS at 08:27

## 2024-05-05 RX ADMIN — HYDROXYUREA 1000 MG: 500 CAPSULE ORAL at 08:21

## 2024-05-05 RX ADMIN — DEXTROMETHORPHAN HYDROBROMIDE, GUAIFENESIN 5 ML: 10; 100 LIQUID ORAL at 18:33

## 2024-05-05 RX ADMIN — CEFTRIAXONE SODIUM 2000 MG: 2 INJECTION, POWDER, FOR SOLUTION INTRAMUSCULAR; INTRAVENOUS at 08:27

## 2024-05-05 RX ADMIN — SODIUM CHLORIDE, PRESERVATIVE FREE 10 ML: 5 INJECTION INTRAVENOUS at 21:03

## 2024-05-05 RX ADMIN — DEXTROMETHORPHAN HYDROBROMIDE, GUAIFENESIN 5 ML: 10; 100 LIQUID ORAL at 04:31

## 2024-05-05 RX ADMIN — ENOXAPARIN SODIUM 30 MG: 100 INJECTION SUBCUTANEOUS at 21:02

## 2024-05-05 RX ADMIN — TAMSULOSIN HYDROCHLORIDE 0.4 MG: 0.4 CAPSULE ORAL at 08:21

## 2024-05-05 RX ADMIN — TRIAMTERENE AND HYDROCHLOROTHIAZIDE 0.5 TABLET: 25; 37.5 TABLET ORAL at 09:27

## 2024-05-05 RX ADMIN — FERROUS SULFATE TAB 325 MG (65 MG ELEMENTAL FE) 325 MG: 325 (65 FE) TAB at 08:21

## 2024-05-05 RX ADMIN — ENOXAPARIN SODIUM 30 MG: 100 INJECTION SUBCUTANEOUS at 08:23

## 2024-05-05 RX ADMIN — AZITHROMYCIN MONOHYDRATE 500 MG: 500 INJECTION, POWDER, LYOPHILIZED, FOR SOLUTION INTRAVENOUS at 21:08

## 2024-05-05 RX ADMIN — IPRATROPIUM BROMIDE AND ALBUTEROL SULFATE 1 DOSE: 2.5; .5 SOLUTION RESPIRATORY (INHALATION) at 20:23

## 2024-05-05 ASSESSMENT — PAIN SCALES - GENERAL: PAINLEVEL_OUTOF10: 0

## 2024-05-05 NOTE — PLAN OF CARE
Problem: Discharge Planning  Goal: Discharge to home or other facility with appropriate resources  5/5/2024 1049 by Felecia Bray RN  Outcome: Progressing       Problem: Safety - Adult  Goal: Free from fall injury  5/5/2024 1049 by Felecia Bray RN  Outcome: Progressing    Problem: ABCDS Injury Assessment  Goal: Absence of physical injury  5/5/2024 0052 by Bri Galdamez RN  Outcome: Progressing       Problem: Nutrition Deficit:  Goal: Optimize nutritional status  5/5/2024 1049 by Felecia Bray RN  Outcome: Progressing       Problem: Respiratory - Adult  Goal: Achieves optimal ventilation and oxygenation  5/5/2024 1049 by Felecia Bray RN  Outcome: Progressing    Problem: Cardiovascular - Adult  Goal: Maintains optimal cardiac output and hemodynamic stability  5/5/2024 1049 by Felecia Bray RN  Outcome: Progressing    Goal: Absence of cardiac dysrhythmias or at baseline  5/5/2024 1049 by Felecia Bray RN  Outcome: Progressing    Problem: Skin/Tissue Integrity - Adult  Goal: Skin integrity remains intact  5/5/2024 1049 by Felecia Bray RN  Outcome: Progressing    Goal: Incisions, wounds, or drain sites healing without S/S of infection  5/5/2024 1049 by Felecia Bray RN  Outcome: Progressing    Goal: Oral mucous membranes remain intact    Problem: Musculoskeletal - Adult  Goal: Return mobility to safest level of function  5/5/2024 1049 by Felecia Bray RN  Outcome: Progressing    Goal: Maintain proper alignment of affected body part  5/5/2024 1049 by Felecia Bray RN  Outcome: Progressing    Goal: Return ADL status to a safe level of function       Problem: Gastrointestinal - Adult  Goal: Minimal or absence of nausea and vomiting  5/5/2024 1049 by Felecia Bray RN  Outcome: Progressing    Goal: Maintains or returns to baseline bowel function  5/5/2024 1049 by Felecia Bray RN  Outcome: Progressing    Goal: Maintains adequate nutritional intake

## 2024-05-05 NOTE — PLAN OF CARE
Problem: Discharge Planning  Goal: Discharge to home or other facility with appropriate resources  5/5/2024 0025 by Danielle Jim RN  Outcome: Progressing  5/4/2024 1121 by Gabrielle Barba RN  Outcome: Progressing     Problem: Safety - Adult  Goal: Free from fall injury  5/5/2024 0025 by Danielle Jim RN  Outcome: Progressing  5/4/2024 1121 by Gabrielle Barba RN  Outcome: Progressing     Problem: ABCDS Injury Assessment  Goal: Absence of physical injury  5/5/2024 0025 by Danielle Jim RN  Outcome: Progressing  5/4/2024 1121 by Gabrielle Barba RN  Outcome: Progressing     Problem: Nutrition Deficit:  Goal: Optimize nutritional status  5/5/2024 0025 by Danielle Jim RN  Outcome: Progressing  5/4/2024 1121 by Gabrielle Barba RN  Outcome: Progressing     Problem: Respiratory - Adult  Goal: Achieves optimal ventilation and oxygenation  5/5/2024 0025 by Danielle Jim RN  Outcome: Progressing  5/4/2024 1121 by Gabrielle Barba RN  Outcome: Progressing     Problem: Cardiovascular - Adult  Goal: Maintains optimal cardiac output and hemodynamic stability  5/5/2024 0025 by Danielle Jim RN  Outcome: Progressing  5/4/2024 1121 by Gabrielle Barba RN  Outcome: Progressing  Goal: Absence of cardiac dysrhythmias or at baseline  5/5/2024 0025 by Danielle Jim RN  Outcome: Progressing  5/4/2024 1121 by Gabrielle Barba RN  Outcome: Progressing     Problem: Skin/Tissue Integrity - Adult  Goal: Skin integrity remains intact  5/5/2024 0025 by Danielle Jim RN  Outcome: Progressing  5/4/2024 1121 by Gabrielle Barba RN  Outcome: Progressing  Goal: Incisions, wounds, or drain sites healing without S/S of infection  5/5/2024 0025 by Danielle Jim RN  Outcome: Progressing  5/4/2024 1121 by Gabrielle Barba RN  Outcome: Progressing  Goal: Oral mucous membranes remain intact  5/5/2024 0025 by Danielle Jim RN  Outcome: Progressing  5/4/2024 1121 by Wamba, Gabrielle, RN  Outcome: Progressing     Problem: Musculoskeletal - Adult  Goal: Return mobility to safest level

## 2024-05-05 NOTE — PLAN OF CARE
Problem: Discharge Planning  Goal: Discharge to home or other facility with appropriate resources  5/5/2024 0052 by Bri Galdamez RN  Outcome: Progressing  5/5/2024 0025 by Danielle Jim RN  Outcome: Progressing  5/4/2024 1121 by Gabrielle Barba RN  Outcome: Progressing     Problem: Safety - Adult  Goal: Free from fall injury  5/5/2024 0052 by Bri Galdamez RN  Outcome: Progressing  5/5/2024 0025 by Danielle Jim RN  Outcome: Progressing  5/4/2024 1121 by Gabrielle Barba RN  Outcome: Progressing     Problem: ABCDS Injury Assessment  Goal: Absence of physical injury  5/5/2024 0052 by Bri Galdamez RN  Outcome: Progressing  Flowsheets (Taken 5/5/2024 0051)  Absence of Physical Injury: Implement safety measures based on patient assessment  5/5/2024 0025 by Danielle Jim RN  Outcome: Progressing  5/4/2024 1121 by Gabrielle Barba RN  Outcome: Progressing     Problem: Nutrition Deficit:  Goal: Optimize nutritional status  5/5/2024 0052 by Bri Galdamez RN  Outcome: Progressing  5/5/2024 0025 by Danielle Jim RN  Outcome: Progressing  5/4/2024 1121 by Gabrielle Barba RN  Outcome: Progressing     Problem: Respiratory - Adult  Goal: Achieves optimal ventilation and oxygenation  5/5/2024 0052 by Bri Galdamez RN  Outcome: Progressing  5/5/2024 0025 by Danielle Jim RN  Outcome: Progressing  5/4/2024 1121 by Gabrielle Barba RN  Outcome: Progressing     Problem: Cardiovascular - Adult  Goal: Maintains optimal cardiac output and hemodynamic stability  5/5/2024 0052 by Bri Galdamez RN  Outcome: Progressing  5/5/2024 0025 by Danielle Jim RN  Outcome: Progressing  5/4/2024 1121 by Gabrielle Barba RN  Outcome: Progressing  Goal: Absence of cardiac dysrhythmias or at baseline  5/5/2024 0025 by Danielle Jim RN  Outcome: Progressing  5/4/2024 1121 by Gabrielle Barba RN  Outcome: Progressing     Problem: Skin/Tissue Integrity - Adult  Goal: Skin integrity remains intact  5/5/2024 0052 by Bri Galdamez RN  Outcome: Progressing  5/5/2024 0025 by

## 2024-05-06 ENCOUNTER — APPOINTMENT (OUTPATIENT)
Dept: GENERAL RADIOLOGY | Age: 79
DRG: 871 | End: 2024-05-06
Payer: MEDICARE

## 2024-05-06 VITALS
OXYGEN SATURATION: 92 % | BODY MASS INDEX: 37.45 KG/M2 | RESPIRATION RATE: 18 BRPM | HEART RATE: 90 BPM | WEIGHT: 276.5 LBS | TEMPERATURE: 98.6 F | HEIGHT: 72 IN | SYSTOLIC BLOOD PRESSURE: 126 MMHG | DIASTOLIC BLOOD PRESSURE: 67 MMHG

## 2024-05-06 LAB
ANION GAP SERPL CALCULATED.3IONS-SCNC: 12 MMOL/L (ref 3–16)
BASOPHILS # BLD: 0.1 K/UL (ref 0–0.2)
BASOPHILS NFR BLD: 1 %
BUN SERPL-MCNC: 13 MG/DL (ref 7–20)
CALCIUM SERPL-MCNC: 9.3 MG/DL (ref 8.3–10.6)
CHLORIDE SERPL-SCNC: 98 MMOL/L (ref 99–110)
CO2 SERPL-SCNC: 30 MMOL/L (ref 21–32)
CREAT SERPL-MCNC: 0.7 MG/DL (ref 0.8–1.3)
DEPRECATED RDW RBC AUTO: 15.6 % (ref 12.4–15.4)
EOSINOPHIL # BLD: 0.2 K/UL (ref 0–0.6)
EOSINOPHIL NFR BLD: 4.3 %
GFR SERPLBLD CREATININE-BSD FMLA CKD-EPI: >90 ML/MIN/{1.73_M2}
GLUCOSE SERPL-MCNC: 125 MG/DL (ref 70–99)
HCT VFR BLD AUTO: 33.2 % (ref 40.5–52.5)
HGB BLD-MCNC: 11.8 G/DL (ref 13.5–17.5)
LYMPHOCYTES # BLD: 0.8 K/UL (ref 1–5.1)
LYMPHOCYTES NFR BLD: 13.4 %
MAGNESIUM SERPL-MCNC: 2.2 MG/DL (ref 1.8–2.4)
MCH RBC QN AUTO: 34.6 PG (ref 26–34)
MCHC RBC AUTO-ENTMCNC: 35.5 G/DL (ref 31–36)
MCV RBC AUTO: 97.5 FL (ref 80–100)
MONOCYTES # BLD: 0.5 K/UL (ref 0–1.3)
MONOCYTES NFR BLD: 9.3 %
NEUTROPHILS # BLD: 4.1 K/UL (ref 1.7–7.7)
NEUTROPHILS NFR BLD: 72 %
NT-PROBNP SERPL-MCNC: 197 PG/ML (ref 0–449)
PLATELET # BLD AUTO: 290 K/UL (ref 135–450)
PMV BLD AUTO: 7.3 FL (ref 5–10.5)
POTASSIUM SERPL-SCNC: 3.3 MMOL/L (ref 3.5–5.1)
RBC # BLD AUTO: 3.41 M/UL (ref 4.2–5.9)
SODIUM SERPL-SCNC: 140 MMOL/L (ref 136–145)
WBC # BLD AUTO: 5.7 K/UL (ref 4–11)

## 2024-05-06 PROCEDURE — 97116 GAIT TRAINING THERAPY: CPT

## 2024-05-06 PROCEDURE — 94669 MECHANICAL CHEST WALL OSCILL: CPT

## 2024-05-06 PROCEDURE — 6370000000 HC RX 637 (ALT 250 FOR IP): Performed by: INTERNAL MEDICINE

## 2024-05-06 PROCEDURE — 2700000000 HC OXYGEN THERAPY PER DAY

## 2024-05-06 PROCEDURE — 83880 ASSAY OF NATRIURETIC PEPTIDE: CPT

## 2024-05-06 PROCEDURE — 36415 COLL VENOUS BLD VENIPUNCTURE: CPT

## 2024-05-06 PROCEDURE — 2580000003 HC RX 258: Performed by: INTERNAL MEDICINE

## 2024-05-06 PROCEDURE — 83735 ASSAY OF MAGNESIUM: CPT

## 2024-05-06 PROCEDURE — 97530 THERAPEUTIC ACTIVITIES: CPT

## 2024-05-06 PROCEDURE — 85025 COMPLETE CBC W/AUTO DIFF WBC: CPT

## 2024-05-06 PROCEDURE — 71045 X-RAY EXAM CHEST 1 VIEW: CPT

## 2024-05-06 PROCEDURE — 6360000002 HC RX W HCPCS: Performed by: STUDENT IN AN ORGANIZED HEALTH CARE EDUCATION/TRAINING PROGRAM

## 2024-05-06 PROCEDURE — 6360000002 HC RX W HCPCS: Performed by: INTERNAL MEDICINE

## 2024-05-06 PROCEDURE — 6370000000 HC RX 637 (ALT 250 FOR IP): Performed by: STUDENT IN AN ORGANIZED HEALTH CARE EDUCATION/TRAINING PROGRAM

## 2024-05-06 PROCEDURE — 80048 BASIC METABOLIC PNL TOTAL CA: CPT

## 2024-05-06 PROCEDURE — 94760 N-INVAS EAR/PLS OXIMETRY 1: CPT

## 2024-05-06 PROCEDURE — 97110 THERAPEUTIC EXERCISES: CPT

## 2024-05-06 PROCEDURE — 94640 AIRWAY INHALATION TREATMENT: CPT

## 2024-05-06 RX ORDER — GUAIFENESIN 600 MG/1
600 TABLET, EXTENDED RELEASE ORAL 2 TIMES DAILY
Qty: 14 TABLET | Refills: 0 | Status: SHIPPED | OUTPATIENT
Start: 2024-05-06 | End: 2024-05-13

## 2024-05-06 RX ORDER — POTASSIUM CHLORIDE 20 MEQ/1
40 TABLET, EXTENDED RELEASE ORAL ONCE
Status: COMPLETED | OUTPATIENT
Start: 2024-05-06 | End: 2024-05-06

## 2024-05-06 RX ORDER — GUAIFENESIN/DEXTROMETHORPHAN 100-10MG/5
5 SYRUP ORAL EVERY 4 HOURS PRN
Qty: 120 ML | Refills: 0 | Status: SHIPPED | OUTPATIENT
Start: 2024-05-06 | End: 2024-05-16

## 2024-05-06 RX ORDER — AMOXICILLIN AND CLAVULANATE POTASSIUM 875; 125 MG/1; MG/1
1 TABLET, FILM COATED ORAL 2 TIMES DAILY
Qty: 10 TABLET | Refills: 0 | Status: SHIPPED | OUTPATIENT
Start: 2024-05-06 | End: 2024-05-11

## 2024-05-06 RX ORDER — FUROSEMIDE 40 MG/1
40 TABLET ORAL DAILY
Qty: 5 TABLET | Refills: 0 | Status: SHIPPED | OUTPATIENT
Start: 2024-05-06 | End: 2024-05-11

## 2024-05-06 RX ORDER — FUROSEMIDE 10 MG/ML
40 INJECTION INTRAMUSCULAR; INTRAVENOUS ONCE
Status: COMPLETED | OUTPATIENT
Start: 2024-05-06 | End: 2024-05-06

## 2024-05-06 RX ORDER — POTASSIUM CHLORIDE 20 MEQ/1
20 TABLET, EXTENDED RELEASE ORAL DAILY
Qty: 5 TABLET | Refills: 0 | Status: SHIPPED | OUTPATIENT
Start: 2024-05-06 | End: 2024-05-11

## 2024-05-06 RX ADMIN — TRIAMTERENE AND HYDROCHLOROTHIAZIDE 0.5 TABLET: 25; 37.5 TABLET ORAL at 08:43

## 2024-05-06 RX ADMIN — FUROSEMIDE 40 MG: 10 INJECTION, SOLUTION INTRAMUSCULAR; INTRAVENOUS at 11:35

## 2024-05-06 RX ADMIN — DEXTROMETHORPHAN HYDROBROMIDE, GUAIFENESIN 5 ML: 10; 100 LIQUID ORAL at 15:24

## 2024-05-06 RX ADMIN — IPRATROPIUM BROMIDE AND ALBUTEROL SULFATE 1 DOSE: 2.5; .5 SOLUTION RESPIRATORY (INHALATION) at 16:34

## 2024-05-06 RX ADMIN — FERROUS SULFATE TAB 325 MG (65 MG ELEMENTAL FE) 325 MG: 325 (65 FE) TAB at 08:44

## 2024-05-06 RX ADMIN — TAMSULOSIN HYDROCHLORIDE 0.4 MG: 0.4 CAPSULE ORAL at 08:44

## 2024-05-06 RX ADMIN — CEFTRIAXONE SODIUM 2000 MG: 2 INJECTION, POWDER, FOR SOLUTION INTRAMUSCULAR; INTRAVENOUS at 08:51

## 2024-05-06 RX ADMIN — HYDROXYUREA 1000 MG: 500 CAPSULE ORAL at 08:44

## 2024-05-06 RX ADMIN — ENOXAPARIN SODIUM 30 MG: 100 INJECTION SUBCUTANEOUS at 08:44

## 2024-05-06 RX ADMIN — IPRATROPIUM BROMIDE AND ALBUTEROL SULFATE 1 DOSE: 2.5; .5 SOLUTION RESPIRATORY (INHALATION) at 07:50

## 2024-05-06 RX ADMIN — SODIUM CHLORIDE, PRESERVATIVE FREE 10 ML: 5 INJECTION INTRAVENOUS at 08:47

## 2024-05-06 RX ADMIN — POTASSIUM CHLORIDE 40 MEQ: 1500 TABLET, EXTENDED RELEASE ORAL at 15:24

## 2024-05-06 RX ADMIN — IPRATROPIUM BROMIDE AND ALBUTEROL SULFATE 1 DOSE: 2.5; .5 SOLUTION RESPIRATORY (INHALATION) at 12:13

## 2024-05-06 RX ADMIN — GUAIFENESIN 600 MG: 600 TABLET ORAL at 08:44

## 2024-05-06 NOTE — PLAN OF CARE
Problem: Discharge Planning  Goal: Discharge to home or other facility with appropriate resources  5/6/2024 0000 by Bri Galdamez RN  Outcome: Progressing  5/5/2024 1049 by Felecia Bray RN  Outcome: Progressing     Problem: Safety - Adult  Goal: Free from fall injury  5/6/2024 0000 by Bri Galdamez RN  Outcome: Progressing  5/5/2024 1049 by Felecia Bray RN  Outcome: Progressing     Problem: ABCDS Injury Assessment  Goal: Absence of physical injury  Outcome: Progressing     Problem: Nutrition Deficit:  Goal: Optimize nutritional status  5/6/2024 0000 by Bri Galdamez RN  Outcome: Progressing  5/5/2024 1049 by Felecia Bray RN  Outcome: Progressing     Problem: Respiratory - Adult  Goal: Achieves optimal ventilation and oxygenation  5/6/2024 0000 by Bri Galdamez RN  Outcome: Progressing  5/5/2024 1049 by Felecia Bray RN  Outcome: Progressing     Problem: Cardiovascular - Adult  Goal: Maintains optimal cardiac output and hemodynamic stability  5/6/2024 0000 by Bri Galdamez RN  Outcome: Progressing  5/5/2024 1049 by Felecia Bray RN  Outcome: Progressing  Goal: Absence of cardiac dysrhythmias or at baseline  5/5/2024 1049 by Felecia Bray RN  Outcome: Progressing     Problem: Skin/Tissue Integrity - Adult  Goal: Skin integrity remains intact  5/6/2024 0000 by Bri Galdamez RN  Outcome: Progressing  5/5/2024 1049 by Felecia Bray RN  Outcome: Progressing  Goal: Incisions, wounds, or drain sites healing without S/S of infection  5/5/2024 1049 by Felecia Bray RN  Outcome: Progressing     Problem: Musculoskeletal - Adult  Goal: Return mobility to safest level of function  5/6/2024 0000 by Bri Galdamez RN  Outcome: Progressing  5/5/2024 1049 by Felecia Bray RN  Outcome: Progressing  Goal: Maintain proper alignment of affected body part  5/5/2024 1049 by Felecia Bray RN  Outcome: Progressing     Problem: Gastrointestinal - Adult  Goal: Minimal or absence of nausea and vomiting  5/6/2024 0000 by

## 2024-05-06 NOTE — DISCHARGE INSTRUCTIONS
- please schedule an appointment to see your PCP  - please schedule an appointment to see pulmonology if you are still having breathing problems  - please take medications as prescribed.   - please go to lab in one week for blood work

## 2024-05-06 NOTE — PROGRESS NOTES
[facility]    Respiratory Therapy   Home Oxygen Evaluation        Name: Shadi Carreon  Medical Record Number: [id number]  Age: 78 y.o.  Gender:  male   : 1945  Today's date: 2024  Room: [place] [address]      Assessment        /67   Pulse 90   Temp 98.6 °F (37 °C) (Oral)   Resp 18   Ht 1.829 m (6')   Wt 125.4 kg (276 lb 8 oz)   SpO2 90%   BMI 37.50 kg/m²     Patient Active Problem List   Diagnosis    Sleep apnea    Edema    Encounter for long-term (current) use of other medications    Essential thrombocythemia    Essential hypertension, benign    Osteoarthritis of left knee    Osteoarthritis of hip-right    Allergic rhinitis    Allergic dermatitis    Primary osteoarthritis of left hip    Arthritis of left hip    Morbid obesity (HCC)    History of total left hip replacement    Arthritis of right knee    Tendinitis of finger    Hx of total hip arthroplasty, right    Achilles tendinosis of right lower extremity    MEGN-2 gene mutation    Encounter for long-term (current) use of high-risk medication    Benign non-nodular prostatic hyperplasia with lower urinary tract symptoms    Rosacea    Right groin pain    Arthritis of right hip    Pneumonia, bacterial       Social History:  Social History     Tobacco Use    Smoking status: Never    Smokeless tobacco: Never   Vaping Use    Vaping Use: Never used   Substance Use Topics    Alcohol use: Yes     Comment: seldom    Drug use: No       Patient Room Air saturation at rest 82  %  Patient ambulated 3 minutes.  (Minimum of 3 minutes unless Sp02 < 88% prior to 3 minute isaías)    Oxygen saturations of 88% or less on RA qualifies patient for Home Oxygen    Patient resting on 3  lmp  with an oxygen saturation of  94 %     Patient ambulated on 3 lpm with an oxygen saturation of 90%      Qualifying patient for home oxygen with ambulation and continuous flow  @ 3 lpm.      In your clinical assessment does the Patient Require Portable Oxygen 
    V2.0  [facility] Hospitalist Progress Note      Name:  Shadi Carreon /Age/Sex: 1945  (78 y.o. male)   MRN & CSN:  [id number] & [id number] Encounter Date/Time: 2024 11:58 AM EDT    Location:  [id number]/4121-01 PCP: Michel Gunter MD       Hospital Day: 4      Subjective:     Chief Complaint:  Fall (Fall x2 today. Denies dizziness, lighthead, LOC. Denies pain. Abrasion to R head and L knee. )     Pt feels better today. Cough has improved. Weaned down to room air at rest but still requiring 2L to sat above 90%. Good urine output with lasix IV.      Assessment and Plan:   Acute hypoxic respiratory failure 2/2 viral vs bacterial pneumonia. Suspect element of volume overload. . CXR bilateral opacities.   Remains on room O2 2L with ambulation but on room air at rest   - will continue abx as below for now  - wean O2 as tolerated  - symptomatic management  - IV diuretics as below  - add IS, [place]  - add breathing tx    Sepsis 2/2 bacterial vs viral PNA. Came w SIRS 2/4 tachycardia, fever T 100.4. CXR bilateral opacities. Had URI symptoms. Procal negative and no leukocytosis but pt on hydroxyurea.   strep, legionella  negative.   - Was on IVF; now off   - continue rocephin, azithromycin  - f/u blood cxX2, resp cx      Concern for CHF - unlikely No prior hx. Has been having orthopnea, SOB, leg edema.  but likely false negative given pt's BMI.   ECHO poor quality but no sign of systolic or diastolic CHF; just mild MR, TR  - check ECHO; report pending  - continue IV diuresis  - resume home meds    Essential HTN. On home triamterene-hCTZ.  - resume    Thrombocythemia w MENG 2 mutation.  - resume home hydroxyurea        Personally reviewed Lab Studies and Imaging   Imaging that was interpreted personally includes CXR and results as above    Drugs that require monitoring for toxicity include lovenox and the method of monitoring was cbc      Diet ADULT DIET; Regular  ADULT ORAL NUTRITION SUPPLEMENT; Dinner; 
Comprehensive Nutrition Assessment    Type and Reason for Visit:  Positive Nutrition Screen, Initial    Nutrition Recommendations/Plan:   Continue current diet - Regular  Start ONS once per day - Clears     Malnutrition Assessment:  Malnutrition Status:  At risk for malnutrition (Comment) (05/03/24 1207)    Context:  Acute Illness     Findings of the 6 clinical characteristics of malnutrition:  Energy Intake:  No significant decrease in energy intake  Weight Loss:  No significant weight loss     Body Fat Loss:  No significant body fat loss     Muscle Mass Loss:  No significant muscle mass loss    Fluid Accumulation:  Moderate to Severe Extremities   Strength:  Not Performed    Nutrition Assessment:    MST=2 Pt presents with PNA, pt had 2 falls at home s/p bilateral hip replacement. Spoke with pt at bedside, he states he has normal appetite and has been trying to eat healthier while inpatient. Pt has lactose intolerance and is agreeable to ONS without lactose. To order Clears Biswas once per day. +ferrous sulfate, fish oil. Endorses constipation, attributed to medication side effects. Will continue to monitor while inpatient.    Nutrition Related Findings:    TIBC 238. LBM 5/3. Wound Type: Surgical Incision (bilateral hip replacement)       Current Nutrition Intake & Therapies:    Average Meal Intake: Unable to assess  Average Supplements Intake: None Ordered  ADULT DIET; Regular  ADULT ORAL NUTRITION SUPPLEMENT; Dinner; Clear Liquid Oral Supplement    Anthropometric Measures:  Height: 182.9 cm (6' 0.01\")  Ideal Body Weight (IBW): 178 lbs (81 kg)       Current Body Weight: 126.2 kg (278 lb 3.5 oz), 156.3 % IBW.    Current BMI (kg/m2): 37.7                          BMI Categories: Obese Class 2 (BMI 35.0 -39.9)    Estimated Daily Nutrient Needs:  Energy Requirements Based On: Kcal/kg  Weight Used for Energy Requirements: Current  Energy (kcal/day): 1640-2019kcal (13-16kcal/kg)  Weight Used for Protein Requirements: 
Comprehensive Nutrition Assessment    Type and Reason for Visit:  Reassess    Nutrition Recommendations/Plan:   Continue regular diet   Continue berry Ensure Clear daily     Malnutrition Assessment:  Malnutrition Status:  At risk for malnutrition (Comment) (05/03/24 1207)    Context:  Acute Illness     Findings of the 6 clinical characteristics of malnutrition:  Energy Intake:  No significant decrease in energy intake  Weight Loss:  No significant weight loss     Body Fat Loss:  No significant body fat loss     Muscle Mass Loss:  No significant muscle mass loss    Fluid Accumulation:  Moderate to Severe Extremities   Strength:  Not Performed    Nutrition Assessment:    Follow-up. Pt continues to tolerate regular diet at %. Pt endorsed acceptance of Biswas Ensure Clear offered daily. Only suggestion made per pt is to serve more moist hamburgers. \"They are too dry.\" Will refer to  and  regarding suggestion. Pt noted with BM on 5/5. Current nutrition regimen remains appropriate. Will continue to monitor progress.    Nutrition Related Findings:    Labs reviewed. Noted pt -3,515 liters. Noted +1 edema to BLE.Noted BM on 5/5. Wound Type: Moisture Associate Skin Damage (buttocks)       Current Nutrition Intake & Therapies:    Average Meal Intake: %  Average Supplements Intake: %  ADULT DIET; Regular  ADULT ORAL NUTRITION SUPPLEMENT; Dinner; Clear Liquid Oral Supplement    Anthropometric Measures:  Height: 182.9 cm (6')  Ideal Body Weight (IBW): 178 lbs (81 kg)       Current Body Weight: 125.6 kg (277 lb), 156.3 % IBW.    Current BMI (kg/m2): 37.6  Usual Body Weight:  (270-280s over the past year)                       BMI Categories: Obese Class 2 (BMI 35.0 -39.9)    Estimated Daily Nutrient Needs:  Energy Requirements Based On: Kcal/kg  Weight Used for Energy Requirements: Current  Energy (kcal/day): 1640-2019kcal (13-16kcal/kg)  Weight Used for Protein Requirements: 
Dr. Esme Yarbrough MD requested patient walk without o2 2L . Patient o2 86%-87% after walking to toilet. Patient reapplied 2 L 02 now 91%  
O2 tank delivered to pt's room.  
Occupational Therapy  Facility/Department: [address] MED SURG  Occupational Therapy Initial Assessment    Name: Shadi Carreon  : 1945  MRN: [id number]  Date of Service: 5/3/2024    Discharge Recommendations:  24 hour supervision or assist, Home with Home health OT      Shadi Carreon scored a  on the AM-[facility] ADL Inpatient form. See assessment.     If patient discharges prior to next session this note will serve as a discharge summary.  Please see below for the latest assessment towards goals.      Patient Diagnosis(es): The primary encounter diagnosis was Hypoxia. Diagnoses of Pneumonia of both lungs due to infectious organism, unspecified part of lung, Bilateral leg pain, and Bilateral leg weakness were also pertinent to this visit.  Past Medical History:  has a past medical history of Amnesia, Arthritis, Erythema, Essential thrombocythemia (HCC), HBP (high blood pressure), and Nodule.  Past Surgical History:  has a past surgical history that includes Carpal tunnel release; knee surgery (); knee surgery (Left); back surgery; Colonoscopy (); Colonoscopy (1/15); bone marrow biopsy; joint replacement (Left); joint replacement (Right, 2019); and Total hip arthroplasty (Right, 2019).    Treatment Diagnosis: impaired ADL status, strength/ROM R shoulder, fxl mobility, endurance      Assessment   Performance deficits / Impairments: Decreased functional mobility ;Decreased ADL status;Decreased ROM;Decreased strength;Decreased endurance;Decreased balance;Decreased high-level IADLs  Assessment: Pt is a 78 y.o. male admitted with Bilateral lower lobe PNA, Acute respiratory failure with hypoxia, Generalized weakness with frequent falls. PTA, pt living with wife and independent ADLs, IADLs, and fxl mobility with quad cane. Pt currently functioning below baseline d/t the above deficits, today requiring CGA fxl transfers/mobility with RW, mod A toileting, and anticipate pt would require mod A for full ADL 
Patient doing well this morning, up in the chair, no complaints of pain at this time, states coughing has improved.     Electronically signed by Everton Velazquez RN on 5/3/2024 at 9:09 AM    
Patient educated on how to use incentive spirometer. Patient denies dizziness patient denies pain. Call light within reach patient sitting up in chair   
Patient is admitted to room 4121 from the ED by bed AO X4. Patient denies pain. Patient is oriented to room, call light is placed within reach.  
Patient's O2 sat was 89% on admission to room, oxygen therapy protocol was initiated by this RN, and patient placed on 1L which increased patient's O2 sat to 92%.    Patient's potassium was 3.4. This RN administered 40mEq of KCl oral tablet per order.    Patient requested to go to the bathroom for a bowel movement, but said he was not sure if his knee would support. I recommended the use of the bed pan, but he rejected it. I transferred the patient to the bathroom with a steady, and he tolerated fairly well. Patient returned to bed.    Patient requests this RN to transfer him by stady to recliner chair before this RN completes his his shift. Patient transferred to recliner, chair alarm activated. Call light placed within reach.  
Physical Therapy  Facility/Department: [address] MED SURG  Physical Therapy Daily Note      Name: Shadi Carreon  : 1945  MRN: [id number]  Date of Service: 2024    Discharge Recommendations:  Patient would benefit from continued therapy after discharge, Home with Home health PT, 24 hour supervision or assist   Shadi Carreon scored a 17/24 on the AM-PAC short mobility form. Current research shows that an AM-PAC score of 18 or greater is typically associated with a discharge to the patient's home setting. Based on the patient's AM-PAC score and their current functional mobility deficits, it is recommended that the patient have 2-3 sessions per week of Physical Therapy at d/c to increase the patient's independence.  At this time, this patient demonstrates the endurance and safety to discharge home with initial 24/7 assist and home PT (home vs OP services) and a follow up treatment frequency of 2-3x/wk.  Please see assessment section for further patient specific details.  PT Equipment Recommendations  Equipment Needed: Yes  Mobility Devices: Walker  Walker: Rolling      Patient Diagnosis(es): The primary encounter diagnosis was Hypoxia. Diagnoses of Pneumonia of both lungs due to infectious organism, unspecified part of lung, Bilateral leg pain, Bilateral leg weakness, and Edema were also pertinent to this visit.  Past Medical History:  has a past medical history of Amnesia, Arthritis, Erythema, Essential thrombocythemia (HCC), HBP (high blood pressure), and Nodule.  Past Surgical History:  has a past surgical history that includes Carpal tunnel release; knee surgery (); knee surgery (Left); back surgery; Colonoscopy (); Colonoscopy (1/15); bone marrow biopsy; joint replacement (Left); joint replacement (Right, 2019); and Total hip arthroplasty (Right, 2019).    Assessment   Assessment: Today, the pt is demonstrating improvement in strength and tolerance for activity although currently is still 
Pt educated on discharge instructions and follow-up appointments. Pt states no further questions at this time. Pt IV removed with no complications. Pt transported to home by daughter.    Electronically signed by Petra Velazquez RN on 5/6/2024 at 7:06 PM     
Swallow evaluation order received. RN denies concerns for dysphagia at this time. Prior to initiation of assessment, MD met outside of room also denying concern for need for swallow evaluation at this time. Order discontinued. Please re-order if concern re-arises.    Thank you.  Peggy Cabral MA, CCC-SLP, #[id number]  Speech-Language Pathologist  Portable phone: [phone]  05/03/24  11:27 AM    
8/20/2019).    Assessment   Assessment: The pt is a 77 yo male who presented to the ED with generalized weakness and multiple falls x 2 days. The pt has been treated for pna as an OP; CXR showing B lower infiltrates. In the ED he was hypoxic and needed 2L O2. Imaging L knee: small suprapatellar effusion. PMHx:amnesia, arth, thrombocytopenia, HTN, back sx, CTR, B THR, knee sx, Lower extremity edema venous stasis dermatitis and chronic venous insufficiency   The pt reports he lives with his wife in a house with 1+1 access. PTA, the pt was indep in ambulation using a QC, indep in self-care and was able to assist with some IADl's including going downstairs to do the laundry. Today, the pt demonstrated that he is functioning below his reported baseline and is now needing the walker for safe ambulation, CGA for transfers and for ambulation with the RW 25 feet x 2. The pt very guarded initially but this improved with time and distance; the pt had no LOB. Anticipate that the pt will progress and be safe for home but would benefit from having a walker for home and home PT. Will con't to follow while on the acute care floor.  Therapy Prognosis: Good  Decision Making: Medium Complexity  Barriers to Learning: none  Requires PT Follow-Up: Yes  Activity Tolerance  Activity Tolerance: Patient limited by endurance  Activity Tolerance Comments: SpO2 on 1.5L O2 94% at rest and dropped to 89% with activity, the pt quickly recovered with rest and PLB     Plan   Physical Therapy Plan  General Plan: 3-5 times per week  Current Treatment Recommendations: Strengthening, Balance training, Functional mobility training, Transfer training, Gait training, Stair training, Safety education & training, Patient/Caregiver education & training, Equipment evaluation, education, & procurement, Therapeutic activities  Safety Devices  Type of Devices: Call light within reach, Chair alarm in place, Gait belt, Patient at risk for falls, Left in chair, 
Platelets 245 135 - 450 K/uL    MPV 7.5 5.0 - 10.5 fL    Neutrophils % 87.8 %    Lymphocytes % 4.2 %    Monocytes % 7.2 %    Eosinophils % 0.5 %    Basophils % 0.3 %    Neutrophils Absolute 7.6 1.7 - 7.7 K/uL    Lymphocytes Absolute 0.4 (L) 1.0 - 5.1 K/uL    Monocytes Absolute 0.6 0.0 - 1.3 K/uL    Eosinophils Absolute 0.0 0.0 - 0.6 K/uL    Basophils Absolute 0.0 0.0 - 0.2 K/uL   Comprehensive Metabolic Panel    Collection Time: 05/02/24  9:12 PM   Result Value Ref Range    Sodium 136 136 - 145 mmol/L    Potassium 3.4 (L) 3.5 - 5.1 mmol/L    Chloride 100 99 - 110 mmol/L    CO2 27 21 - 32 mmol/L    Anion Gap 9 3 - 16    Glucose 106 (H) 70 - 99 mg/dL    BUN 15 7 - 20 mg/dL    Creatinine 0.9 0.8 - 1.3 mg/dL    Est, Glom Filt Rate 88 >60    Calcium 8.5 8.3 - 10.6 mg/dL    Total Protein 6.5 6.4 - 8.2 g/dL    Albumin 3.8 3.4 - 5.0 g/dL    Albumin/Globulin Ratio 1.4 1.1 - 2.2    Total Bilirubin 1.3 (H) 0.0 - 1.0 mg/dL    Alkaline Phosphatase 42 40 - 129 U/L    ALT 23 10 - 40 U/L    AST 23 15 - 37 U/L   Lactic Acid    Collection Time: 05/02/24  9:12 PM   Result Value Ref Range    Lactic Acid 1.2 0.4 - 2.0 mmol/L   Troponin    Collection Time: 05/02/24  9:12 PM   Result Value Ref Range    Troponin, High Sensitivity 21 0 - 22 ng/L   Brain Natriuretic Peptide    Collection Time: 05/02/24  9:12 PM   Result Value Ref Range    Pro- (H) 0 - 449 pg/mL   Procalcitonin    Collection Time: 05/02/24  9:12 PM   Result Value Ref Range    Procalcitonin 0.09 0.00 - 0.15 ng/mL   COVID-19, Rapid    Collection Time: 05/02/24  9:39 PM    Specimen: Nasopharyngeal Swab   Result Value Ref Range    SARS-CoV-2, NAAT Not Detected Not Detected   EKG 12 Lead    Collection Time: 05/02/24  9:39 PM   Result Value Ref Range    Ventricular Rate 102 BPM    Atrial Rate 102 BPM    P-R Interval 180 ms    QRS Duration 138 ms    Q-T Interval 370 ms    QTc Calculation (Bazett) 482 ms    P Axis 73 degrees    R Axis 64 degrees    T Axis 14 degrees    
abnormality.     XR KNEE LEFT (1-2 VIEWS)    Result Date: 5/2/2024  EXAMINATION: 2 XRAY VIEWS OF THE LEFT KNEE 5/2/2024 9:21 pm COMPARISON: 06/18/2015 HISTORY: ORDERING SYSTEM PROVIDED HISTORY: fall, injury TECHNOLOGIST PROVIDED HISTORY: Reason for exam:->fall, injury Reason for Exam: fall, injury FINDINGS: There is moderately severe joint space narrowing throughout the knee which is more prominent medially and is more apparent.  There are prominent osteophytes throughout.  There is subchondral linear lucency and sclerosis along the tibial plateau which is unchanged and may be due to prior surgery or old trauma.  The bones are severely osteopenic.  No acute fracture or dislocation is seen.  There is moderate narrowing the patellofemoral joint with a small suprapatellar effusion which is more prominent.     Diffuse osteopenia limiting the exam and moderately severe osteoarthritic changes in the knee which is more prominent with no acute bony abnormality. Questionable postop changes along the tibial plateau which is unchanged. Moderate patellofemoral degenerative changes a small suprapatellar effusion which more prominent.     XR CHEST (2 VW)    Result Date: 5/2/2024  EXAMINATION: TWO XRAY VIEWS OF THE CHEST 5/2/2024 10:21 am COMPARISON: 12/04/2008 HISTORY: ORDERING SYSTEM PROVIDED HISTORY: Subacute cough TECHNOLOGIST PROVIDED HISTORY: Reason for Exam: Subacute cough FINDINGS: There is some elevation of the right hemidiaphragm with some bilateral basal increase markings.  These may represent atelectatic changes but the pneumonia cannot be excluded.  Heart and mediastinal structures unremarkable.  Bony thorax appears normal.     Bilateral basal increase markings may represent atelectatic changes but pneumonia cannot be excluded.  Clinical correlation suggested.       Electronically signed by Zenon Victoria MD on 5/4/2024 at 11:25 AM

## 2024-05-06 NOTE — CARE COORDINATION
5/3 Call received from [facility], can accept for home health services at discharge.Electronically signed by Mis Hughes RN on 5/3/2024 at 3:32 PM    
5/6 Plan: Return to home with wife and [facility]. Home oxygen test positive-pt. needs home O2 at 3 L/M. Need order. Wife will transport. [facility] orders faxed to [facility]. Electronically signed by Mis Hughes RN on 5/6/2024 at 3:09 PM    
DISCHARGE SUMMARY     DATE OF DISCHARGE: 5/6/24    DISCHARGE DESTINATION: Home with wife    HOME CARE: Yes    Agency Name:   Discharging to Facility/ Agency   Name: [facility]  Address:  [address] [place] [address]  Phone:  [phone]  Fax:  [phone]     HEMODIALYSIS: No    TRANSPORTATION: Private Car    NEW DME ORDERED: yes    COMMENTS: Oxygen to be delivered by Respiratory Therapist for [employer]. [facility] orders faxed to [facility]. Electronically signed by Mis Hughes RN on 5/6/2024 at 3:38 PM     
  Cognition Alert   History Provided By Patient   Primary Caregiver Self   Accompanied By/Relationship self   Support Systems Spouse/Significant Other   Patient's Healthcare Decision Maker is: Legal Next of Kin   PCP Verified by CM Yes   Last Visit to PCP Within last 3 months   Prior Functional Level Independent in ADLs/IADLs   Current Functional Level Independent in ADLs/IADLs   Can patient return to prior living arrangement Yes   Ability to make needs known: Good   Family able to assist with home care needs: Yes   Would you like for me to discuss the discharge plan with any other family members/significant others, and if so, who? Yes  (wife)   Financial Resources Medicare   Community Resources None   CM/SW Referral Other (see comment)  (discharge planning)   Discharge Planning   Type of Residence House  (2 steps to enter)   Living Arrangements Spouse/Significant Other   Current Services Prior To Admission None   Potential Assistance Needed Home Care   DME Ordered? No   Potential Assistance Purchasing Medications No   Type of Home Care Services None   Patient expects to be discharged to: House   Follow Up Appointment: Best Day/Time  Monday AM   One/Two Story Residence One story   History of falls? 1   Services At/After Discharge   Transition of Care Consult (CM Consult) Home Health;Discharge Planning   Services At/After Discharge Home Health    Resource Information Provided? No   Mode of Transport at Discharge Other (see comment)  (Daughter)   Confirm Follow Up Transport Family   Condition of Participation: Discharge Planning   The Plan for Transition of Care is related to the following treatment goals: strengthening and pna   The Patient and/or Patient Representative was provided with a Choice of Provider? Patient   The Patient and/Or Patient Representative agree with the Discharge Plan? Yes   Freedom of Choice list was provided with basic dialogue that supports the patient's individualized plan of

## 2024-05-06 NOTE — RT PROTOCOL NOTE
Protocol order mode.        4-6 - enter or revise RT Bronchodilator order(s) to two equivalent RT bronchodilator orders with one order with BID Frequency and one order with Frequency of every 4 hours PRN wheezing or increased work of breathing using Per Protocol order mode.        7-10 - enter or revise RT Bronchodilator order(s) to two equivalent RT bronchodilator orders with one order with TID Frequency and one order with Frequency of every 4 hours PRN wheezing or increased work of breathing using Per Protocol order mode.       11-13 - enter or revise RT Bronchodilator order(s) to one equivalent RT bronchodilator order with QID Frequency and an Albuterol order with Frequency of every 4 hours PRN wheezing or increased work of breathing using Per Protocol order mode.      Greater than 13 - enter or revise RT Bronchodilator order(s) to one equivalent RT bronchodilator order with every 4 hours Frequency and an Albuterol order with Frequency of every 2 hours PRN wheezing or increased work of breathing using Per Protocol order mode.     RT to enter RT Home Evaluation for COPD & MDI Assessment order using Per Protocol order mode.    Electronically signed by Layne Cortez RCP on 5/5/2024 at 8:30 PM

## 2024-05-06 NOTE — DISCHARGE SUMMARY
Discharge Summary    Name:  Shadi Carreon /Age/Sex: 1945  (78 y.o. male)   MRN & CSN:  [id number] & [id number] Admission Date/Time: 2024  8:51 PM   Attending:  Zenon Victoria MD Discharging Physician: Zenon Victoria MD     Hospital Course:   Shadi Carreon is a 78 y.o.  male  who presents with Pneumonia, bacterial    HPI  \"78 y.o. male with PMHx significant for essential hypertension, obesity, lower extremity edema and venous stasis dermatitis/chronic venous insufficiency who presented to ED with a complaint of generalized weakness and to falls over the last 48 hours.  Patient has been having cough for the past 3 weeks with sputum production.  Patient was treated for presumed pneumonia as outpatient by his provider with a course of doxycycline without improvement of his symptoms.  Chest x-ray was obtained a few days ago which showed bilateral lower lobe infiltrates.  Patient was brought to ED for further evaluation.  In ED patient was found to be hypoxic with oxygen saturation in the high 80s requiring supplemental oxygen via nasal cannula 2 L/min.  Patient was given IV Rocephin and azithromycin.  Patient is currently being admitted under inpatient status for further management and evaluation \"       The following problems have been addressed during this hospitalization.  Acute hypoxic respiratory failure 2/2 viral vs bacterial pneumonia. Suspect element of volume overload. CXR bilateral opacities stable on repeat 2024.   No sign of COPD, pt quit smoking at age 27.   Remains on O2 2-3L w with ambulation   - wean O2 as tolerated  - received symptomatic management  - abx as below  - received IV diuretics ; discharge  on oral lasix 5 days   - added IS, [place]  - received  breathing tx  - qualified for home O2 requiring 2-3L w ambulation     Sepsis 2/2 viral PNA less likely bacterial pneumonia. came w SIRS 2/4 tachycardia, fever T 100.4. CXR bilateral opacities. Had URI symptoms prior. Procal negative and no

## 2024-05-06 NOTE — PLAN OF CARE
Problem: Discharge Planning  Goal: Discharge to home or other facility with appropriate resources  5/6/2024 1906 by Petra Velazquez RN  Outcome: Completed  5/6/2024 1028 by Petra Velazquez RN  Outcome: Progressing     Problem: Safety - Adult  Goal: Free from fall injury  5/6/2024 1906 by Petra Velazquez RN  Outcome: Completed  5/6/2024 1028 by Petra Velazquez RN  Outcome: Progressing     Problem: ABCDS Injury Assessment  Goal: Absence of physical injury  5/6/2024 1906 by Petra Velazquez RN  Outcome: Completed  5/6/2024 1028 by Petra Velazquez RN  Outcome: Progressing     Problem: Nutrition Deficit:  Goal: Optimize nutritional status  5/6/2024 1906 by Petra Velazquez RN  Outcome: Completed  Flowsheets (Taken 5/6/2024 1520 by Diego Pierson, RD, LD)  Nutrient intake appropriate for improving, restoring, or maintaining nutritional needs:   Assess nutritional status and recommend course of action   Monitor oral intake, labs, and treatment plans   Recommend appropriate diets, oral nutritional supplements, and vitamin/mineral supplements  5/6/2024 1028 by Petra Velazquez RN  Outcome: Progressing     Problem: Respiratory - Adult  Goal: Achieves optimal ventilation and oxygenation  5/6/2024 1906 by Petra Velazquez RN  Outcome: Completed  5/6/2024 1028 by Petra Velazquez RN  Outcome: Progressing     Problem: Cardiovascular - Adult  Goal: Maintains optimal cardiac output and hemodynamic stability  5/6/2024 1906 by Petra Velazquez RN  Outcome: Completed  5/6/2024 1028 by Petra Velazquez RN  Outcome: Progressing  Goal: Absence of cardiac dysrhythmias or at baseline  5/6/2024 1906 by Petra Velazquez RN  Outcome: Completed  5/6/2024 1028 by Petra Velazquez RN  Outcome: Progressing     Problem: Skin/Tissue Integrity - Adult  Goal: Skin integrity remains intact  5/6/2024 1906 by Petra Velazquez RN  Outcome: Completed  5/6/2024 1028 by Petra Velazquez RN  Outcome: Progressing  Goal: Incisions, wounds, or drain sites

## 2024-05-06 NOTE — PLAN OF CARE
Problem: Discharge Planning  Goal: Discharge to home or other facility with appropriate resources  5/6/2024 1028 by Petra Velazquez RN  Outcome: Progressing  5/6/2024 0000 by Bri Galdamez RN  Outcome: Progressing     Problem: Safety - Adult  Goal: Free from fall injury  5/6/2024 1028 by Petra Velazquez RN  Outcome: Progressing  5/6/2024 0000 by Bri Galdamez RN  Outcome: Progressing     Problem: ABCDS Injury Assessment  Goal: Absence of physical injury  5/6/2024 1028 by Petra Velazquez RN  Outcome: Progressing  5/6/2024 0000 by Bri Galdamez RN  Outcome: Progressing     Problem: Nutrition Deficit:  Goal: Optimize nutritional status  5/6/2024 1028 by Petra Velazquez RN  Outcome: Progressing  5/6/2024 0000 by Bri Galdamez RN  Outcome: Progressing     Problem: Respiratory - Adult  Goal: Achieves optimal ventilation and oxygenation  5/6/2024 1028 by Petra Velazquez RN  Outcome: Progressing  5/6/2024 0000 by Bri Galdamez RN  Outcome: Progressing     Problem: Cardiovascular - Adult  Goal: Maintains optimal cardiac output and hemodynamic stability  5/6/2024 1028 by Petra Velazquez RN  Outcome: Progressing  5/6/2024 0000 by Bri Galdamez RN  Outcome: Progressing  Goal: Absence of cardiac dysrhythmias or at baseline  Outcome: Progressing     Problem: Skin/Tissue Integrity - Adult  Goal: Skin integrity remains intact  5/6/2024 1028 by Petra Velazquez RN  Outcome: Progressing  5/6/2024 0000 by Bri Galdamez RN  Outcome: Progressing  Goal: Incisions, wounds, or drain sites healing without S/S of infection  Outcome: Progressing  Goal: Oral mucous membranes remain intact  Outcome: Progressing     Problem: Musculoskeletal - Adult  Goal: Return mobility to safest level of function  5/6/2024 1028 by Petra Velazquez RN  Outcome: Progressing  5/6/2024 0000 by Bri Galdamez RN  Outcome: Progressing  Goal: Maintain proper alignment of affected body part  Outcome: Progressing  Goal: Return ADL status to a safe level of

## 2024-05-07 ENCOUNTER — CARE COORDINATION (OUTPATIENT)
Dept: CASE MANAGEMENT | Age: 79
End: 2024-05-07

## 2024-05-07 LAB
BACTERIA BLD CULT ORG #2: NORMAL
BACTERIA BLD CULT: NORMAL
BACTERIA SPEC RESP CULT: NORMAL
GRAM STN SPEC: NORMAL

## 2024-05-07 NOTE — CARE COORDINATION
Care Transitions Initial Follow Up Call    Call within 2 business days of discharge: Yes    Patient Current Location:  [place]    Care Transition Nurse contacted the patient by telephone to perform post hospital discharge assessment.  Provided introduction to self, and explanation of the Care Transition Nurse role.     Patient: Shadi Carreon Patient : 1945   MRN: [id number]  Reason for Admission: Weakness & S/P fall  Discharge Date: 24 RARS: Readmission Risk Score: 12.5      Last Discharge Facility       Date Complaint Diagnosis Description Type Department Provider    24 Fall Hypoxia ... ED to Hosp-Admission (Discharged) (ADMITTED) [facility]W Zenon Victoria MD; Burak Hennessy.            Was this an external facility discharge? No Discharge Facility: [facility]    Challenges to be reviewed by the provider   Additional needs identified to be addressed with provider: No                 Method of communication with provider: none.    Initial attempt at CT discharge phone call. Shadi states he is \"doing better.\" He states he is using his home oxygen. He states he can not talk at this time because he needs to be getting ready and getting his wife ready to go  their grandson from school. He requested a return call. CT team will follow.         Care Transitions 24 Hour Call    Care Transitions Interventions           Follow Up  Future Appointments   Date Time Provider Department Center   2024  3:30 PM Michel Gunter MD [facility] Reynaldo NUR       Care Transition Nurse provided contact information.    Plan for next call:  completion of CT discharge phone call.    Mary Alice Shanks RN BSN  Care Transition Nurse  [phone]

## 2024-05-08 ENCOUNTER — CARE COORDINATION (OUTPATIENT)
Dept: CASE MANAGEMENT | Age: 79
End: 2024-05-08

## 2024-05-08 ENCOUNTER — TELEPHONE (OUTPATIENT)
Dept: INTERNAL MEDICINE CLINIC | Age: 79
End: 2024-05-08

## 2024-05-08 DIAGNOSIS — J15.9 PNEUMONIA, BACTERIAL: Primary | ICD-10-CM

## 2024-05-08 PROCEDURE — 1111F DSCHRG MED/CURRENT MED MERGE: CPT

## 2024-05-08 NOTE — TELEPHONE ENCOUNTER
Care Transitions Initial Follow Up Call    Outreach made within 2 business days of discharge: Yes    Patient: Shadi Carreon Patient : 1945   MRN: [id number]  Reason for Admission: There are no discharge diagnoses documented for the most recent discharge.  Discharge Date: 24       Spoke with: Patient    Discharge department/facility: [facility] Interactive Patient Contact:  Was patient able to fill all prescriptions: Yes  Was patient instructed to bring all medications to the follow-up visit: Yes  Is patient taking all medications as directed in the discharge summary? Yes  Does patient understand their discharge instructions: Yes  Does patient have questions or concerns that need addressed prior to 7-14 day follow up office visit: no    Scheduled appointment with PCP within 7-14 days    Follow Up  Future Appointments   Date Time Provider Department Center   2024  3:30 PM Michel Gunter MD [facility] Reynaldo Spencer MA

## 2024-05-14 ENCOUNTER — OFFICE VISIT (OUTPATIENT)
Dept: INTERNAL MEDICINE CLINIC | Age: 79
End: 2024-05-14

## 2024-05-14 VITALS
HEIGHT: 72 IN | DIASTOLIC BLOOD PRESSURE: 76 MMHG | SYSTOLIC BLOOD PRESSURE: 134 MMHG | WEIGHT: 273 LBS | HEART RATE: 86 BPM | OXYGEN SATURATION: 93 % | BODY MASS INDEX: 36.98 KG/M2

## 2024-05-14 DIAGNOSIS — J96.01 ACUTE HYPOXIC RESPIRATORY FAILURE (HCC): ICD-10-CM

## 2024-05-14 DIAGNOSIS — Z09 HOSPITAL DISCHARGE FOLLOW-UP: Primary | ICD-10-CM

## 2024-05-14 DIAGNOSIS — E87.6 HYPOKALEMIA: ICD-10-CM

## 2024-05-14 LAB
ANION GAP SERPL CALCULATED.3IONS-SCNC: 12 MMOL/L (ref 3–16)
BUN SERPL-MCNC: 14 MG/DL (ref 7–20)
CALCIUM SERPL-MCNC: 8.8 MG/DL (ref 8.3–10.6)
CHLORIDE SERPL-SCNC: 100 MMOL/L (ref 99–110)
CO2 SERPL-SCNC: 28 MMOL/L (ref 21–32)
CREAT SERPL-MCNC: 0.8 MG/DL (ref 0.8–1.3)
GFR SERPLBLD CREATININE-BSD FMLA CKD-EPI: >90 ML/MIN/{1.73_M2}
GLUCOSE SERPL-MCNC: 110 MG/DL (ref 70–99)
POTASSIUM SERPL-SCNC: 4 MMOL/L (ref 3.5–5.1)
SODIUM SERPL-SCNC: 140 MMOL/L (ref 136–145)

## 2024-05-14 ASSESSMENT — ENCOUNTER SYMPTOMS
SHORTNESS OF BREATH: 0
BLOOD IN STOOL: 0
NAUSEA: 0
SORE THROAT: 0
ABDOMINAL PAIN: 0
COUGH: 0
VOMITING: 0

## 2024-05-14 NOTE — PROGRESS NOTES
Post-Discharge Transitional Care Follow Up      Shadi Carreon   YOB: 1945    Date of Office Visit:  5/14/2024  Date of Hospital Admission: 5/2/24  Date of Hospital Discharge: 5/6/24  Readmission Risk Score (high >=14%. Medium >=10%):Readmission Risk Score: 12.5      Care management risk score Rising risk (score 2-5) and Complex Care (Scores >=6): No Risk Score On File     Non face to face  following discharge, date last encounter closed (first attempt may have been earlier): 05/08/2024     Call initiated 2 business days of discharge: Yes     Hospital discharge follow-up  -     WA DISCHARGE MEDS RECONCILED W/ CURRENT OUTPATIENT MED LIST  Acute hypoxic respiratory failure (HCC)  Improving  Probably multifactorial due to acute infection and volume overload  Patient improved after completing antibiotic therapy with Augmentin and azithromycin and short course of furosemide.  Echocardiogram performed in the hospital did not show any signs of left heart failure.  Image quality was poor.  Continue home oxygen as needed to maintain oxygen saturation above 94%.  Patient has an upcoming appointment with pulmonology for further evaluation.  Patient is following with a cardiologist, advised patient to check with his cardiologist about further management for possible volume overload.  Advised patient to call us back or go to emergency if there is any chest pain, shortness of breath, lightheadedness or palpitations.  Hypokalemia  -     Basic Metabolic Panel    Medical Decision Making: moderate complexity  Return if symptoms worsen or fail to improve.           Subjective:   HPI    Inpatient course: Discharge summary reviewed- see chart.    Interval history/Current status: Patient is feeling well after discharge from the hospital. Uses home oxygen if oxygen saturation is less than 94%  Completed antibiotics (Augmentin) as prescribed from hospital.     Patient Active Problem List   Diagnosis    Sleep apnea    Edema

## 2024-05-15 ENCOUNTER — CARE COORDINATION (OUTPATIENT)
Dept: CASE MANAGEMENT | Age: 79
End: 2024-05-15

## 2024-05-15 NOTE — CARE COORDINATION
Care Transitions Follow Up Call    Patient Current Location:  Home: [address] [place] [id number]    [facility] Care Coordinator contacted the patient by telephone to follow up after admission on 2024.  Verified name and  with patient as identifiers.    Patient: Shadi Carreon  Patient : 1945   MRN: [id number]  Reason for Admission: Weakness - fall   Discharge Date: 24 RARS: Readmission Risk Score: 12.5      Needs to be reviewed by the provider   Additional needs identified to be addressed with provider: No  none             Method of communication with provider: none.  Patient reports he is feeling fine. Had PCP f/u yesterday and he is progressing well. No home care needed. Uses a quad cane for mobility. Taking medication as prescribed. Denies sob, fever, chills, weakness, ha, lh, nvd, falls or dizziness. Swelling has gone down in his legs and he is coughing up very little mucous. Appetite is coming back, fluid intake is good. Denies problems with bladder or bowel. Pulse ox runs 94-98%. No questions or needs at this time.       Addressed changes since last contact:  none  Discussed follow-up appointments. If no appointment was previously scheduled, appointment scheduling offered: Yes.   Is follow up appointment scheduled within 7 days of discharge? Yes.    Follow Up  Future Appointments   Date Time Provider Department Center   2024  9:40 AM Robert Gallo DO [facility]   2024  3:30 PM Michel Gunter MD [facility] [place] - DYD     External follow up appointment(s):     [facility] Care Coordinator reviewed medical action plan and red flags with patient and discussed any barriers to care and/or understanding of plan of care after discharge. Discussed appropriate site of care based on symptoms and resources available to patient including: PCP  Specialist  Urgent care clinics  When to call 911. The patient agrees to contact the PCP office for questions related to their

## 2024-05-20 ENCOUNTER — OFFICE VISIT (OUTPATIENT)
Dept: PULMONOLOGY | Age: 79
End: 2024-05-20
Payer: MEDICARE

## 2024-05-20 VITALS
TEMPERATURE: 97.7 F | RESPIRATION RATE: 18 BRPM | OXYGEN SATURATION: 96 % | DIASTOLIC BLOOD PRESSURE: 82 MMHG | BODY MASS INDEX: 23.43 KG/M2 | WEIGHT: 173 LBS | HEIGHT: 72 IN | HEART RATE: 99 BPM | SYSTOLIC BLOOD PRESSURE: 132 MMHG

## 2024-05-20 DIAGNOSIS — R93.89 ABNORMAL RADIOGRAPH: Primary | ICD-10-CM

## 2024-05-20 DIAGNOSIS — J96.01 ACUTE RESPIRATORY FAILURE WITH HYPOXIA (HCC): ICD-10-CM

## 2024-05-20 PROCEDURE — 99204 OFFICE O/P NEW MOD 45 MIN: CPT | Performed by: INTERNAL MEDICINE

## 2024-05-20 PROCEDURE — 1123F ACP DISCUSS/DSCN MKR DOCD: CPT | Performed by: INTERNAL MEDICINE

## 2024-05-20 PROCEDURE — 3079F DIAST BP 80-89 MM HG: CPT | Performed by: INTERNAL MEDICINE

## 2024-05-20 PROCEDURE — 3075F SYST BP GE 130 - 139MM HG: CPT | Performed by: INTERNAL MEDICINE

## 2024-05-20 NOTE — PROGRESS NOTES
PATIENT IS SEEN AT THE REQUEST OF Hospital services post discharged     Chief complaint  This is a 78 y.o. year old male  who comes to see me with a chief complaint of   Chief Complaint   Patient presents with    New Patient     Admitted into hosp 5/2/2024- Pneumonia    Follow-Up from Hospital       HPI  Here with a cc of hospital follow up    He was recently admitted for pneumonia and fluid on his lungs.  He was treated for infection and diuresed.  He was sent home on oxygen and was told to follow up with me.  He never has had a breathing issue and has weaned himself off of the oxygen as of 3 days ago.  He denies any history of lung disease.  He is not entirely sure what happened but regardless is better.  Quit many years ago with regards to smoking         Past Medical History:   Diagnosis Date    Amnesia 01/78    Arthritis     Erythema     Antral    Essential thrombocythemia (HCC)     HBP (high blood pressure)     Nodule     Stomach       Past Surgical History:   Procedure Laterality Date    BACK SURGERY      BONE MARROW BIOPSY      CARPAL TUNNEL RELEASE      COLONOSCOPY  5/09    Polypectomy    COLONOSCOPY  1/15    normal -     JOINT REPLACEMENT Left     JOINT REPLACEMENT Right 08/20/2019    RIGHT LATERAL TOTAL HIP REPLACEMENT (Right Hip)    KNEE SURGERY  1969    Left knee    KNEE SURGERY Left     TOTAL HIP ARTHROPLASTY Right 8/20/2019    RIGHT LATERAL TOTAL HIP REPLACEMENT performed by Oscar Navarro MD at [facility] OR             Current Outpatient Medications:     fluticasone (FLONASE) 50 MCG/ACT nasal spray, 1 spray by Each Nostril route daily, Disp: 16 g, Rfl: 2    tamsulosin (FLOMAX) 0.4 MG capsule, TAKE 1 CAPSULE BY MOUTH EVERY DAY, Disp: 90 capsule, Rfl: 3    triamterene-hydroCHLOROthiazide (MAXZIDE-25) 37.5-25 MG per tablet, TAKE 1/2 TABLET BY MOUTH DAILY, Disp: 45 tablet, Rfl: 1    Handicap Placard MISC, by Does not apply route, Disp: 1 each, Rfl: 0    Omega-3 Fatty Acids (FISH OIL) 1000 MG  Opzelura Pregnancy And Lactation Text: There is insufficient data to evaluate drug-associated risk for major birth defects, miscarriage, or other adverse maternal or fetal outcomes.  There is a pregnancy registry that monitors pregnancy outcomes in pregnant persons exposed to the medication during pregnancy.  It is unknown if this medication is excreted in breast milk.  Do not breastfeed during treatment and for about 4 weeks after the last dose.

## 2024-05-20 NOTE — PATIENT INSTRUCTIONS
Will send order to [facility] to discontinue oxygen    Chest x-ray in one month    Follow up in 4 months

## 2024-05-22 ENCOUNTER — TELEPHONE (OUTPATIENT)
Dept: INTERNAL MEDICINE CLINIC | Age: 79
End: 2024-05-22

## 2024-05-22 DIAGNOSIS — R26.89 BALANCE PROBLEM: Primary | ICD-10-CM

## 2024-05-22 NOTE — TELEPHONE ENCOUNTER
Pt called back and is now wanting a new order for a rolling walker chair for patient. He said the size should work for him. I will fax to [employer] /l?k â€¢ splik/ once placed.

## 2024-05-23 ENCOUNTER — CARE COORDINATION (OUTPATIENT)
Dept: CASE MANAGEMENT | Age: 79
End: 2024-05-23

## 2024-05-23 NOTE — CARE COORDINATION
Care Transitions Follow Up Call    Patient Current Location:  Home: [address] [place] [id number]    [facility] Care Coordinator contacted the patient by telephone to follow up after admission on 2024.  Verified name and  with patient as identifiers.    Patient: Shadi Carreon  Patient : 1945   MRN: [id number]  Reason for Admission: Weakness - fall   Discharge Date: 24 RARS: Readmission Risk Score: 12.5      Needs to be reviewed by the provider   Additional needs identified to be addressed with provider: No  none             Method of communication with provider: none.    Patient stated he is doing good. He had pulmonary f/u visit. He denies sob, chills, cough, cp, fever, ha, weakness, dizziness, falls or swelling. He is driving now. Good appetite and fluid intake. No longer on O2. Pulse ox runs 94-96%. No elimination problems with bladder or bowel. No questions or needs.     Addressed changes since last contact:  none  Discussed follow-up appointments. If no appointment was previously scheduled, appointment scheduling offered: No.   Is follow up appointment scheduled within 7 days of discharge? Yes.    Follow Up  Future Appointments   Date Time Provider Department Center   2024  3:30 PM Michel Gunter MD [facility] [place] - [facility]   10/2/2024  9:00 AM Robert Gallo DO [facility]     External follow up appointment(s):     N Care Coordinator reviewed medical action plan and red flags with patient and discussed any barriers to care and/or understanding of plan of care after discharge. Discussed appropriate site of care based on symptoms and resources available to patient including: PCP  Specialist  Urgent care clinics  When to call 911. The patient agrees to contact the PCP office for questions related to their healthcare.     Advance Care Planning:   reviewed and current.   Advance Care Planning   Healthcare Decision Maker:    Primary Decision Maker: Mai Carreon - Spouse

## 2024-05-28 NOTE — TELEPHONE ENCOUNTER
Medication:   Requested Prescriptions     Pending Prescriptions Disp Refills    mupirocin (BACTROBAN) 2 % ointment 30 g 0     Sig: Apply topically 3 times daily.       Last Filled:      Patient Phone Number: [phone] (home) [phone] (work)    Last appt: 5/14/2024   Next appt: 8/1/2024  Future Appointments   Date Time Provider Department Center   8/1/2024  3:30 PM Michel Gunter MD [facility] [place] - [facility]   10/2/2024  9:00 AM Robert Gallo DO  [facility]

## 2024-05-28 NOTE — TELEPHONE ENCOUNTER
Pt is requesting a refill for rmupirocin (BACTROBAN) 2 % ointment     Would this be available in a larger tube? Does not last very long    Please advise     Please send to [facility] [address]    Thank you

## 2024-05-31 ENCOUNTER — CARE COORDINATION (OUTPATIENT)
Dept: CASE MANAGEMENT | Age: 79
End: 2024-05-31

## 2024-05-31 NOTE — CARE COORDINATION
Care Transitions Follow Up Call    Patient Current Location:  [address] [address]    Care Transition Nurse contacted the patient by telephone to follow up after admission on 2024.  Verified name and  with patient as identifiers.    Patient: Shadi Carreon  Patient : 1945   MRN: [id number]  Reason for Admission: Weakness & fall  Discharge Date: 24 RARS: Readmission Risk Score: 12.5      Needs to be reviewed by the provider   Additional needs identified to be addressed with provider: No               Method of communication with provider: none.    Final attempt at CT follow up phone call. Shadi states he is doing \"just fine.\" He states he has been off of oxygen for several weeks without difficulty. He states he is up and about and there is no C at this time. Shadi denies any needs at this time. Informed him that this would be the final CTN outreach. Encouraged him to contact his PCP with any future medical issues or concerns. Shadi verbalized understanding. He declined any additional CM. Shadi was grateful for the follow up.      Follow Up  Future Appointments   Date Time Provider Department Center   2024  3:30 PM Michel Gunter MD [facility] [place] - [facility]   10/2/2024  9:00 AM Robert Gallo DO [facility]          Care Transitions Subsequent and Final Call    Subsequent and Final Calls  Care Transitions Interventions  Other Interventions:             Care Transition Nurse provided contact information for future needs. No further follow-up call indicated based on severity of symptoms and risk factors.    Mary Alice Shanks RN BSN  Care Transition Nurse  [phone]

## 2024-06-03 DIAGNOSIS — N40.1 BENIGN PROSTATIC HYPERPLASIA WITH LOWER URINARY TRACT SYMPTOMS, SYMPTOM DETAILS UNSPECIFIED: ICD-10-CM

## 2024-06-03 RX ORDER — TAMSULOSIN HYDROCHLORIDE 0.4 MG/1
0.8 CAPSULE ORAL DAILY
Qty: 180 CAPSULE | Refills: 1 | Status: SHIPPED | OUTPATIENT
Start: 2024-06-03

## 2024-06-03 NOTE — TELEPHONE ENCOUNTER
Future Appointments   Date Time Provider Department Center   10/2/2024  9:00 AM Robert Gallo DO  PULM [facility]       Last appt 5/14/24

## 2024-06-11 ENCOUNTER — OFFICE VISIT (OUTPATIENT)
Dept: INTERNAL MEDICINE CLINIC | Age: 79
End: 2024-06-11
Payer: MEDICARE

## 2024-06-11 VITALS
WEIGHT: 290 LBS | TEMPERATURE: 98 F | OXYGEN SATURATION: 98 % | HEART RATE: 88 BPM | SYSTOLIC BLOOD PRESSURE: 151 MMHG | BODY MASS INDEX: 39.33 KG/M2 | DIASTOLIC BLOOD PRESSURE: 75 MMHG

## 2024-06-11 DIAGNOSIS — L53.9 ERYTHEMA OF LOWER EXTREMITY: ICD-10-CM

## 2024-06-11 DIAGNOSIS — R22.43 LOCALIZED SWELLING OF BOTH LOWER LEGS: Primary | ICD-10-CM

## 2024-06-11 DIAGNOSIS — D64.9 ANEMIA, UNSPECIFIED TYPE: ICD-10-CM

## 2024-06-11 LAB
ALBUMIN SERPL-MCNC: 4.2 G/DL (ref 3.4–5)
ALBUMIN/GLOB SERPL: 1.9 {RATIO} (ref 1.1–2.2)
ALP SERPL-CCNC: 48 U/L (ref 40–129)
ALT SERPL-CCNC: 29 U/L (ref 10–40)
ANION GAP SERPL CALCULATED.3IONS-SCNC: 10 MMOL/L (ref 3–16)
AST SERPL-CCNC: 24 U/L (ref 15–37)
BASOPHILS # BLD: 0 K/UL (ref 0–0.2)
BASOPHILS NFR BLD: 0.9 %
BILIRUB SERPL-MCNC: 0.6 MG/DL (ref 0–1)
BUN SERPL-MCNC: 14 MG/DL (ref 7–20)
CALCIUM SERPL-MCNC: 9.1 MG/DL (ref 8.3–10.6)
CHLORIDE SERPL-SCNC: 104 MMOL/L (ref 99–110)
CO2 SERPL-SCNC: 25 MMOL/L (ref 21–32)
CREAT SERPL-MCNC: 0.7 MG/DL (ref 0.8–1.3)
DEPRECATED RDW RBC AUTO: 16.4 % (ref 12.4–15.4)
EOSINOPHIL # BLD: 0.2 K/UL (ref 0–0.6)
EOSINOPHIL NFR BLD: 4.6 %
FERRITIN SERPL IA-MCNC: 228.4 NG/ML (ref 30–400)
FOLATE SERPL-MCNC: 13.58 NG/ML (ref 4.78–24.2)
GFR SERPLBLD CREATININE-BSD FMLA CKD-EPI: >90 ML/MIN/{1.73_M2}
GLUCOSE SERPL-MCNC: 105 MG/DL (ref 70–99)
HCT VFR BLD AUTO: 34.1 % (ref 40.5–52.5)
HGB BLD-MCNC: 11.9 G/DL (ref 13.5–17.5)
IRON SATN MFR SERPL: 20 % (ref 20–50)
IRON SERPL-MCNC: 48 UG/DL (ref 59–158)
LYMPHOCYTES # BLD: 0.8 K/UL (ref 1–5.1)
LYMPHOCYTES NFR BLD: 15.2 %
MCH RBC QN AUTO: 35.3 PG (ref 26–34)
MCHC RBC AUTO-ENTMCNC: 34.9 G/DL (ref 31–36)
MCV RBC AUTO: 101.1 FL (ref 80–100)
MONOCYTES # BLD: 0.3 K/UL (ref 0–1.3)
MONOCYTES NFR BLD: 6.3 %
NEUTROPHILS # BLD: 3.8 K/UL (ref 1.7–7.7)
NEUTROPHILS NFR BLD: 73 %
PLATELET # BLD AUTO: 263 K/UL (ref 135–450)
PMV BLD AUTO: 7.3 FL (ref 5–10.5)
POTASSIUM SERPL-SCNC: 3.8 MMOL/L (ref 3.5–5.1)
PROT SERPL-MCNC: 6.4 G/DL (ref 6.4–8.2)
RBC # BLD AUTO: 3.37 M/UL (ref 4.2–5.9)
SODIUM SERPL-SCNC: 139 MMOL/L (ref 136–145)
TIBC SERPL-MCNC: 238 UG/DL (ref 260–445)
VIT B12 SERPL-MCNC: 449 PG/ML (ref 211–911)
WBC # BLD AUTO: 5.2 K/UL (ref 4–11)

## 2024-06-11 PROCEDURE — 1123F ACP DISCUSS/DSCN MKR DOCD: CPT | Performed by: INTERNAL MEDICINE

## 2024-06-11 PROCEDURE — 3077F SYST BP >= 140 MM HG: CPT | Performed by: INTERNAL MEDICINE

## 2024-06-11 PROCEDURE — 36415 COLL VENOUS BLD VENIPUNCTURE: CPT | Performed by: INTERNAL MEDICINE

## 2024-06-11 PROCEDURE — G2211 COMPLEX E/M VISIT ADD ON: HCPCS | Performed by: INTERNAL MEDICINE

## 2024-06-11 PROCEDURE — 99214 OFFICE O/P EST MOD 30 MIN: CPT | Performed by: INTERNAL MEDICINE

## 2024-06-11 PROCEDURE — 3078F DIAST BP <80 MM HG: CPT | Performed by: INTERNAL MEDICINE

## 2024-06-11 RX ORDER — POTASSIUM CHLORIDE 750 MG/1
10 TABLET, EXTENDED RELEASE ORAL DAILY
Qty: 10 TABLET | Refills: 0 | Status: SHIPPED | OUTPATIENT
Start: 2024-06-11 | End: 2024-06-21

## 2024-06-11 RX ORDER — FUROSEMIDE 20 MG/1
20 TABLET ORAL DAILY
Qty: 10 TABLET | Refills: 0 | Status: SHIPPED | OUTPATIENT
Start: 2024-06-11 | End: 2024-06-21

## 2024-06-11 ASSESSMENT — ENCOUNTER SYMPTOMS
SHORTNESS OF BREATH: 0
ABDOMINAL PAIN: 0
COUGH: 0
SORE THROAT: 0
NAUSEA: 0
VOMITING: 0
BLOOD IN STOOL: 0

## 2024-06-11 NOTE — PROGRESS NOTES
Shadi Carreon (:  1945) is a 78 y.o. male, Established patient, here for evaluation of the following chief complaint(s):  Leg Swelling (Both swelling )      Assessment & Plan   ASSESSMENT/PLAN:  1. Localized swelling of both lower legs  Acute, uncontrolled  Started a week ago  Patient complains of worsening bilateral leg swelling for the past 1 week  He denies any chest pain, shortness of breath or lightheadedness or palpitations.  Patient complains of mild pain in both legs  Patient was in the hospital a month ago for acute hypoxic respiratory failure and was treated with a trial of furosemide for volume overload and patient improved following that.  Echocardiogram performed in the hospital in May 2024 did not show any findings of heart failure  Recent blood work in May 2024 did not reveal any reduced kidney function.  Etiology of the swelling is unclear, possible venous insufficiency  Ordered ultrasound of both lower extremities to rule out DVT  Prescribed furosemide 20 mg daily for diuresis.  Ordered CBC and CMP for evaluation, will treat based on results  -     CBC with Auto Differential; Future  -     Comprehensive Metabolic Panel; Future  -     Vascular duplex lower extremity venous bilateral; Future  -     furosemide (LASIX) 20 MG tablet; Take 1 tablet by mouth daily for 10 days, Disp-10 tablet, R-0Normal  -     potassium chloride (KLOR-CON M) 10 MEQ extended release tablet; Take 1 tablet by mouth daily for 10 days, Disp-10 tablet, R-0Normal  2. Erythema of lower extremity  Patient has erythema over the swelling in both legs  Patient denies any fever or severe leg pain  Erythema probably due to venous stasis  Prescribed topical mupirocin for empiric therapy.  -     mupirocin (BACTROBAN) 2 % ointment; Apply topically 3 times daily., Disp-30 g, R-0, Normal    Advised patient to call us back if there is any severe leg pain, worsening swelling or any new symptoms.    Return in about 1 week (around

## 2024-06-12 ENCOUNTER — HOSPITAL ENCOUNTER (OUTPATIENT)
Dept: VASCULAR LAB | Age: 79
Discharge: HOME | End: 2024-06-14
Attending: INTERNAL MEDICINE
Payer: MEDICARE

## 2024-06-12 ENCOUNTER — HOSPITAL ENCOUNTER (OUTPATIENT)
Dept: GENERAL RADIOLOGY | Age: 79
Discharge: HOME | End: 2024-06-12
Attending: INTERNAL MEDICINE
Payer: MEDICARE

## 2024-06-12 ENCOUNTER — HOSPITAL ENCOUNTER (OUTPATIENT)
Age: 79
Discharge: HOME | End: 2024-06-12
Attending: INTERNAL MEDICINE
Payer: MEDICARE

## 2024-06-12 DIAGNOSIS — R22.43 LOCALIZED SWELLING OF BOTH LOWER LEGS: ICD-10-CM

## 2024-06-12 DIAGNOSIS — R93.89 ABNORMAL RADIOGRAPH: ICD-10-CM

## 2024-06-12 PROCEDURE — 93970 EXTREMITY STUDY: CPT

## 2024-06-12 PROCEDURE — 71046 X-RAY EXAM CHEST 2 VIEWS: CPT

## 2024-06-18 ENCOUNTER — OFFICE VISIT (OUTPATIENT)
Dept: INTERNAL MEDICINE CLINIC | Age: 79
End: 2024-06-18

## 2024-06-18 VITALS
BODY MASS INDEX: 38.87 KG/M2 | WEIGHT: 287 LBS | HEIGHT: 72 IN | DIASTOLIC BLOOD PRESSURE: 78 MMHG | SYSTOLIC BLOOD PRESSURE: 128 MMHG | HEART RATE: 85 BPM | OXYGEN SATURATION: 98 %

## 2024-06-18 DIAGNOSIS — R22.43 LOCALIZED SWELLING OF BOTH LOWER LEGS: Primary | ICD-10-CM

## 2024-06-18 DIAGNOSIS — L53.9 ERYTHEMA OF LOWER EXTREMITY: ICD-10-CM

## 2024-06-18 LAB
ANION GAP SERPL CALCULATED.3IONS-SCNC: 10 MMOL/L (ref 3–16)
BUN SERPL-MCNC: 18 MG/DL (ref 7–20)
CALCIUM SERPL-MCNC: 9.1 MG/DL (ref 8.3–10.6)
CHLORIDE SERPL-SCNC: 103 MMOL/L (ref 99–110)
CO2 SERPL-SCNC: 27 MMOL/L (ref 21–32)
CREAT SERPL-MCNC: 0.9 MG/DL (ref 0.8–1.3)
GFR SERPLBLD CREATININE-BSD FMLA CKD-EPI: 88 ML/MIN/{1.73_M2}
GLUCOSE SERPL-MCNC: 90 MG/DL (ref 70–99)
POTASSIUM SERPL-SCNC: 4 MMOL/L (ref 3.5–5.1)
SODIUM SERPL-SCNC: 140 MMOL/L (ref 136–145)

## 2024-06-18 RX ORDER — POTASSIUM CHLORIDE 750 MG/1
10 TABLET, EXTENDED RELEASE ORAL DAILY
Qty: 30 TABLET | Refills: 0 | Status: SHIPPED | OUTPATIENT
Start: 2024-06-18 | End: 2024-07-18

## 2024-06-18 RX ORDER — FUROSEMIDE 20 MG/1
20 TABLET ORAL DAILY
Qty: 30 TABLET | Refills: 0 | Status: SHIPPED | OUTPATIENT
Start: 2024-06-18 | End: 2024-07-18

## 2024-06-18 RX ORDER — CEPHALEXIN 500 MG/1
500 CAPSULE ORAL 4 TIMES DAILY
Qty: 28 CAPSULE | Refills: 0 | Status: SHIPPED | OUTPATIENT
Start: 2024-06-18 | End: 2024-06-25

## 2024-06-18 ASSESSMENT — ENCOUNTER SYMPTOMS
VOMITING: 0
ABDOMINAL PAIN: 0
NAUSEA: 0
COUGH: 0
SHORTNESS OF BREATH: 0
BLOOD IN STOOL: 0
SORE THROAT: 0

## 2024-06-18 NOTE — PROGRESS NOTES
Shadi Carreon (:  1945) is a 78 y.o. male, Established patient, here for evaluation of the following chief complaint(s):  Follow-up and Leg Swelling      Assessment & Plan   ASSESSMENT/PLAN:  1. Localized swelling of both lower legs  Acute, improving after starting Lasix  Patient was started on Lasix 1 week ago that improved his bilateral leg swelling.  However patient has increased erythema in his both legs.  Ultrasound of bilateral legs that was performed 1 week ago did not reveal any DVT.  Patient's recent echocardiogram did not show any findings consistent with heart failure, recent kidney and liver function test were normal.  Leg swelling is probably due to venous insufficiency, advised patient to continue Lasix for 1 or 2 more weeks until his leg swelling improves and use compression stockings and perform leg elevation daily.  He denies any chest pain, shortness of breath or lightheadedness or palpitations.  Referring to vascular surgery for further management of leg swelling.  -     Basic Metabolic Panel  -     furosemide (LASIX) 20 MG tablet; Take 1 tablet by mouth daily, Disp-30 tablet, R-0Normal  -     potassium chloride (KLOR-CON M) 10 MEQ extended release tablet; Take 1 tablet by mouth daily, Disp-30 tablet, R-0Normal  -     Thomas Meadows MD, Vascular/Endovascular Surgery, [facility]    2. Erythema of lower extremity  Patient has erythema over the swelling in both legs  Erythema has worsened since last visit.  Patient denies any fever or severe leg pain  Prescribed cephalexin to treat possible skin infection secondary to venous stasis.  -     Basic Metabolic Panel  -     cephALEXin (KEFLEX) 500 MG capsule; Take 1 capsule by mouth 4 times daily for 7 days, Disp-28 capsule, R-0Normal  -     mupirocin (BACTROBAN) 2 % ointment; Apply topically 3 times daily., Disp-150 g, R-0, Normal    Advised patient to call us back if there is any severe leg pain, worsening swelling or any new

## 2024-06-23 DIAGNOSIS — R05.2 SUBACUTE COUGH: ICD-10-CM

## 2024-06-24 RX ORDER — FLUTICASONE PROPIONATE 50 MCG
SPRAY, SUSPENSION (ML) NASAL
Qty: 1 EACH | Refills: 2 | Status: SHIPPED | OUTPATIENT
Start: 2024-06-24

## 2024-06-24 NOTE — TELEPHONE ENCOUNTER
Future Appointments   Date Time Provider Department Center   8/2/2024  2:30 PM Rafael Conn MD [facility] [place] [facility]   10/2/2024  9:00 AM Robert Gallo DO [facility]       Last appt 6/11/24

## 2024-06-25 ENCOUNTER — TELEPHONE (OUTPATIENT)
Dept: VASCULAR SURGERY | Age: 79
End: 2024-06-25

## 2024-06-25 NOTE — TELEPHONE ENCOUNTER
Patient called referred by Michel Gunter MD for discoloration and swelling bilateral lower extremities. He was put on antibiotics and given antibiotic cream. He says they have not improved. He needs an apt to see Dr Murrieta but needs apt sooner than open apt. I told him would discuss with Kira and she would get back with him. His call back number is [phone].

## 2024-06-26 NOTE — TELEPHONE ENCOUNTER
Spoke to patient.  He states he needed to be seen sooner. He has BLE swelling and discoloration.  He was prescribed Keflex and Bactroban cream, but states the cream is not working.  Last few days, there has been some seeping of liquid.  He did state that he can not wear compression socks due to them being hard to get on and off.  I told him to try ACE wraps for the time being.  Patient understood.  He is scheduled with Stef Stephens.

## 2024-06-28 ENCOUNTER — OFFICE VISIT (OUTPATIENT)
Dept: VASCULAR SURGERY | Age: 79
End: 2024-06-28
Payer: MEDICARE

## 2024-06-28 VITALS
OXYGEN SATURATION: 98 % | DIASTOLIC BLOOD PRESSURE: 84 MMHG | SYSTOLIC BLOOD PRESSURE: 149 MMHG | BODY MASS INDEX: 38.29 KG/M2 | HEART RATE: 90 BPM | TEMPERATURE: 97.8 F | WEIGHT: 282.4 LBS

## 2024-06-28 DIAGNOSIS — R22.43 LOCALIZED SWELLING OF BOTH LOWER LEGS: Primary | ICD-10-CM

## 2024-06-28 PROCEDURE — 29580 STRAPPING UNNA BOOT: CPT | Performed by: NURSE PRACTITIONER

## 2024-06-28 RX ORDER — ASPIRIN 81 MG/1
81 TABLET ORAL DAILY
COMMUNITY

## 2024-06-28 NOTE — PATIENT INSTRUCTIONS
Return in 3 days (on 7/1/2024 at 4:15 p.m.) for leg swelling.      PATIENT EDUCATION focused on need for Unna boot therapy for compression.  They support vascular problems, help to heal leg ulcers and control leg swelling.  The dressings can be worn up to a week before they need changed.  Patient must keep the Unna boots dry.

## 2024-06-28 NOTE — PROGRESS NOTES
Shadi Carreon (:  1945) is a 78 y.o. male,New patient, here for evaluation of the following chief complaint(s):  Leg Swelling (/)      HPI      Edema  The edema is located in the bilateral lower extremity edema.  The patient reports significant swelling in his bilateral LE's.  The patient has not experienced any new symptoms since last visit.  The patient states the edema is recurrent.  The patient had a venous doppler that was negative for DVT on 24.  Treatment so far includes: Lasix 20 mg  1 tab PO daily.and Keflex 500 mg  1 cap PO   QID X7d.      Review of Systems   Constitutional:  Negative for chills and fever.   Cardiovascular:  Positive for leg swelling.   Skin:  Positive for color change. Negative for wound.   All other systems reviewed and are negative.      No Known Allergies    Prior to Visit Medications    Medication Sig Taking? Authorizing Provider   aspirin 81 MG EC tablet Take 1 tablet by mouth daily Yes Guy Hanson MD   furosemide (LASIX) 20 MG tablet Take 1 tablet by mouth daily Yes Michel Gunter MD   tamsulosin (FLOMAX) 0.4 MG capsule TAKE 2 CAPSULES BY MOUTH EVERY DAY Yes Michel Gunter MD   triamterene-hydroCHLOROthiazide (MAXZIDE-25) 37.5-25 MG per tablet TAKE 1/2 TABLET BY MOUTH DAILY Yes Rafael Conn MD   Handicap Placard MISC by Does not apply route Yes Pankaj Loredo MD   Omega-3 Fatty Acids (FISH OIL) 1000 MG CAPS Take 3 capsules by mouth daily Yes Guy Hanson MD   vitamin D (CHOLECALCIFEROL) 1000 UNIT TABS tablet Take 1 tablet by mouth Yes Guy Hanson MD   hydroxyurea (HYDREA) 500 MG chemo capsule TAKE TWO CAPSULES BY MOUTH DAILY Yes Alexandra Walker MD   IRON PO Take 65 mg by mouth daily. Yes Guy Hanson MD   fluticasone (FLONASE) 50 MCG/ACT nasal spray SPRAY 1 SPRAY INTO EACH NOSTRIL EVERY DAY  Patient not taking: Reported on 2024  Michel Gunter MD   potassium chloride (KLOR-CON M) 10

## 2024-07-01 ENCOUNTER — OFFICE VISIT (OUTPATIENT)
Dept: VASCULAR SURGERY | Age: 79
End: 2024-07-01
Payer: MEDICARE

## 2024-07-01 VITALS — DIASTOLIC BLOOD PRESSURE: 76 MMHG | OXYGEN SATURATION: 96 % | SYSTOLIC BLOOD PRESSURE: 155 MMHG | HEART RATE: 89 BPM

## 2024-07-01 DIAGNOSIS — R22.43 LOCALIZED SWELLING OF BOTH LOWER LEGS: Primary | ICD-10-CM

## 2024-07-01 PROBLEM — M79.89 LEG SWELLING: Status: ACTIVE | Noted: 2024-07-01

## 2024-07-01 PROCEDURE — 29580 STRAPPING UNNA BOOT: CPT | Performed by: NURSE PRACTITIONER

## 2024-07-01 ASSESSMENT — ENCOUNTER SYMPTOMS
COLOR CHANGE: 1
COLOR CHANGE: 1

## 2024-07-01 NOTE — PATIENT INSTRUCTIONS
Return in about 1 week (around 7/8/2024) for bilateral LE edema.      PATIENT EDUCATION focused on need for continued Unna boot therapy for compression.  They support vascular problems, help to heal leg ulcers and control leg swelling.  The dressings can be worn up to a week before they need changed.  Patient must keep the Unna boots dry.

## 2024-07-01 NOTE — PROGRESS NOTES
Shadi Carreon (:  1945) is a 78 y.o. male,New patient, here for evaluation of the following chief complaint(s):  Leg Swelling (FU on bilateral LE edema and to have Unna boots changed)    HPI    Edema  The edema is located in the bilateral lower extremity edema.  The patient reports significant swelling in his bilateral LE's.  The patient has not experienced any new symptoms since last visit.  The patient states the edema is recurrent.  The patient had a venous doppler that was negative for DVT on 24.  Treatment so far includes: Bilateral Unna boots, Lasix and Keflex (per his PCP).      Review of Systems   Constitutional:  Negative for chills and fever.   Cardiovascular:  Positive for leg swelling.   Skin:  Positive for color change. Negative for wound.   All other systems reviewed and are negative.      No Known Allergies    Prior to Visit Medications    Medication Sig Taking? Authorizing Provider   aspirin 81 MG EC tablet Take 1 tablet by mouth daily  Guy Hanson MD   fluticasone (FLONASE) 50 MCG/ACT nasal spray SPRAY 1 SPRAY INTO EACH NOSTRIL EVERY DAY  Patient not taking: Reported on 2024  Michel Gunter MD   furosemide (LASIX) 20 MG tablet Take 1 tablet by mouth daily  Michel Gunter MD   potassium chloride (KLOR-CON M) 10 MEQ extended release tablet Take 1 tablet by mouth daily  Patient not taking: Reported on 2024  Michel Gunter MD   tamsulosin (FLOMAX) 0.4 MG capsule TAKE 2 CAPSULES BY MOUTH EVERY DAY  Michel Gunter MD   triamterene-hydroCHLOROthiazide (MAXZIDE-25) 37.5-25 MG per tablet TAKE 1/2 TABLET BY MOUTH DAILY  Rafael Conn MD   Handicap Placard MISC by Does not apply route  Pankaj Loredo MD   Omega-3 Fatty Acids (FISH OIL) 1000 MG CAPS Take 3 capsules by mouth daily  Guy Hanson MD   vitamin D (CHOLECALCIFEROL) 1000 UNIT TABS tablet Take 1 tablet by mouth  Guy Hanson MD   hydroxyurea (HYDREA) 500

## 2024-07-02 DIAGNOSIS — J06.9 URTI (ACUTE UPPER RESPIRATORY INFECTION): ICD-10-CM

## 2024-07-02 DIAGNOSIS — R05.1 ACUTE COUGH: ICD-10-CM

## 2024-07-02 DIAGNOSIS — L53.9 ERYTHEMA OF LOWER EXTREMITY: ICD-10-CM

## 2024-07-02 RX ORDER — AMOXICILLIN 500 MG/1
1000 CAPSULE ORAL 3 TIMES DAILY
Qty: 30 CAPSULE | Refills: 0 | OUTPATIENT
Start: 2024-07-02 | End: 2024-07-07

## 2024-07-02 RX ORDER — MUPIROCIN 20 MG/G
OINTMENT TOPICAL
Qty: 22 G | OUTPATIENT
Start: 2024-07-02

## 2024-07-02 RX ORDER — METRONIDAZOLE 500 MG/1
500 TABLET ORAL 2 TIMES DAILY
Qty: 14 TABLET | Refills: 0 | OUTPATIENT
Start: 2024-07-02 | End: 2024-07-09

## 2024-07-02 RX ORDER — PREDNISONE 20 MG/1
TABLET ORAL
Qty: 10 TABLET | Refills: 0 | OUTPATIENT
Start: 2024-07-02

## 2024-07-02 RX ORDER — CEPHALEXIN 500 MG/1
CAPSULE ORAL
Qty: 28 CAPSULE | Refills: 0 | OUTPATIENT
Start: 2024-07-02

## 2024-07-02 RX ORDER — METRONIDAZOLE 500 MG/1
500 TABLET ORAL 3 TIMES DAILY
Qty: 15 TABLET | Refills: 0 | OUTPATIENT
Start: 2024-07-02 | End: 2024-07-07

## 2024-07-02 RX ORDER — DOXYCYCLINE HYCLATE 100 MG
100 TABLET ORAL 2 TIMES DAILY
Qty: 14 TABLET | Refills: 0 | OUTPATIENT
Start: 2024-07-02 | End: 2024-07-09

## 2024-07-08 ENCOUNTER — OFFICE VISIT (OUTPATIENT)
Dept: VASCULAR SURGERY | Age: 79
End: 2024-07-08
Payer: MEDICARE

## 2024-07-08 VITALS — HEIGHT: 72 IN | HEART RATE: 88 BPM | WEIGHT: 282 LBS | BODY MASS INDEX: 38.19 KG/M2

## 2024-07-08 DIAGNOSIS — L98.499 ULCER OF EXTREMITY DUE TO CHRONIC VENOUS INSUFFICIENCY (HCC): ICD-10-CM

## 2024-07-08 DIAGNOSIS — I89.0 LYMPHEDEMA OF BOTH LOWER EXTREMITIES: ICD-10-CM

## 2024-07-08 DIAGNOSIS — I87.2 ULCER OF EXTREMITY DUE TO CHRONIC VENOUS INSUFFICIENCY (HCC): ICD-10-CM

## 2024-07-08 DIAGNOSIS — R22.43 LOCALIZED SWELLING OF BOTH LOWER LEGS: Primary | ICD-10-CM

## 2024-07-08 PROCEDURE — 99214 OFFICE O/P EST MOD 30 MIN: CPT | Performed by: SURGERY

## 2024-07-08 PROCEDURE — 1123F ACP DISCUSS/DSCN MKR DOCD: CPT | Performed by: SURGERY

## 2024-07-08 NOTE — PROGRESS NOTES
Vital Sign     Worried About Running Out of Food in the Last Year: Never true     Ran Out of Food in the Last Year: Never true   Transportation Needs: No Transportation Needs (5/3/2024)    PRAPARE - Transportation     Lack of Transportation (Medical): No     Lack of Transportation (Non-Medical): No   Physical Activity: Insufficiently Active (10/10/2023)    Exercise Vital Sign     Days of Exercise per Week: 3 days     Minutes of Exercise per Session: 20 min   Housing Stability: Low Risk  (5/3/2024)    Housing Stability Vital Sign     Unable to Pay for Housing in the Last Year: No     Number of Places Lived in the Last Year: 1     Unstable Housing in the Last Year: No       Family History  Family History   Problem Relation Age of Onset    Cancer Mother         lymphoma    Heart Failure Father     Hypertension Father     Heart Disease Father        ROS  Review of Systems   All other systems reviewed and are negative.       Physical Exam  Physical Exam  Vitals and nursing note reviewed.   Constitutional:       General: He is not in acute distress.     Appearance: Normal appearance.   HENT:      Head: Normocephalic and atraumatic.   Cardiovascular:      Rate and Rhythm: Normal rate and regular rhythm.      Comments: Significant edema extending into the thighs.  Calf edema improved from Unna boots.  Pulmonary:      Effort: Pulmonary effort is normal.   Abdominal:      General: There is no distension.      Palpations: Abdomen is soft.      Tenderness: There is no abdominal tenderness.   Musculoskeletal:         General: Normal range of motion.      Right lower le+ Pitting Edema present.      Left lower le+ Pitting Edema present.   Skin:     General: Skin is warm and dry.      Capillary Refill: Capillary refill takes less than 2 seconds.      Findings: Wound present.      Comments: Bilateral pretibial wounds with surrounding venous stasis dermatitis   Neurological:      General: No focal deficit present.      Mental

## 2024-07-11 DIAGNOSIS — R22.43 LOCALIZED SWELLING OF BOTH LOWER LEGS: ICD-10-CM

## 2024-07-11 RX ORDER — FUROSEMIDE 20 MG/1
20 TABLET ORAL DAILY
Qty: 90 TABLET | Refills: 1 | Status: SHIPPED | OUTPATIENT
Start: 2024-07-11

## 2024-07-11 RX ORDER — POTASSIUM CHLORIDE 750 MG/1
10 TABLET, EXTENDED RELEASE ORAL DAILY
Qty: 90 TABLET | Refills: 0 | Status: SHIPPED | OUTPATIENT
Start: 2024-07-11

## 2024-07-11 NOTE — TELEPHONE ENCOUNTER
Future Appointments   Date Time Provider Department Center   7/15/2024  2:15 PM Stef Stephens APRN - CNP [place] [facility]   8/2/2024  2:30 PM Rafael Conn MD [facility] [place] [facility]   10/2/2024  9:00 AM [facility]   10/11/2024  9:30 AM Thomas Murrieta MD [place] [facility]       Last appt 6/18/24

## 2024-07-15 ENCOUNTER — OFFICE VISIT (OUTPATIENT)
Dept: VASCULAR SURGERY | Age: 79
End: 2024-07-15
Payer: MEDICARE

## 2024-07-15 VITALS — WEIGHT: 282 LBS | BODY MASS INDEX: 38.25 KG/M2

## 2024-07-15 DIAGNOSIS — M79.89 LEG SWELLING: Primary | ICD-10-CM

## 2024-07-15 DIAGNOSIS — R22.43 LOCALIZED SWELLING OF BOTH LOWER LEGS: ICD-10-CM

## 2024-07-15 PROCEDURE — 29580 STRAPPING UNNA BOOT: CPT | Performed by: NURSE PRACTITIONER

## 2024-07-15 ASSESSMENT — ENCOUNTER SYMPTOMS: COLOR CHANGE: 1

## 2024-07-15 NOTE — PATIENT INSTRUCTIONS
Return in about 1 week (around 7/22/2024) for bilateral Unna boots.      PATIENT EDUCATION focused on need for continued Unna boot therapy for compression.  They support vascular problems, help to heal leg ulcers and control leg swelling.  The dressings can be worn up to a week before they need changed.  Patient must keep the Unna boots dry.

## 2024-07-15 NOTE — PROGRESS NOTES
capsule TAKE TWO CAPSULES BY MOUTH DAILY  Alexandra Walker MD   IRON PO Take 65 mg by mouth daily.  Provider, MD Guy       History reviewed.      Physical Exam  Vitals reviewed.   Constitutional:       General: He is not in acute distress.     Appearance: Normal appearance. He is well-developed.   HENT:      Head: Normocephalic.      Right Ear: Hearing normal.      Left Ear: Hearing normal.   Eyes:      General: Lids are normal.      Conjunctiva/sclera: Conjunctivae normal.      Pupils: Pupils are equal, round, and reactive to light.   Neck:      Vascular: No carotid bruit or JVD.      Trachea: Trachea normal. No tracheal deviation.   Cardiovascular:      Rate and Rhythm: Normal rate and regular rhythm.      Pulses:           Dorsalis pedis pulses are detected w/ Doppler on the right side and detected w/ Doppler on the left side.        Posterior tibial pulses are detected w/ Doppler on the right side and detected w/ Doppler on the left side.      Heart sounds: Normal heart sounds. No murmur heard.     No friction rub. No gallop.      Comments:   MEASUREMENTS:  7/15/24  Right ankle:  27.7 cm  Right calf:  43.0 cm  Left ankle:  28.0 cm  Left calf:  39.8 cm    7/1/24  Right ankle:  27.7 cm  Right calf:  48.5 cm  Left ankle:  28.8 cm  Left calf:  42.9 cm    DOPPLERS:  (patient has significant swelling today)  6/28/24  Right DP:  Biphasic  Right PT:  Biphasic  Left DP:  Monophasic  Left PT:  Biphasic    MEASUREMENTS:  6/28/24  Right ankle:  29 cm  Right calf:  51 cm  Left ankle:  31.2 cm  Left calf:  49 cm  Pulmonary:      Effort: Pulmonary effort is normal. No respiratory distress.      Breath sounds: Normal breath sounds. No wheezing.   Musculoskeletal:         General: Normal range of motion.      Cervical back: Full passive range of motion without pain, normal range of motion and neck supple.      Right lower leg: Right lower leg edema: right ankle measures 27 7 cm, calf 43.0 cm.      Left lower leg:

## 2024-07-22 ENCOUNTER — OFFICE VISIT (OUTPATIENT)
Dept: VASCULAR SURGERY | Age: 79
End: 2024-07-22
Payer: MEDICARE

## 2024-07-22 VITALS — DIASTOLIC BLOOD PRESSURE: 90 MMHG | SYSTOLIC BLOOD PRESSURE: 150 MMHG | HEART RATE: 78 BPM | OXYGEN SATURATION: 98 %

## 2024-07-22 DIAGNOSIS — M79.89 LEG SWELLING: Primary | ICD-10-CM

## 2024-07-22 PROCEDURE — 29580 STRAPPING UNNA BOOT: CPT | Performed by: NURSE PRACTITIONER

## 2024-07-22 ASSESSMENT — ENCOUNTER SYMPTOMS: COLOR CHANGE: 1

## 2024-07-22 NOTE — PROGRESS NOTES
lower leg edema: right ankle measures 27 2 cm, calf 40.4 cm.      Left lower leg: Left lower leg edema: left ankle measures 26.7 cm, calf 49.4 cm.   Skin:     General: Skin is warm and dry.      Findings: No erythema or rash.   Neurological:      Mental Status: He is alert and oriented to person, place, and time.   Psychiatric:         Speech: Speech normal.         Behavior: Behavior normal. Behavior is cooperative.         Thought Content: Thought content normal.         ASSESSMENT:  1. Leg swelling      Bilateral Unna boots were applied.      PATIENT EDUCATION focused on need for continued Unna boot therapy for compression.  They support vascular problems, help to heal leg ulcers and control leg swelling.  The dressings can be worn up to a week before they need changed.  Patient must keep the Unna boots dry.        PLAN:    Return in about 1 week (around 7/29/2024) for bilateral LE swelling and to have Unna boots changed.    Plan for Circ-Aids when leg swelling improves.

## 2024-07-22 NOTE — PATIENT INSTRUCTIONS
Return in about 1 week (around 7/29/2024) for bilateral LE swelling and to have Unna boots changed.        PATIENT EDUCATION focused on need for continued Unna boot therapy for compression.  They support vascular problems, help to heal leg ulcers and control leg swelling.  The dressings can be worn up to a week before they need changed.  Patient must keep the Unna boots dry.

## 2024-07-23 ENCOUNTER — TELEPHONE (OUTPATIENT)
Dept: VASCULAR SURGERY | Age: 79
End: 2024-07-23

## 2024-07-23 NOTE — TELEPHONE ENCOUNTER
Patient called regarding question for Dr Murrieta if he wants him to continue water pill and potassium. Please advise him at phone number [phone].

## 2024-07-24 NOTE — TELEPHONE ENCOUNTER
Called patient and gave him the info and he will call his PCP even though he moved to the other side of [facility].

## 2024-07-25 DIAGNOSIS — L53.9 ERYTHEMA OF LOWER EXTREMITY: ICD-10-CM

## 2024-07-25 NOTE — TELEPHONE ENCOUNTER
Future Appointments   Date Time Provider Department Center   7/29/2024  1:15 PM Stef Stephens APRN - CNP [place] [facility]   8/2/2024  2:30 PM Rafael Conn MD [facility] [place] [facility]   10/2/2024  9:00 [facility]   10/11/2024  9:30 AM Thomas Murrieta MD [facility]     Last appt 06/18/24

## 2024-07-29 ENCOUNTER — OFFICE VISIT (OUTPATIENT)
Dept: VASCULAR SURGERY | Age: 79
End: 2024-07-29
Payer: MEDICARE

## 2024-07-29 VITALS — OXYGEN SATURATION: 99 % | SYSTOLIC BLOOD PRESSURE: 137 MMHG | DIASTOLIC BLOOD PRESSURE: 74 MMHG | HEART RATE: 78 BPM

## 2024-07-29 DIAGNOSIS — M79.89 LEG SWELLING: Primary | ICD-10-CM

## 2024-07-29 PROCEDURE — 29580 STRAPPING UNNA BOOT: CPT | Performed by: NURSE PRACTITIONER

## 2024-07-29 ASSESSMENT — ENCOUNTER SYMPTOMS: COLOR CHANGE: 1

## 2024-07-30 NOTE — PROGRESS NOTES
Shadi Carreon (:  1945) is a 79 y.o. male,New patient, here for evaluation of the following chief complaint(s):  Leg Swelling (One week FU on bilateral LE swelling and to have Unna boots changed)      Edema  The edema is located in the bilateral lower extremity edema.  The patient reports some improvement in the swelling in his bilateral LE's.  The patient has not experienced any new symptoms since last visit.  The patient states the edema is recurrent.  The patient had a venous doppler that was negative for DVT on 24.  Current treatment includes: Bilateral Unna boots and Lasix (per his PCP).      Review of Systems   Constitutional:  Negative for chills and fever.   Cardiovascular:  Positive for leg swelling (bilateral).   Skin:  Positive for color change. Negative for wound.   All other systems reviewed and are negative.      No Known Allergies    Prior to Visit Medications    Medication Sig Taking? Authorizing Provider   mupirocin (BACTROBAN) 2 % ointment APPLY TO AFFECTED AREA 3 TIMES A DAY  Michel Gunter MD   furosemide (LASIX) 20 MG tablet TAKE 1 TABLET BY MOUTH EVERY DAY  Michel Gunter MD   potassium chloride (KLOR-CON M10) 10 MEQ extended release tablet TAKE 1 TABLET BY MOUTH EVERY DAY  Michel Gunter MD   aspirin 81 MG EC tablet Take 1 tablet by mouth daily  ProviderGuy MD   fluticasone (FLONASE) 50 MCG/ACT nasal spray SPRAY 1 SPRAY INTO EACH NOSTRIL EVERY DAY  Patient not taking: Reported on 2024  Michel Gunter MD   tamsulosin (FLOMAX) 0.4 MG capsule TAKE 2 CAPSULES BY MOUTH EVERY DAY  Michel Gunter MD   triamterene-hydroCHLOROthiazide (MAXZIDE-25) 37.5-25 MG per tablet TAKE 1/2 TABLET BY MOUTH DAILY  Rafael Conn MD   Handicap Placard MISC by Does not apply route  Pankaj Loredo MD   Omega-3 Fatty Acids (FISH OIL) 1000 MG CAPS Take 3 capsules by mouth daily  ProviderGuy MD   vitamin D (CHOLECALCIFEROL) 1000

## 2024-07-30 NOTE — PATIENT INSTRUCTIONS
Return in about 1 week (around 8/5/2024) for bilateral LE edema and to have Unna boots changed.      PATIENT EDUCATION focused on need for continued Unna boot therapy for compression.  They support vascular problems, help to heal leg ulcers and control leg swelling.  The dressings can be worn up to a week before they need changed.  Patient must keep the Unna boots dry.

## 2024-08-02 ENCOUNTER — OFFICE VISIT (OUTPATIENT)
Dept: INTERNAL MEDICINE CLINIC | Age: 79
End: 2024-08-02
Payer: MEDICARE

## 2024-08-02 VITALS
WEIGHT: 279 LBS | SYSTOLIC BLOOD PRESSURE: 124 MMHG | BODY MASS INDEX: 37.84 KG/M2 | DIASTOLIC BLOOD PRESSURE: 70 MMHG | OXYGEN SATURATION: 94 % | HEART RATE: 80 BPM

## 2024-08-02 DIAGNOSIS — D47.3 ESSENTIAL THROMBOCYTHEMIA (HCC): Primary | ICD-10-CM

## 2024-08-02 DIAGNOSIS — I10 ESSENTIAL HYPERTENSION, BENIGN: ICD-10-CM

## 2024-08-02 PROCEDURE — 99213 OFFICE O/P EST LOW 20 MIN: CPT | Performed by: STUDENT IN AN ORGANIZED HEALTH CARE EDUCATION/TRAINING PROGRAM

## 2024-08-02 PROCEDURE — 3078F DIAST BP <80 MM HG: CPT | Performed by: STUDENT IN AN ORGANIZED HEALTH CARE EDUCATION/TRAINING PROGRAM

## 2024-08-02 PROCEDURE — 3074F SYST BP LT 130 MM HG: CPT | Performed by: STUDENT IN AN ORGANIZED HEALTH CARE EDUCATION/TRAINING PROGRAM

## 2024-08-02 PROCEDURE — 1123F ACP DISCUSS/DSCN MKR DOCD: CPT | Performed by: STUDENT IN AN ORGANIZED HEALTH CARE EDUCATION/TRAINING PROGRAM

## 2024-08-02 NOTE — PROGRESS NOTES
[facility] Internal Medicine  Clinic Progress note:    Shadi Carreon is a 79 y.o. male with the past medical history as listed below presenting to the clinic for follow up on chronic condition and discussion of preventative health care.    Chief Complaint   Patient presents with    Established New Doctor             Past Medical History:   Diagnosis Date    Amnesia 01/78    Arthritis     Erythema     Antral    Essential thrombocythemia (HCC)     HBP (high blood pressure)     Nodule     Stomach       Past Surgical History:   Procedure Laterality Date    BACK SURGERY      BONE MARROW BIOPSY      CARPAL TUNNEL RELEASE      COLONOSCOPY  5/09    Polypectomy    COLONOSCOPY  1/15    normal -     JOINT REPLACEMENT Left     JOINT REPLACEMENT Right 08/20/2019    RIGHT LATERAL TOTAL HIP REPLACEMENT (Right Hip)    KNEE SURGERY  1969    Left knee    KNEE SURGERY Left     TOTAL HIP ARTHROPLASTY Right 8/20/2019    RIGHT LATERAL TOTAL HIP REPLACEMENT performed by Oscar Navarro MD at [facility] OR       Social History     Socioeconomic History    Marital status:      Spouse name: Not on file    Number of children: Not on file    Years of education: Not on file    Highest education level: Not on file   Occupational History    Not on file   Tobacco Use    Smoking status: Never     Passive exposure: Never    Smokeless tobacco: Never   Vaping Use    Vaping Use: Never used   Substance and Sexual Activity    Alcohol use: Yes     Comment: seldom    Drug use: No    Sexual activity: Not Currently   Other Topics Concern    Not on file   Social History Narrative    Not on file     Social Determinants of Health     Financial Resource Strain: Low Risk  (5/1/2024)    Overall Financial Resource Strain (CARDIA)     Difficulty of Paying Living Expenses: Not hard at all   Food Insecurity: No Food Insecurity (5/3/2024)    Hunger Vital Sign     Worried About Running Out of Food in the Last Year: Never true     Ran Out of Food in the

## 2024-08-05 ENCOUNTER — OFFICE VISIT (OUTPATIENT)
Dept: VASCULAR SURGERY | Age: 79
End: 2024-08-05
Payer: MEDICARE

## 2024-08-05 VITALS — HEART RATE: 85 BPM | DIASTOLIC BLOOD PRESSURE: 82 MMHG | OXYGEN SATURATION: 98 % | SYSTOLIC BLOOD PRESSURE: 143 MMHG

## 2024-08-05 DIAGNOSIS — M79.89 LEG SWELLING: ICD-10-CM

## 2024-08-05 DIAGNOSIS — I87.2 LYMPHEDEMA DUE TO VENOUS INSUFFICIENCY: Primary | ICD-10-CM

## 2024-08-05 DIAGNOSIS — I89.0 LYMPHEDEMA DUE TO VENOUS INSUFFICIENCY: Primary | ICD-10-CM

## 2024-08-05 PROCEDURE — 29580 STRAPPING UNNA BOOT: CPT | Performed by: NURSE PRACTITIONER

## 2024-08-05 ASSESSMENT — ENCOUNTER SYMPTOMS: COLOR CHANGE: 1

## 2024-08-06 NOTE — PATIENT INSTRUCTIONS
Return in about 1 week (around 8/12/2024) for bilateral lower extremities.      PATIENT EDUCATION focused on need for continued Unna boot therapy for compression.  They support vascular problems, help to heal leg ulcers and control leg swelling.  The dressings can be worn up to a week before they need changed.  Patient must keep the Unna boots dry.

## 2024-08-12 ENCOUNTER — OFFICE VISIT (OUTPATIENT)
Dept: VASCULAR SURGERY | Age: 79
End: 2024-08-12
Payer: MEDICARE

## 2024-08-12 VITALS
BODY MASS INDEX: 37.84 KG/M2 | DIASTOLIC BLOOD PRESSURE: 82 MMHG | HEART RATE: 77 BPM | OXYGEN SATURATION: 97 % | HEIGHT: 72 IN | SYSTOLIC BLOOD PRESSURE: 128 MMHG

## 2024-08-12 DIAGNOSIS — M79.89 LEG SWELLING: Primary | ICD-10-CM

## 2024-08-12 PROCEDURE — 29580 STRAPPING UNNA BOOT: CPT | Performed by: NURSE PRACTITIONER

## 2024-08-12 ASSESSMENT — ENCOUNTER SYMPTOMS: COLOR CHANGE: 1

## 2024-08-12 NOTE — PROGRESS NOTES
Shadi Carreon (:  1945) is a 79 y.o. male,New patient, here for evaluation of the following chief complaint(s):  Leg Swelling (One week FU on bilateral LE edema and to have Unna boots applied.)      Edema  The edema is located in the bilateral lower extremity edema.  The patient reports some improvement in the swelling in his bilateral LE's.  The patient has not experienced any new symptoms since last visit.  The patient states the edema is recurrent.  The patient had a venous doppler that was negative for DVT on 24.  Current treatment includes: Bilateral Unna boots and Lasix 20 mg daily (per his PCP).      Review of Systems   Constitutional:  Negative for chills and fever.   Cardiovascular:  Leg swelling: bilateral, controlled with Unna boots.   Skin:  Positive for color change. Negative for wound.   All other systems reviewed and are negative.      No Known Allergies    Prior to Visit Medications    Medication Sig Taking? Authorizing Provider   mupirocin (BACTROBAN) 2 % ointment APPLY TO AFFECTED AREA 3 TIMES A DAY  Michel Gunter MD   furosemide (LASIX) 20 MG tablet TAKE 1 TABLET BY MOUTH EVERY DAY  Michel Gunter MD   potassium chloride (KLOR-CON M10) 10 MEQ extended release tablet TAKE 1 TABLET BY MOUTH EVERY DAY  Michel Gunter MD   aspirin 81 MG EC tablet Take 1 tablet by mouth daily  ProviderGuy MD   fluticasone (FLONASE) 50 MCG/ACT nasal spray SPRAY 1 SPRAY INTO EACH NOSTRIL EVERY DAY  Patient not taking: Reported on 2024  Michel Gunter MD   tamsulosin (FLOMAX) 0.4 MG capsule TAKE 2 CAPSULES BY MOUTH EVERY DAY  Michel Gutner MD   triamterene-hydroCHLOROthiazide (MAXZIDE-25) 37.5-25 MG per tablet TAKE 1/2 TABLET BY MOUTH DAILY  Rafael Conn MD   Handicap Placard MISC by Does not apply route  Pankaj Loredo MD   Omega-3 Fatty Acids (FISH OIL) 1000 MG CAPS Take 3 capsules by mouth daily  ProviderGuy MD   vitamin D

## 2024-08-12 NOTE — PATIENT INSTRUCTIONS
Return in about 1 week (around 8/19/2024) for bilateral LE edema.      PATIENT EDUCATION focused on need for continued Unna boot therapy for compression.  They support vascular problems, help to heal leg ulcers and control leg swelling.  The dressings can be worn up to a week before they need changed.  Patient must keep the Unna boots dry.      Patient asked to call when Lymphapress gets delivered.

## 2024-08-14 ENCOUNTER — TELEPHONE (OUTPATIENT)
Dept: VASCULAR SURGERY | Age: 79
End: 2024-08-14

## 2024-08-14 NOTE — TELEPHONE ENCOUNTER
Patient called today asking to speak to Tia. I let him know she was out of the office today and would be back tomorrow. He would like a call back by Tia. Telephone number is [phone]. His air pump came yesterday and he is pleased with it.

## 2024-08-15 NOTE — TELEPHONE ENCOUNTER
Called and spoke to patient.  He wanted to show he appreciation for the group about how wonderful this experience has been and how fast he got his lymphapress pumps

## 2024-08-21 ENCOUNTER — OFFICE VISIT (OUTPATIENT)
Dept: VASCULAR SURGERY | Age: 79
End: 2024-08-21
Payer: MEDICARE

## 2024-08-21 VITALS — DIASTOLIC BLOOD PRESSURE: 82 MMHG | SYSTOLIC BLOOD PRESSURE: 143 MMHG

## 2024-08-21 DIAGNOSIS — M79.89 LEG SWELLING: ICD-10-CM

## 2024-08-21 DIAGNOSIS — I87.2 LYMPHEDEMA DUE TO VENOUS INSUFFICIENCY: Primary | ICD-10-CM

## 2024-08-21 DIAGNOSIS — I89.0 LYMPHEDEMA DUE TO VENOUS INSUFFICIENCY: Primary | ICD-10-CM

## 2024-08-21 PROCEDURE — 29580 STRAPPING UNNA BOOT: CPT | Performed by: NURSE PRACTITIONER

## 2024-08-21 ASSESSMENT — ENCOUNTER SYMPTOMS: COLOR CHANGE: 1

## 2024-08-21 NOTE — PROGRESS NOTES
Yes ProviderGuy MD   hydroxyurea (HYDREA) 500 MG chemo capsule TAKE TWO CAPSULES BY MOUTH DAILY Yes Alexandra Walker MD   IRON PO Take 65 mg by mouth daily. Yes Guy Hanson MD       History reviewed.      Physical Exam  Vitals reviewed.   Constitutional:       General: He is not in acute distress.     Appearance: Normal appearance. He is well-developed.   HENT:      Head: Normocephalic.      Right Ear: Hearing normal.      Left Ear: Hearing normal.   Eyes:      General: Lids are normal.      Conjunctiva/sclera: Conjunctivae normal.      Pupils: Pupils are equal, round, and reactive to light.   Neck:      Vascular: No carotid bruit or JVD.      Trachea: Trachea normal. No tracheal deviation.   Cardiovascular:      Rate and Rhythm: Normal rate and regular rhythm.      Pulses:           Dorsalis pedis pulses are detected w/ Doppler on the right side and detected w/ Doppler on the left side.        Posterior tibial pulses are detected w/ Doppler on the right side and detected w/ Doppler on the left side.      Heart sounds: Normal heart sounds. No murmur heard.     No friction rub. No gallop.      Comments:   MEASUREMENTS:  8/21/24  Right ankle:  27.4 cm  Right calf:  43.7 cm  Left ankle:  28.0 cm  Left calf:  41.6 cm    8/12/24  Right ankle:  27.2 cm  Right calf:  43.7 cm  Left ankle:  27.5 cm  Left calf:  42.4 cm    8/5/24  Right ankle:  26.7 cm  Right calf:  43.1 cm  Left ankle:  27.5 cm  Left calf:  45.7 cm    7/31/24  Right ankle:  26.8 cm  Right calf:  45.7 cm  Left ankle:  27.8 cm  Left calf:  41.9 cm    7/22//24  Right ankle:  26.7 cm  Right calf:  49.4 cm  Left ankle:  27.2 cm  Left calf:  40.4 cm    7/15/24  Right ankle:  27.7 cm  Right calf:  43.0 cm  Left ankle:  28.0 cm  Left calf:  39.8 cm    7/1/24  Right ankle:  27.7 cm  Right calf:  48.5 cm  Left ankle:  28.8 cm  Left calf:  42.9 cm    DOPPLERS:  (patient has significant swelling today)  6/28/24  Right DP:  Biphasic  Right PT:

## 2024-08-21 NOTE — PATIENT INSTRUCTIONS
Return in about 1 week (around 8/28/2024) for leg swelling .      PATIENT EDUCATION focused on need for continued Unna boot therapy for compression.  They support vascular problems, help to heal leg ulcers and control leg swelling.  The dressings can be worn up to a week before they need changed.  Patient must keep the Unna boots dry.

## 2024-08-28 ENCOUNTER — OFFICE VISIT (OUTPATIENT)
Dept: VASCULAR SURGERY | Age: 79
End: 2024-08-28
Payer: MEDICARE

## 2024-08-28 VITALS — DIASTOLIC BLOOD PRESSURE: 73 MMHG | SYSTOLIC BLOOD PRESSURE: 136 MMHG

## 2024-08-28 DIAGNOSIS — I89.0 LYMPHEDEMA DUE TO VENOUS INSUFFICIENCY: Primary | ICD-10-CM

## 2024-08-28 DIAGNOSIS — M79.89 LEG SWELLING: ICD-10-CM

## 2024-08-28 DIAGNOSIS — I87.2 LYMPHEDEMA DUE TO VENOUS INSUFFICIENCY: Primary | ICD-10-CM

## 2024-08-28 DIAGNOSIS — I87.2 STASIS DERMATITIS OF BOTH LEGS: ICD-10-CM

## 2024-08-28 PROCEDURE — 29580 STRAPPING UNNA BOOT: CPT | Performed by: NURSE PRACTITIONER

## 2024-08-28 ASSESSMENT — ENCOUNTER SYMPTOMS: COLOR CHANGE: 1

## 2024-08-28 NOTE — PROGRESS NOTES
MG CAPS Take 3 capsules by mouth daily Yes Guy Hanson MD   vitamin D (CHOLECALCIFEROL) 1000 UNIT TABS tablet Take 1 tablet by mouth Yes Guy Hanson MD   hydroxyurea (HYDREA) 500 MG chemo capsule TAKE TWO CAPSULES BY MOUTH DAILY Yes Alexandra Walker MD   IRON PO Take 65 mg by mouth daily. Yes Guy Hanson MD       History reviewed.      Physical Exam  Vitals reviewed.   Constitutional:       General: He is not in acute distress.     Appearance: Normal appearance. He is well-developed.   HENT:      Head: Normocephalic.      Right Ear: Hearing normal.      Left Ear: Hearing normal.   Eyes:      General: Lids are normal.      Conjunctiva/sclera: Conjunctivae normal.      Pupils: Pupils are equal, round, and reactive to light.   Neck:      Vascular: No carotid bruit or JVD.      Trachea: Trachea normal. No tracheal deviation.   Cardiovascular:      Rate and Rhythm: Normal rate and regular rhythm.      Pulses:           Dorsalis pedis pulses are detected w/ Doppler on the right side and detected w/ Doppler on the left side.        Posterior tibial pulses are detected w/ Doppler on the right side and detected w/ Doppler on the left side.      Heart sounds: Normal heart sounds. No murmur heard.     No friction rub. No gallop.      Comments:   MEASUREMENTS:  8/28/24  Right ankle:  27.7 cm  Right calf:  48.7 cm  Left ankle:  29.1 cm  Left calf:  45.0 cm    8/21/24  Right ankle:  27.4 cm  Right calf:  43.7 cm  Left ankle:  28.0 cm  Left calf:  41.6 cm    8/12/24  Right ankle:  27.2 cm  Right calf:  43.7 cm  Left ankle:  27.5 cm  Left calf:  42.4 cm    8/5/24  Right ankle:  26.7 cm  Right calf:  43.1 cm  Left ankle:  27.5 cm  Left calf:  45.7 cm    7/31/24  Right ankle:  26.8 cm  Right calf:  45.7 cm  Left ankle:  27.8 cm  Left calf:  41.9 cm    7/22//24  Right ankle:  26.7 cm  Right calf:  49.4 cm  Left ankle:  27.2 cm  Left calf:  40.4 cm    7/15/24  Right ankle:  27.7 cm  Right calf:  43.0  cm  Left ankle:  28.0 cm  Left calf:  39.8 cm    7/1/24  Right ankle:  27.7 cm  Right calf:  48.5 cm  Left ankle:  28.8 cm  Left calf:  42.9 cm    DOPPLERS:  (patient has significant swelling today)  6/28/24  Right DP:  Biphasic  Right PT:  Biphasic  Left DP:  Monophasic  Left PT:  Biphasic    MEASUREMENTS:  6/28/24  Right ankle:  29 cm  Right calf:  51 cm  Left ankle:  31.2 cm  Left calf:  49 cm  Pulmonary:      Effort: Pulmonary effort is normal. No respiratory distress.      Breath sounds: Normal breath sounds. No wheezing.   Musculoskeletal:         General: Normal range of motion.      Cervical back: Full passive range of motion without pain, normal range of motion and neck supple.      Right lower leg: Right lower leg edema: right ankle measures 27.7 cm, calf 48.7 cm.      Left lower leg: Left lower leg edema: left ankle measures 29.1 cm, calf 45.0 cm.   Skin:     General: Skin is warm and dry.      Findings: No erythema or rash.   Neurological:      Mental Status: He is alert and oriented to person, place, and time.   Psychiatric:         Speech: Speech normal.         Behavior: Behavior normal. Behavior is cooperative.         Thought Content: Thought content normal.        Diagnosis Orders   1. Lymphedema due to venous insufficiency     2. Leg swelling     3. Stasis dermatitis of both legs          Bilateral Unna boots were applied. - Aquaphor to stasis dermatitis    PATIENT EDUCATION focused on need for continued Unna boot therapy for compression.  They support vascular problems, help to heal leg ulcers and control leg swelling.  The dressings can be worn up to a week before they need changed.  Patient must keep the Unna boots dry.      He received his Lymphapress last week and is still only able to tolerate 30 minutes per session.  He plans to work up to 60 minutes.    PLAN:    Return in about 1 week (around 9/4/2024) for leg swelling and unna boot change.

## 2024-09-04 ENCOUNTER — OFFICE VISIT (OUTPATIENT)
Dept: VASCULAR SURGERY | Age: 79
End: 2024-09-04

## 2024-09-04 VITALS — HEIGHT: 72 IN | BODY MASS INDEX: 37.84 KG/M2 | SYSTOLIC BLOOD PRESSURE: 143 MMHG | DIASTOLIC BLOOD PRESSURE: 82 MMHG

## 2024-09-04 DIAGNOSIS — I87.2 STASIS DERMATITIS OF BOTH LEGS: ICD-10-CM

## 2024-09-04 DIAGNOSIS — I87.2 LYMPHEDEMA DUE TO VENOUS INSUFFICIENCY: Primary | ICD-10-CM

## 2024-09-04 DIAGNOSIS — I89.0 LYMPHEDEMA DUE TO VENOUS INSUFFICIENCY: Primary | ICD-10-CM

## 2024-09-04 DIAGNOSIS — M79.89 LEG SWELLING: ICD-10-CM

## 2024-09-04 ASSESSMENT — ENCOUNTER SYMPTOMS: COLOR CHANGE: 1

## 2024-09-04 NOTE — PATIENT INSTRUCTIONS
Return in about 1 week (around 9/11/2024) for leg swelling.      PATIENT EDUCATION focused on need for continued Unna boot therapy for compression.  They support vascular problems, help to heal leg ulcers and control leg swelling.  The dressings can be worn up to a week before they need changed.  Patient must keep the Unna boots dry.

## 2024-09-05 NOTE — PROGRESS NOTES
Shadi Carreon (:  1945) is a 79 y.o. male,New patient, here for evaluation of the following chief complaint(s):  Leg Swelling (One week FU on bilateral LE edema and to have Unna boots changed)      Pain Assessment  The patient is currently not experiencing any pain at this time.    Lymphedema  The lymphedema is located in the bilateral lower extremities.  He has a few patches of stasis dermatitis on both shins.  The patient states the edema is recurrent.  The patient had a venous doppler that was negative for DVT on 24.  Current treatment includes: Bilateral Unna boots and Lasix 20 mg daily (per his PCP).      Review of Systems   Constitutional:  Negative for chills and fever.   Cardiovascular:  Leg swelling: bilateral, controlled with Unna boots.   Skin:  Positive for color change and rash (recurrent stasis dermatitis on both shins). Negative for wound.   All other systems reviewed and are negative.      No Known Allergies    Prior to Visit Medications    Medication Sig Taking? Authorizing Provider   mupirocin (BACTROBAN) 2 % ointment APPLY TO AFFECTED AREA 3 TIMES A DAY Yes Michel Gunter MD   furosemide (LASIX) 20 MG tablet TAKE 1 TABLET BY MOUTH EVERY DAY Yes Michel Gunter MD   potassium chloride (KLOR-CON M10) 10 MEQ extended release tablet TAKE 1 TABLET BY MOUTH EVERY DAY Yes Michel Gunter MD   aspirin 81 MG EC tablet Take 1 tablet by mouth daily Yes ProviderGuy MD   fluticasone (FLONASE) 50 MCG/ACT nasal spray SPRAY 1 SPRAY INTO EACH NOSTRIL EVERY DAY Yes Michel Gunter MD   tamsulosin (FLOMAX) 0.4 MG capsule TAKE 2 CAPSULES BY MOUTH EVERY DAY Yes Michel Gunter MD   triamterene-hydroCHLOROthiazide (MAXZIDE-25) 37.5-25 MG per tablet TAKE 1/2 TABLET BY MOUTH DAILY Yes Rafael Conn MD   Handicap Placard MISC by Does not apply route Yes Pankaj Loredo MD   Omega-3 Fatty Acids (FISH OIL) 1000 MG CAPS Take 3 capsules by mouth daily

## 2024-09-10 RX ORDER — TRIAMTERENE/HYDROCHLOROTHIAZID 37.5-25 MG
TABLET ORAL
Qty: 45 TABLET | Refills: 1 | Status: SHIPPED | OUTPATIENT
Start: 2024-09-10

## 2024-09-11 ENCOUNTER — OFFICE VISIT (OUTPATIENT)
Dept: VASCULAR SURGERY | Age: 79
End: 2024-09-11
Payer: MEDICARE

## 2024-09-11 VITALS — DIASTOLIC BLOOD PRESSURE: 75 MMHG | SYSTOLIC BLOOD PRESSURE: 145 MMHG

## 2024-09-11 DIAGNOSIS — I87.2 STASIS DERMATITIS OF BOTH LEGS: ICD-10-CM

## 2024-09-11 DIAGNOSIS — M79.89 LEG SWELLING: ICD-10-CM

## 2024-09-11 DIAGNOSIS — I87.2 LYMPHEDEMA DUE TO VENOUS INSUFFICIENCY: Primary | ICD-10-CM

## 2024-09-11 DIAGNOSIS — I89.0 LYMPHEDEMA DUE TO VENOUS INSUFFICIENCY: Primary | ICD-10-CM

## 2024-09-11 PROCEDURE — 29580 STRAPPING UNNA BOOT: CPT | Performed by: NURSE PRACTITIONER

## 2024-09-11 ASSESSMENT — ENCOUNTER SYMPTOMS: COLOR CHANGE: 1

## 2024-09-18 ENCOUNTER — OFFICE VISIT (OUTPATIENT)
Dept: VASCULAR SURGERY | Age: 79
End: 2024-09-18
Payer: MEDICARE

## 2024-09-18 VITALS — SYSTOLIC BLOOD PRESSURE: 144 MMHG | DIASTOLIC BLOOD PRESSURE: 77 MMHG

## 2024-09-18 DIAGNOSIS — I87.2 STASIS DERMATITIS OF BOTH LEGS: ICD-10-CM

## 2024-09-18 DIAGNOSIS — I89.0 LYMPHEDEMA DUE TO VENOUS INSUFFICIENCY: Primary | ICD-10-CM

## 2024-09-18 DIAGNOSIS — I87.2 LYMPHEDEMA DUE TO VENOUS INSUFFICIENCY: Primary | ICD-10-CM

## 2024-09-18 PROCEDURE — 29580 STRAPPING UNNA BOOT: CPT | Performed by: NURSE PRACTITIONER

## 2024-09-18 ASSESSMENT — ENCOUNTER SYMPTOMS: COLOR CHANGE: 1

## 2024-09-25 ENCOUNTER — OFFICE VISIT (OUTPATIENT)
Dept: VASCULAR SURGERY | Age: 79
End: 2024-09-25
Payer: MEDICARE

## 2024-09-25 VITALS — DIASTOLIC BLOOD PRESSURE: 75 MMHG | SYSTOLIC BLOOD PRESSURE: 137 MMHG

## 2024-09-25 DIAGNOSIS — I87.2 STASIS DERMATITIS OF BOTH LEGS: ICD-10-CM

## 2024-09-25 DIAGNOSIS — M79.89 LEG SWELLING: ICD-10-CM

## 2024-09-25 DIAGNOSIS — I89.0 LYMPHEDEMA DUE TO VENOUS INSUFFICIENCY: Primary | ICD-10-CM

## 2024-09-25 DIAGNOSIS — I87.2 LYMPHEDEMA DUE TO VENOUS INSUFFICIENCY: Primary | ICD-10-CM

## 2024-09-25 PROCEDURE — 29580 STRAPPING UNNA BOOT: CPT | Performed by: NURSE PRACTITIONER

## 2024-09-25 ASSESSMENT — ENCOUNTER SYMPTOMS: COLOR CHANGE: 1

## 2024-09-26 ENCOUNTER — CARE COORDINATION (OUTPATIENT)
Dept: CARE COORDINATION | Age: 79
End: 2024-09-26

## 2024-10-02 ENCOUNTER — OFFICE VISIT (OUTPATIENT)
Dept: PULMONOLOGY | Age: 79
End: 2024-10-02
Payer: MEDICARE

## 2024-10-02 ENCOUNTER — OFFICE VISIT (OUTPATIENT)
Dept: VASCULAR SURGERY | Age: 79
End: 2024-10-02
Payer: MEDICARE

## 2024-10-02 VITALS — DIASTOLIC BLOOD PRESSURE: 73 MMHG | SYSTOLIC BLOOD PRESSURE: 123 MMHG

## 2024-10-02 VITALS
BODY MASS INDEX: 37.83 KG/M2 | RESPIRATION RATE: 18 BRPM | TEMPERATURE: 97.3 F | DIASTOLIC BLOOD PRESSURE: 70 MMHG | SYSTOLIC BLOOD PRESSURE: 120 MMHG | HEIGHT: 72 IN | OXYGEN SATURATION: 97 % | HEART RATE: 90 BPM

## 2024-10-02 DIAGNOSIS — J98.6 PARALYSIS OF DIAPHRAGM: Primary | ICD-10-CM

## 2024-10-02 DIAGNOSIS — I87.2 STASIS DERMATITIS OF BOTH LEGS: ICD-10-CM

## 2024-10-02 DIAGNOSIS — I87.2 LYMPHEDEMA DUE TO VENOUS INSUFFICIENCY: Primary | ICD-10-CM

## 2024-10-02 DIAGNOSIS — I89.0 LYMPHEDEMA DUE TO VENOUS INSUFFICIENCY: Primary | ICD-10-CM

## 2024-10-02 DIAGNOSIS — M79.89 LEG SWELLING: ICD-10-CM

## 2024-10-02 PROCEDURE — 29580 STRAPPING UNNA BOOT: CPT | Performed by: NURSE PRACTITIONER

## 2024-10-02 PROCEDURE — 99213 OFFICE O/P EST LOW 20 MIN: CPT | Performed by: INTERNAL MEDICINE

## 2024-10-02 PROCEDURE — 3078F DIAST BP <80 MM HG: CPT | Performed by: INTERNAL MEDICINE

## 2024-10-02 PROCEDURE — 1123F ACP DISCUSS/DSCN MKR DOCD: CPT | Performed by: INTERNAL MEDICINE

## 2024-10-02 PROCEDURE — 3074F SYST BP LT 130 MM HG: CPT | Performed by: INTERNAL MEDICINE

## 2024-10-02 ASSESSMENT — ENCOUNTER SYMPTOMS: COLOR CHANGE: 1

## 2024-10-02 NOTE — PROGRESS NOTES
Shadi Carreon (:  1945) is a 79 y.o. male,New patient, here for evaluation of the following chief complaint(s):  Leg Swelling (One week FU on bilateral LE edema)      Pain Assessment  The patient is currently not experiencing any pain at this time.    Lymphedema  The lymphedema is located in the bilateral lower extremities.  He has patches of stasis dermatitis on both anterior lower legs.  The patient states the edema is recurrent.  The patient had a venous doppler that was negative for DVT on 24.  Current treatment includes: Bilateral Unna boots and Lasix 20 mg daily (per his PCP).  His Circ-Aids have been ordered through [employer], but they have not arrived yet.  We will keep him in Unna boots until he gets them.      Review of Systems   Constitutional:  Negative for chills and fever.   Cardiovascular:  Leg swelling: bilateral, controlled with Unna boots.   Skin:  Positive for color change and rash (recurrent stasis dermatitis on both shins). Negative for wound.   All other systems reviewed and are negative.      No Known Allergies    Prior to Visit Medications    Medication Sig Taking? Authorizing Provider   triamterene-hydroCHLOROthiazide (MAXZIDE-25) 37.5-25 MG per tablet TAKE 1/2 TABLET BY MOUTH DAILY Yes Rafael Conn MD   mupirocin (BACTROBAN) 2 % ointment APPLY TO AFFECTED AREA 3 TIMES A DAY Yes Michel Gunter MD   furosemide (LASIX) 20 MG tablet TAKE 1 TABLET BY MOUTH EVERY DAY Yes Michel Gunter MD   potassium chloride (KLOR-CON M10) 10 MEQ extended release tablet TAKE 1 TABLET BY MOUTH EVERY DAY Yes Michel Gunter MD   aspirin 81 MG EC tablet Take 1 tablet by mouth daily Yes ProviderGuy MD   fluticasone (FLONASE) 50 MCG/ACT nasal spray SPRAY 1 SPRAY INTO EACH NOSTRIL EVERY DAY Yes Michel Gunter MD   tamsulosin (FLOMAX) 0.4 MG capsule TAKE 2 CAPSULES BY MOUTH EVERY DAY Yes Michel Gunter MD   Handicap Placard MIS by Does not apply

## 2024-10-02 NOTE — PROGRESS NOTES
Chief complaint  This is a 79 y.o. year old male  who comes to see me with a chief complaint of   Chief Complaint   Patient presents with    Follow-up       HPI  Here with a cc of SOB    He is doing fine.  HE did follow up CXR since his illness earlier this year and imaging shows elevated right diaphragm.  He has no breathing issues today nor chronically.  He is dealing with swelling in the legs mostly             Current Outpatient Medications:     triamterene-hydroCHLOROthiazide (MAXZIDE-25) 37.5-25 MG per tablet, TAKE 1/2 TABLET BY MOUTH DAILY, Disp: 45 tablet, Rfl: 1    mupirocin (BACTROBAN) 2 % ointment, APPLY TO AFFECTED AREA 3 TIMES A DAY, Disp: 150 g, Rfl: 1    furosemide (LASIX) 20 MG tablet, TAKE 1 TABLET BY MOUTH EVERY DAY, Disp: 90 tablet, Rfl: 1    potassium chloride (KLOR-CON M10) 10 MEQ extended release tablet, TAKE 1 TABLET BY MOUTH EVERY DAY, Disp: 90 tablet, Rfl: 0    aspirin 81 MG EC tablet, Take 1 tablet by mouth daily, Disp: , Rfl:     tamsulosin (FLOMAX) 0.4 MG capsule, TAKE 2 CAPSULES BY MOUTH EVERY DAY, Disp: 180 capsule, Rfl: 1    Omega-3 Fatty Acids (FISH OIL) 1000 MG CAPS, Take 3 capsules by mouth daily, Disp: , Rfl:     vitamin D (CHOLECALCIFEROL) 1000 UNIT TABS tablet, Take 1 tablet by mouth, Disp: , Rfl:     hydroxyurea (HYDREA) 500 MG chemo capsule, TAKE TWO CAPSULES BY MOUTH DAILY, Disp: 60 capsule, Rfl: 2    IRON PO, Take 65 mg by mouth daily., Disp: , Rfl:     fluticasone (FLONASE) 50 MCG/ACT nasal spray, SPRAY 1 SPRAY INTO EACH NOSTRIL EVERY DAY (Patient not taking: Reported on 10/2/2024), Disp: 1 each, Rfl: 2    Handicap Placard MISC, by Does not apply route, Disp: 1 each, Rfl: 0        PHYSICAL EXAM:  Vitals:    10/02/24 0858   BP: 120/70   Pulse: 90   Resp: 18   Temp: 97.3 °F (36.3 °C)   SpO2: 97%       GENERAL:  Well nourished, alert, appears stated age, no distress  HEENT:  No scleral icterus, no conjunctival irritation.  Mallampati IV  NECK:  No thyromegaly, no

## 2024-10-02 NOTE — PATIENT INSTRUCTIONS
Return on October 11 for an appointment with Dr. Murrieta.  If your new stockings come, please call Stef at [phone].       PATIENT EDUCATION focused on need for continued Unna boot therapy for compression.  They support vascular problems, help to heal leg ulcers and control leg swelling.  The dressings can be worn up to a week before they need changed.  Patient must keep the Unna boots dry.

## 2024-10-10 DIAGNOSIS — R22.43 LOCALIZED SWELLING OF BOTH LOWER LEGS: ICD-10-CM

## 2024-10-10 RX ORDER — POTASSIUM CHLORIDE 750 MG/1
10 TABLET, EXTENDED RELEASE ORAL DAILY
Qty: 90 TABLET | Refills: 0 | Status: SHIPPED | OUTPATIENT
Start: 2024-10-10

## 2024-10-10 NOTE — TELEPHONE ENCOUNTER
Medication:   Requested Prescriptions     Pending Prescriptions Disp Refills    KLOR-CON M10 10 MEQ extended release tablet [Pharmacy Med Name: KLOR-CON M10 TABLET] 90 tablet 0     Sig: TAKE 1 TABLET BY MOUTH EVERY DAY       Last Filled:      Patient Phone Number: [phone] (home) [phone] (work)    Last appt: 8/2/2024   Next appt: 10/25/2024  Future Appointments   Date Time Provider Department Center   10/11/2024  9:30 AM Thomas Murrieta MD [facility]   10/25/2024  3:45 PM Rafael Conn MD [facility] DEP   12/6/2024  4:00 PM Rafael Conn MD [facility] DE[facility]   4/2/2025  9:00 AM Robert Gallo DO [facility]

## 2024-10-11 ENCOUNTER — OFFICE VISIT (OUTPATIENT)
Dept: VASCULAR SURGERY | Age: 79
End: 2024-10-11
Payer: MEDICARE

## 2024-10-11 VITALS
HEIGHT: 72 IN | WEIGHT: 279 LBS | SYSTOLIC BLOOD PRESSURE: 124 MMHG | BODY MASS INDEX: 37.79 KG/M2 | DIASTOLIC BLOOD PRESSURE: 74 MMHG

## 2024-10-11 DIAGNOSIS — L98.499 ULCER OF EXTREMITY DUE TO CHRONIC VENOUS INSUFFICIENCY (HCC): ICD-10-CM

## 2024-10-11 DIAGNOSIS — I87.2 ULCER OF EXTREMITY DUE TO CHRONIC VENOUS INSUFFICIENCY (HCC): ICD-10-CM

## 2024-10-11 DIAGNOSIS — I89.0 LYMPHEDEMA OF BOTH LOWER EXTREMITIES: Primary | ICD-10-CM

## 2024-10-11 PROCEDURE — 3074F SYST BP LT 130 MM HG: CPT | Performed by: SURGERY

## 2024-10-11 PROCEDURE — 3078F DIAST BP <80 MM HG: CPT | Performed by: SURGERY

## 2024-10-11 PROCEDURE — 1123F ACP DISCUSS/DSCN MKR DOCD: CPT | Performed by: SURGERY

## 2024-10-11 PROCEDURE — 99214 OFFICE O/P EST MOD 30 MIN: CPT | Performed by: SURGERY

## 2024-10-11 NOTE — PROGRESS NOTES
[facility] Endovascular [facility]  Thomas Murrieta MD, RPVI      Chief Complaint:   Chief Complaint   Patient presents with    Follow-up     3 month follow up, unna boot        follow up of a pre-existing problem    HPI  Shadi Carreon is a 79 y.o. male who is being seen for follow-up bilateral lower extremity lymphedema and chronic venous insufficiency.  Patient has bilateral Unna boots which were removed today.  He has been doing well and his swelling has significantly improved.  Patient has received his pneumatic compression boots for his lymphedema and is using approximately twice a week.  He can only tolerate approximately half an hour at this time.  Patient has done well with Unna boots and is waiting for CircAid devices to be delivered.  Patient overall happy with how things are going on the standpoint of his lymphedema..     PMH  Past Medical History:   Diagnosis Date    Amnesia 01/78    Arthritis     Erythema     Antral    Essential thrombocythemia (HCC)     HBP (high blood pressure)     Nodule     Stomach       PSH    Past Surgical History:   Procedure Laterality Date    BACK SURGERY      BONE MARROW BIOPSY      CARPAL TUNNEL RELEASE      COLONOSCOPY  5/09    Polypectomy    COLONOSCOPY  1/15    normal -     JOINT REPLACEMENT Left     JOINT REPLACEMENT Right 08/20/2019    RIGHT LATERAL TOTAL HIP REPLACEMENT (Right Hip)    KNEE SURGERY  1969    Left knee    KNEE SURGERY Left     TOTAL HIP ARTHROPLASTY Right 8/20/2019    RIGHT LATERAL TOTAL HIP REPLACEMENT performed by Oscar Navarro MD at [facility]       [facility]  Current Outpatient Medications   Medication Sig Dispense Refill    KLOR-CON M10 10 MEQ extended release tablet TAKE 1 TABLET BY MOUTH EVERY DAY 90 tablet 0    triamterene-hydroCHLOROthiazide (MAXZIDE-25) 37.5-25 MG per tablet TAKE 1/2 TABLET BY MOUTH DAILY 45 tablet 1    mupirocin (BACTROBAN) 2 % ointment APPLY TO AFFECTED AREA 3 TIMES A  g 1    furosemide (LASIX) 20 MG

## 2024-10-17 ENCOUNTER — OFFICE VISIT (OUTPATIENT)
Dept: VASCULAR SURGERY | Age: 79
End: 2024-10-17

## 2024-10-17 VITALS
WEIGHT: 279 LBS | SYSTOLIC BLOOD PRESSURE: 122 MMHG | BODY MASS INDEX: 37.79 KG/M2 | HEIGHT: 72 IN | DIASTOLIC BLOOD PRESSURE: 74 MMHG

## 2024-10-17 DIAGNOSIS — M79.89 LEG SWELLING: Primary | ICD-10-CM

## 2024-10-17 ASSESSMENT — ENCOUNTER SYMPTOMS: COLOR CHANGE: 1

## 2024-10-18 NOTE — PROGRESS NOTES
Shadi Carreon (:  1945) is a 79 y.o. male,New patient, here for evaluation of the following chief complaint(s):  Follow-up (Bilateral LE lymphedema)      Pain Assessment  The patient is currently not experiencing any pain at this time.    Lymphedema  The lymphedema is located in the bilateral lower extremities.  He has patches of stasis dermatitis on both anterior lower legs.  The patient states the edema is recurrent.  The patient had a venous doppler that was negative for DVT on 24.  Current treatment includes: Bilateral Unna boots, Lasix 20 mg daily (per his PCP) and use of Medi leg pumps.  His Circ-Aids have been ordered through [employer].  He thought he was bringing them in today, but the nighttime compression garments were in the boxes.  He has not received the daytime Circ-Aids yet, but will bring them in as soon as they arrive.  Ee will keep him in Unna boots until he gets them.      Review of Systems   Constitutional:  Negative for chills and fever.   Cardiovascular:  Leg swelling: bilateral, controlled with Unna boots.   Skin:  Positive for color change and rash (recurrent stasis dermatitis on both shins). Negative for wound.   All other systems reviewed and are negative.      No Known Allergies    Prior to Visit Medications    Medication Sig Taking? Authorizing Provider   KLOR-CON M1Hillary 10 MEQ extended release tablet TAKE 1 TABLET BY MOUTH EVERY DAY  Rafael Conn MD   triamterene-hydroCHLOROthiazide (MAXZIDE-25) 37.5-25 MG per tablet TAKE 1/2 TABLET BY MOUTH DAILY  Rafael Conn MD   mupirocin (BACTROBAN) 2 % ointment APPLY TO AFFECTED AREA 3 TIMES A DAY  Michel Gunter MD   furosemide (LASIX) 20 MG tablet TAKE 1 TABLET BY MOUTH EVERY DAY  Michel Gunter MD   aspirin 81 MG EC tablet Take 1 tablet by mouth daily  Provider, MD Guy   fluticasone (FLONASE) 50 MCG/ACT nasal spray SPRAY 1 SPRAY INTO EACH NOSTRIL EVERY DAY  Michel Gunter MD   tamsulosin

## 2024-10-18 NOTE — PATIENT INSTRUCTIONS
Return in about 1 week (around 10/24/2024) for bilateral lower extremity swelling.      PATIENT EDUCATION focused on need for continued Unna boot therapy for compression.  They support vascular problems, help to heal leg ulcers and control leg swelling.  The dressings can be worn up to a week before they need changed.  Patient must keep the Unna boots dry.

## 2024-10-24 ENCOUNTER — OFFICE VISIT (OUTPATIENT)
Dept: VASCULAR SURGERY | Age: 79
End: 2024-10-24
Payer: MEDICARE

## 2024-10-24 VITALS
HEART RATE: 90 BPM | DIASTOLIC BLOOD PRESSURE: 82 MMHG | BODY MASS INDEX: 37.79 KG/M2 | HEIGHT: 72 IN | SYSTOLIC BLOOD PRESSURE: 143 MMHG | WEIGHT: 279 LBS

## 2024-10-24 DIAGNOSIS — I89.0 LYMPHEDEMA DUE TO VENOUS INSUFFICIENCY: Primary | ICD-10-CM

## 2024-10-24 DIAGNOSIS — I87.2 LYMPHEDEMA DUE TO VENOUS INSUFFICIENCY: Primary | ICD-10-CM

## 2024-10-24 DIAGNOSIS — M79.89 LEG SWELLING: ICD-10-CM

## 2024-10-24 DIAGNOSIS — I87.2 STASIS DERMATITIS OF BOTH LEGS: ICD-10-CM

## 2024-10-24 PROCEDURE — 1123F ACP DISCUSS/DSCN MKR DOCD: CPT | Performed by: NURSE PRACTITIONER

## 2024-10-24 PROCEDURE — 3079F DIAST BP 80-89 MM HG: CPT | Performed by: NURSE PRACTITIONER

## 2024-10-24 PROCEDURE — 99213 OFFICE O/P EST LOW 20 MIN: CPT | Performed by: NURSE PRACTITIONER

## 2024-10-24 PROCEDURE — 3077F SYST BP >= 140 MM HG: CPT | Performed by: NURSE PRACTITIONER

## 2024-10-25 ENCOUNTER — OFFICE VISIT (OUTPATIENT)
Dept: INTERNAL MEDICINE CLINIC | Age: 79
End: 2024-10-25

## 2024-10-25 DIAGNOSIS — Z00.00 MEDICARE ANNUAL WELLNESS VISIT, SUBSEQUENT: Primary | ICD-10-CM

## 2024-10-25 ASSESSMENT — PATIENT HEALTH QUESTIONNAIRE - PHQ9
SUM OF ALL RESPONSES TO PHQ QUESTIONS 1-9: 0
SUM OF ALL RESPONSES TO PHQ9 QUESTIONS 1 & 2: 0
SUM OF ALL RESPONSES TO PHQ QUESTIONS 1-9: 0
SUM OF ALL RESPONSES TO PHQ QUESTIONS 1-9: 0
1. LITTLE INTEREST OR PLEASURE IN DOING THINGS: NOT AT ALL
SUM OF ALL RESPONSES TO PHQ QUESTIONS 1-9: 0
2. FEELING DOWN, DEPRESSED OR HOPELESS: NOT AT ALL

## 2024-10-25 NOTE — PATIENT INSTRUCTIONS
Patient plans to call for FU visit.    PATIENT EDUCATION focused on the need for Circ-Aids on a daily basis.  They are specially designed compression garments that improve blood flow in the legs, prevent blood clotting and inhibit the progression of a variety of venous disorders.  They are designed to be tighter around the ankles with gradually less pressure moving up the lower limbs toward the knees.  Working with the calf muscle, the Circ-Aid is designed to help squeeze the venous blood back up toward the heart, to enhance circulation.

## 2024-10-25 NOTE — PROGRESS NOTES
Shadi Carreon (:  1945) is a 79 y.o. male, here for evaluation of the following chief complaint(s):  Follow-up (Bilateral LE lymphedema)    Pain Assessment  The patient is currently not experiencing any pain at this time.    Lymphedema  The lymphedema is located in the bilateral lower extremities.  He has recurrent patches of stasis dermatitis on both anterior lower legs.  The patient states the edema is recurrent.  The patient had a venous doppler that was negative for DVT on 24.  Current treatment includes: Bilateral Unna boots, Lasix 20 mg daily (per his PCP) and use of Medi leg pumps.  He received his Circ-Aids and brought them in today.    Review of Systems   Constitutional:  Negative for chills and fever.   Cardiovascular:  Leg swelling: bilateral, controlled with Unna boots.   Skin:  Positive for color change and rash (recurrent stasis dermatitis on both shins). Negative for wound.   All other systems reviewed and are negative.      Allergies   Allergen Reactions    Lidocaine        Prior to Visit Medications    Medication Sig Taking? Authorizing Provider   KLOR-CON M1Hillary 10 MEQ extended release tablet TAKE 1 TABLET BY MOUTH EVERY DAY  Rafael Conn MD   triamterene-hydroCHLOROthiazide (MAXZIDE-25) 37.5-25 MG per tablet TAKE 1/2 TABLET BY MOUTH DAILY  Rafael Conn MD   mupirocin (BACTROBAN) 2 % ointment APPLY TO AFFECTED AREA 3 TIMES A DAY  Michel Gunter MD   furosemide (LASIX) 20 MG tablet TAKE 1 TABLET BY MOUTH EVERY DAY  Michel Gunter MD   aspirin 81 MG EC tablet Take 1 tablet by mouth daily  Provider, MD Guy   fluticasone (FLONASE) 50 MCG/ACT nasal spray SPRAY 1 SPRAY INTO EACH NOSTRIL EVERY DAY  Michel Gunter MD   tamsulosin (FLOMAX) 0.4 MG capsule TAKE 2 CAPSULES BY MOUTH EVERY DAY  Michel Gunter MD   Handicap Placard MISC by Does not apply route  Pankaj Loredo MD   Omega-3 Fatty Acids (FISH OIL) 1000 MG CAPS Take 3 capsules by

## 2024-10-25 NOTE — PROGRESS NOTES
Medicare Annual Wellness Visit    Shadi Carreon is here for Medicare AWV    Assessment & Plan   Medicare annual wellness visit, subsequent  Recommendations for Preventive Services Due: see orders and patient instructions/AVS.  Recommended screening schedule for the next 5-10 years is provided to the patient in written form: see Patient Instructions/AVS.     No follow-ups on file.     Subjective       Patient's complete Health Risk Assessment and screening values have been reviewed and are found in Flowsheets. The following problems were reviewed today and where indicated follow up appointments were made and/or referrals ordered.    Positive Risk Factor Screenings with Interventions:                  Abnormal BMI (obese):  There is no height or weight on file to calculate BMI. (!) Abnormal  Interventions:  Patient advised to follow-up in this office for further evaluation and treatment        Dentist Screen:  Have you seen the dentist within the past year?: (!) No    Intervention:  Advised to schedule with their oral surgeon     Vision Screen:  Do you have difficulty driving, watching TV, or doing any of your daily activities because of your eyesight?: No  Have you had an eye exam within the past year?: (!) No  Interventions:   See AVS for additional education material                    Objective   There were no vitals filed for this visit.   There is no height or weight on file to calculate BMI.                    Allergies   Allergen Reactions    Lidocaine      Prior to Visit Medications    Medication Sig Taking? Authorizing Provider   KLOR-CON M1Hillary 10 MEQ extended release tablet TAKE 1 TABLET BY MOUTH EVERY DAY Yes Rafael Conn MD   triamterene-hydroCHLOROthiazide (MAXZIDE-25) 37.5-25 MG per tablet TAKE 1/2 TABLET BY MOUTH DAILY Yes Rafael Conn MD   mupirocin (BACTROBAN) 2 % ointment APPLY TO AFFECTED AREA 3 TIMES A DAY Yes Michel Gunter MD   furosemide (LASIX) 20 MG tablet TAKE 1 TABLET BY

## 2024-10-25 NOTE — PATIENT INSTRUCTIONS

## 2024-11-21 ENCOUNTER — OFFICE VISIT (OUTPATIENT)
Dept: VASCULAR SURGERY | Age: 79
End: 2024-11-21

## 2024-11-21 VITALS
HEART RATE: 86 BPM | SYSTOLIC BLOOD PRESSURE: 143 MMHG | WEIGHT: 279 LBS | HEIGHT: 72 IN | DIASTOLIC BLOOD PRESSURE: 86 MMHG | BODY MASS INDEX: 37.79 KG/M2

## 2024-11-21 DIAGNOSIS — I87.2 LYMPHEDEMA DUE TO VENOUS INSUFFICIENCY: Primary | ICD-10-CM

## 2024-11-21 DIAGNOSIS — I87.2 STASIS DERMATITIS OF BOTH LEGS: ICD-10-CM

## 2024-11-21 DIAGNOSIS — I89.0 LYMPHEDEMA DUE TO VENOUS INSUFFICIENCY: Primary | ICD-10-CM

## 2024-11-21 DIAGNOSIS — M79.89 LEG SWELLING: ICD-10-CM

## 2024-11-21 ASSESSMENT — ENCOUNTER SYMPTOMS: COLOR CHANGE: 1

## 2024-11-21 NOTE — PATIENT INSTRUCTIONS
Return if symptoms worsen or fail to improve.    PATIENT EDUCATION focused on the need for Circ-Aids on a daily basis.  They are specially designed compression garments that improve blood flow in the legs, prevent blood clotting and inhibit the progression of a variety of venous disorders.  They are designed to be tighter around the ankles with gradually less pressure moving up the lower limbs toward the knees.  Working with the calf muscle, the Circ-Aid is designed to help squeeze the venous blood back up toward the heart, to enhance circulation.

## 2024-11-21 NOTE — PROGRESS NOTES
Shadi Carreon (:  1945) is a 79 y.o. male, here for evaluation of the following chief complaint(s):  Follow-up (1 month follow up on lymphedema pumps and Circ-Aids; legs are doing good.)    Pain Assessment  The patient is currently not experiencing any pain at this time.    Lymphedema  The lymphedema is located in the bilateral lower extremities.  He has recurrent patches of stasis dermatitis on both anterior lower legs.  The patient states the edema is recurrent.  The patient had a venous doppler that was negative for DVT on 24.  Current treatment includes: Bilateral Circ-Aids, Lasix 20 mg & HCTZ 12.5 mg daily (per his PCP) and use of Medi leg pumps.  He is doing very well keeping the lymphedema controlled.     Review of Systems   Constitutional:  Negative for chills and fever.   Cardiovascular:  Leg swelling: bilateral, controlled with Circ-Aids.   Skin:  Positive for color change and rash (small amount of stasis dermatitis on both shins). Negative for wound.   All other systems reviewed and are negative.      Allergies   Allergen Reactions    Lidocaine        Prior to Visit Medications    Medication Sig Taking? Authorizing Provider   KLOR-CON M1Hillary 10 MEQ extended release tablet TAKE 1 TABLET BY MOUTH EVERY DAY  Rafael Conn MD   triamterene-hydroCHLOROthiazide (MAXZIDE-25) 37.5-25 MG per tablet TAKE 1/2 TABLET BY MOUTH DAILY  Rafael Conn MD   mupirocin (BACTROBAN) 2 % ointment APPLY TO AFFECTED AREA 3 TIMES A DAY  Michel Gunter MD   furosemide (LASIX) 20 MG tablet TAKE 1 TABLET BY MOUTH EVERY DAY  Michel Gunter MD   aspirin 81 MG EC tablet Take 1 tablet by mouth daily  Provider, MD Guy   fluticasone (FLONASE) 50 MCG/ACT nasal spray SPRAY 1 SPRAY INTO EACH NOSTRIL EVERY DAY  Michel Gunter MD   tamsulosin (FLOMAX) 0.4 MG capsule TAKE 2 CAPSULES BY MOUTH EVERY DAY  Michel Gunter MD   Handicap Placard MISC by Does not apply route

## 2024-12-06 ENCOUNTER — OFFICE VISIT (OUTPATIENT)
Dept: INTERNAL MEDICINE CLINIC | Age: 79
End: 2024-12-06
Payer: MEDICARE

## 2024-12-06 VITALS
SYSTOLIC BLOOD PRESSURE: 138 MMHG | WEIGHT: 280 LBS | OXYGEN SATURATION: 98 % | BODY MASS INDEX: 37.93 KG/M2 | HEIGHT: 72 IN | HEART RATE: 77 BPM | DIASTOLIC BLOOD PRESSURE: 80 MMHG

## 2024-12-06 DIAGNOSIS — I10 ESSENTIAL HYPERTENSION, BENIGN: Primary | ICD-10-CM

## 2024-12-06 LAB
BASOPHILS # BLD: 0.1 K/UL (ref 0–0.2)
BASOPHILS NFR BLD: 1.3 %
DEPRECATED RDW RBC AUTO: 14.5 % (ref 12.4–15.4)
EOSINOPHIL # BLD: 0.2 K/UL (ref 0–0.6)
EOSINOPHIL NFR BLD: 3.7 %
HCT VFR BLD AUTO: 37.5 % (ref 40.5–52.5)
HGB BLD-MCNC: 13.2 G/DL (ref 13.5–17.5)
LYMPHOCYTES # BLD: 1.2 K/UL (ref 1–5.1)
LYMPHOCYTES NFR BLD: 23.3 %
MCH RBC QN AUTO: 36.1 PG (ref 26–34)
MCHC RBC AUTO-ENTMCNC: 35.3 G/DL (ref 31–36)
MCV RBC AUTO: 102.2 FL (ref 80–100)
MONOCYTES # BLD: 0.4 K/UL (ref 0–1.3)
MONOCYTES NFR BLD: 8.8 %
NEUTROPHILS # BLD: 3.1 K/UL (ref 1.7–7.7)
NEUTROPHILS NFR BLD: 62.9 %
PLATELET # BLD AUTO: 278 K/UL (ref 135–450)
PMV BLD AUTO: 7.5 FL (ref 5–10.5)
RBC # BLD AUTO: 3.67 M/UL (ref 4.2–5.9)
WBC # BLD AUTO: 5 K/UL (ref 4–11)

## 2024-12-06 PROCEDURE — 36415 COLL VENOUS BLD VENIPUNCTURE: CPT | Performed by: STUDENT IN AN ORGANIZED HEALTH CARE EDUCATION/TRAINING PROGRAM

## 2024-12-06 PROCEDURE — 99213 OFFICE O/P EST LOW 20 MIN: CPT | Performed by: STUDENT IN AN ORGANIZED HEALTH CARE EDUCATION/TRAINING PROGRAM

## 2024-12-06 PROCEDURE — 1123F ACP DISCUSS/DSCN MKR DOCD: CPT | Performed by: STUDENT IN AN ORGANIZED HEALTH CARE EDUCATION/TRAINING PROGRAM

## 2024-12-06 PROCEDURE — 1159F MED LIST DOCD IN RCRD: CPT | Performed by: STUDENT IN AN ORGANIZED HEALTH CARE EDUCATION/TRAINING PROGRAM

## 2024-12-06 PROCEDURE — 3075F SYST BP GE 130 - 139MM HG: CPT | Performed by: STUDENT IN AN ORGANIZED HEALTH CARE EDUCATION/TRAINING PROGRAM

## 2024-12-06 PROCEDURE — 3079F DIAST BP 80-89 MM HG: CPT | Performed by: STUDENT IN AN ORGANIZED HEALTH CARE EDUCATION/TRAINING PROGRAM

## 2024-12-06 RX ORDER — TIZANIDINE 2 MG/1
2 TABLET ORAL EVERY 8 HOURS PRN
Qty: 90 TABLET | Refills: 1 | Status: SHIPPED | OUTPATIENT
Start: 2024-12-06

## 2024-12-06 RX ORDER — TRIAMTERENE AND HYDROCHLOROTHIAZIDE 37.5; 25 MG/1; MG/1
TABLET ORAL
Qty: 45 TABLET | Refills: 3 | Status: SHIPPED | OUTPATIENT
Start: 2024-12-06

## 2024-12-07 LAB
ALBUMIN SERPL-MCNC: 4.3 G/DL (ref 3.4–5)
ALBUMIN/GLOB SERPL: 2.4 {RATIO} (ref 1.1–2.2)
ALP SERPL-CCNC: 43 U/L (ref 40–129)
ALT SERPL-CCNC: 15 U/L (ref 10–40)
ANION GAP SERPL CALCULATED.3IONS-SCNC: 9 MMOL/L (ref 3–16)
AST SERPL-CCNC: 18 U/L (ref 15–37)
BILIRUB SERPL-MCNC: 0.8 MG/DL (ref 0–1)
BUN SERPL-MCNC: 15 MG/DL (ref 7–20)
CALCIUM SERPL-MCNC: 9.2 MG/DL (ref 8.3–10.6)
CHLORIDE SERPL-SCNC: 104 MMOL/L (ref 99–110)
CHOLEST SERPL-MCNC: 136 MG/DL (ref 0–199)
CO2 SERPL-SCNC: 28 MMOL/L (ref 21–32)
CREAT SERPL-MCNC: 0.8 MG/DL (ref 0.8–1.3)
GFR SERPLBLD CREATININE-BSD FMLA CKD-EPI: >90 ML/MIN/{1.73_M2}
GLUCOSE SERPL-MCNC: 99 MG/DL (ref 70–99)
HDLC SERPL-MCNC: 49 MG/DL (ref 40–60)
LDLC SERPL CALC-MCNC: 78 MG/DL
POTASSIUM SERPL-SCNC: 3.7 MMOL/L (ref 3.5–5.1)
PROT SERPL-MCNC: 6.1 G/DL (ref 6.4–8.2)
SODIUM SERPL-SCNC: 141 MMOL/L (ref 136–145)
TRIGL SERPL-MCNC: 45 MG/DL (ref 0–150)
TSH SERPL DL<=0.005 MIU/L-ACNC: 2.46 UIU/ML (ref 0.27–4.2)
VLDLC SERPL CALC-MCNC: 9 MG/DL

## 2024-12-13 ENCOUNTER — TELEPHONE (OUTPATIENT)
Dept: INTERNAL MEDICINE CLINIC | Age: 79
End: 2024-12-13

## 2024-12-13 NOTE — TELEPHONE ENCOUNTER
Thyroid stimulating hormone is normal indicating normal thyroid function.  Complete blood count was normal without any signs of anemia or blood cell disorders.  Comprehensive metabolic panel shows normal kidney function, normal liver enzymes, and normal electrolytes  Lipid panel is normal    Overall normal labs, keep up the good work.

## 2024-12-26 NOTE — PROGRESS NOTES
Shadi Carreon is a 79 y.o. male with a past medical history noted below who presents for routine follow up on chronic conditions and discussion of preventative health care.  Chief Complaint   Patient presents with    Follow-up    Other     Handicap plaque 2x           Past Medical History:   Diagnosis Date    Amnesia 01/78    Arthritis     Erythema     Antral    Essential thrombocythemia (HCC)     HBP (high blood pressure)     Nodule     Stomach     Patient Active Problem List   Diagnosis    Sleep apnea    Edema    Encounter for long-term (current) use of other medications    Essential thrombocythemia    Essential hypertension, benign    Osteoarthritis of left knee    Osteoarthritis of hip-right    Allergic rhinitis    Allergic dermatitis    Primary osteoarthritis of left hip    Arthritis of left hip    Morbid obesity    History of total left hip replacement    Arthritis of right knee    Tendinitis of finger    Hx of total hip arthroplasty, right    Achilles tendinosis of right lower extremity    MENG-2 gene mutation    Encounter for long-term (current) use of high-risk medication    Benign non-nodular prostatic hyperplasia with lower urinary tract symptoms    Rosacea    Right groin pain    Arthritis of right hip    Pneumonia, bacterial    Leg swelling    Localized swelling of both lower legs    Lymphedema due to venous insufficiency    Stasis dermatitis of both legs       Vitals:    12/06/24 1541   BP: 138/80   Site: Left Upper Arm   Position: Sitting   Cuff Size: Medium Adult   Pulse: 77   SpO2: 98%   Weight: 127 kg (280 lb)   Height: 1.829 m (6' 0.01\")     General: Alert and oriented X3. No acute distress  Eyes: PEERL. No scleral icterus noted. Normal appearing conjunctiva bilaterally  Ears: Normal TM and external canal bilaterally.  Oral cavity/Throat: No pharyngeal erythema. No oral lesions.  Cardiovascular: Heart is regular rate and rhythm. No murmurs noted. Radial pulses 2+. Posterior tibial pulses 2+. No lower

## 2024-12-26 NOTE — ASSESSMENT & PLAN NOTE
Orders:    LIPID PANEL    Comprehensive Metabolic Panel    CBC with Auto Differential    TSH with Reflex

## 2024-12-30 NOTE — TELEPHONE ENCOUNTER
Medication:   Requested Prescriptions     Pending Prescriptions Disp Refills    tiZANidine (ZANAFLEX) 2 MG tablet [Pharmacy Med Name: TIZANIDINE HCL 2 MG TABLET] 270 tablet 1     Sig: TAKE 1 TABLET BY MOUTH EVERY 8 HOURS AS NEEDED (BACK AND HIP PAIN)       Last Filled:      Patient Phone Number: [phone] (home) [phone] (work)    Last appt: 12/6/2024   Next appt: 4/7/2025  Future Appointments   Date Time Provider Department Center   4/2/2025  9:00 AM Robert Gallo DO [facility]   4/7/2025  3:45 PM Rafael Conn MD [facility] ECC DEP

## 2024-12-31 RX ORDER — TIZANIDINE 2 MG/1
2 TABLET ORAL EVERY 8 HOURS PRN
Qty: 270 TABLET | Refills: 1 | Status: SHIPPED | OUTPATIENT
Start: 2024-12-31

## 2025-01-08 DIAGNOSIS — R22.43 LOCALIZED SWELLING OF BOTH LOWER LEGS: ICD-10-CM

## 2025-01-08 RX ORDER — POTASSIUM CHLORIDE 750 MG/1
10 TABLET, EXTENDED RELEASE ORAL DAILY
Qty: 90 TABLET | Refills: 0 | Status: SHIPPED | OUTPATIENT
Start: 2025-01-08

## 2025-01-08 RX ORDER — FUROSEMIDE 20 MG/1
20 TABLET ORAL DAILY
Qty: 90 TABLET | Refills: 1 | Status: SHIPPED | OUTPATIENT
Start: 2025-01-08

## 2025-01-08 NOTE — TELEPHONE ENCOUNTER
Medication:   Requested Prescriptions     Pending Prescriptions Disp Refills    furosemide (LASIX) 20 MG tablet [Pharmacy Med Name: FUROSEMIDE 20 MG TABLET] 90 tablet 1     Sig: TAKE 1 TABLET BY MOUTH EVERY DAY    KLOR-CON M10 10 MEQ extended release tablet [Pharmacy Med Name: KLOR-CON M10 TABLET] 90 tablet 0     Sig: TAKE 1 TABLET BY MOUTH EVERY DAY       Last Filled:      Patient Phone Number: [phone] (home) [phone] (work)    Last appt: 12/6/2024   Next appt: 4/7/2025  Future Appointments   Date Time Provider Department Center   4/2/2025  9:00 [facility]   4/7/2025  3:45 PM Rafael Conn MD [facility] ECC DEP

## 2025-02-06 ENCOUNTER — OFFICE VISIT (OUTPATIENT)
Dept: INTERNAL MEDICINE CLINIC | Age: 80
End: 2025-02-06

## 2025-02-06 VITALS
OXYGEN SATURATION: 98 % | BODY MASS INDEX: 39.93 KG/M2 | SYSTOLIC BLOOD PRESSURE: 142 MMHG | HEIGHT: 72 IN | HEART RATE: 83 BPM | DIASTOLIC BLOOD PRESSURE: 82 MMHG | WEIGHT: 294.8 LBS

## 2025-02-06 DIAGNOSIS — L08.9 SUPERFICIAL SKIN INFECTION: Primary | ICD-10-CM

## 2025-02-06 PROBLEM — L98.499 ULCER OF EXTREMITY DUE TO CHRONIC VENOUS INSUFFICIENCY (HCC): Status: ACTIVE | Noted: 2025-02-06

## 2025-02-06 PROBLEM — J96.01 ACUTE RESPIRATORY FAILURE WITH HYPOXIA: Status: ACTIVE | Noted: 2025-02-06

## 2025-02-06 PROBLEM — I87.2 ULCER OF EXTREMITY DUE TO CHRONIC VENOUS INSUFFICIENCY (HCC): Status: ACTIVE | Noted: 2025-02-06

## 2025-02-06 RX ORDER — CEPHALEXIN 500 MG/1
500 CAPSULE ORAL 2 TIMES DAILY
Qty: 10 CAPSULE | Refills: 0 | Status: SHIPPED | OUTPATIENT
Start: 2025-02-06 | End: 2025-02-11

## 2025-02-06 ASSESSMENT — PATIENT HEALTH QUESTIONNAIRE - PHQ9
SUM OF ALL RESPONSES TO PHQ QUESTIONS 1-9: 0
SUM OF ALL RESPONSES TO PHQ9 QUESTIONS 1 & 2: 0
2. FEELING DOWN, DEPRESSED OR HOPELESS: NOT AT ALL
1. LITTLE INTEREST OR PLEASURE IN DOING THINGS: NOT AT ALL
SUM OF ALL RESPONSES TO PHQ QUESTIONS 1-9: 0

## 2025-02-06 ASSESSMENT — ENCOUNTER SYMPTOMS
VOMITING: 0
DIARRHEA: 0
BACK PAIN: 0
NAUSEA: 0

## 2025-02-06 NOTE — PROGRESS NOTES
media    Cardiovascular:      Rate and Rhythm: Normal rate and regular rhythm.      Pulses: Normal pulses.      Heart sounds: Normal heart sounds.   Musculoskeletal:         General: Normal range of motion.      Cervical back: Normal range of motion and neck supple. No edema or rigidity.   Skin:     General: Skin is warm and dry.      Capillary Refill: Capillary refill takes less than 2 seconds.      Findings: Lesion (small flesh colored lesion- pain with palpation slightly raised papular no induration) present.   Neurological:      General: No focal deficit present.      Mental Status: He is alert and oriented to person, place, and time. Mental status is at baseline.   Psychiatric:         Mood and Affect: Mood normal.         Behavior: Behavior normal.        On this date 2/6/2025 I have spent 20 minutes reviewing previous notes, test results and face to face with the patient discussing the diagnosis and importance of compliance with the treatment plan as well as documenting on the day of the visit.      An electronic signature was used to authenticate this note.    --TAWANA Dickerson - CNP

## 2025-02-28 DIAGNOSIS — L53.9 ERYTHEMA OF LOWER EXTREMITY: ICD-10-CM

## 2025-02-28 RX ORDER — MUPIROCIN 20 MG/G
OINTMENT TOPICAL
Qty: 150 G | Refills: 1 | Status: SHIPPED | OUTPATIENT
Start: 2025-02-28

## 2025-02-28 NOTE — TELEPHONE ENCOUNTER
Future Appointments   Date Time Provider Department Center   3/18/2025  4:00 PM Rafael Conn MD [facility] ECC DEP   4/2/2025  9:00 AM Robert Gallo DO [facility]   4/7/2025  3:45 PM Rafael Conn MD [facility] DEP       Last appt 2/6/25

## 2025-03-07 DIAGNOSIS — N40.1 BENIGN PROSTATIC HYPERPLASIA WITH LOWER URINARY TRACT SYMPTOMS, SYMPTOM DETAILS UNSPECIFIED: ICD-10-CM

## 2025-03-07 RX ORDER — TAMSULOSIN HYDROCHLORIDE 0.4 MG/1
0.8 CAPSULE ORAL DAILY
Qty: 180 CAPSULE | Refills: 1 | Status: SHIPPED | OUTPATIENT
Start: 2025-03-07

## 2025-03-07 NOTE — TELEPHONE ENCOUNTER
Medication:   Requested Prescriptions     Pending Prescriptions Disp Refills    tamsulosin (FLOMAX) 0.4 MG capsule [Pharmacy Med Name: TAMSULOSIN HCL 0.4 MG CAPSULE] 180 capsule 1     Sig: TAKE 2 CAPSULES BY MOUTH EVERY DAY       Last Filled:      Patient Phone Number: [phone] (home) [phone] (work)    Last appt: 2/6/2025   Next appt: 3/18/2025  Future Appointments   Date Time Provider Department Center   3/18/2025  4:00 PM Rafael Conn MD [facility] DEP   4/2/2025  9:00 AM Robert Gallo DO [facility]   4/7/2025  3:45 PM Rafael Conn MD [facility] DEP

## 2025-03-18 ENCOUNTER — OFFICE VISIT (OUTPATIENT)
Dept: INTERNAL MEDICINE CLINIC | Age: 80
End: 2025-03-18

## 2025-03-18 DIAGNOSIS — Z00.00 MEDICARE ANNUAL WELLNESS VISIT, SUBSEQUENT: Primary | ICD-10-CM

## 2025-03-18 SDOH — ECONOMIC STABILITY: FOOD INSECURITY: WITHIN THE PAST 12 MONTHS, YOU WORRIED THAT YOUR FOOD WOULD RUN OUT BEFORE YOU GOT MONEY TO BUY MORE.: NEVER TRUE

## 2025-03-18 SDOH — ECONOMIC STABILITY: FOOD INSECURITY: WITHIN THE PAST 12 MONTHS, THE FOOD YOU BOUGHT JUST DIDN'T LAST AND YOU DIDN'T HAVE MONEY TO GET MORE.: NEVER TRUE

## 2025-03-18 ASSESSMENT — LIFESTYLE VARIABLES
HOW OFTEN DO YOU HAVE A DRINK CONTAINING ALCOHOL: NEVER
HOW MANY STANDARD DRINKS CONTAINING ALCOHOL DO YOU HAVE ON A TYPICAL DAY: PATIENT DOES NOT DRINK

## 2025-03-18 ASSESSMENT — PATIENT HEALTH QUESTIONNAIRE - PHQ9
SUM OF ALL RESPONSES TO PHQ QUESTIONS 1-9: 0
1. LITTLE INTEREST OR PLEASURE IN DOING THINGS: NOT AT ALL
SUM OF ALL RESPONSES TO PHQ QUESTIONS 1-9: 0
2. FEELING DOWN, DEPRESSED OR HOPELESS: NOT AT ALL
SUM OF ALL RESPONSES TO PHQ QUESTIONS 1-9: 0
SUM OF ALL RESPONSES TO PHQ QUESTIONS 1-9: 0

## 2025-03-18 NOTE — PROGRESS NOTES
DAILY Yes Rafael Conn MD   Handtalap Placard MISC by Does not apply route It is my medical opinion that Shadi Carreon requires a disability parking placard for the following reasons:  He cannot walk without assistance from another person or the use of an assistance device (cane, crutch, prosthetic device, wheelchair, etc.).  Duration of need: 5 years Yes Rafael Conn MD   aspirin 81 MG EC tablet Take 1 tablet by mouth daily Yes ProviderGuy MD   fluticasone (FLONASE) 50 MCG/ACT nasal spray SPRAY 1 SPRAY INTO EACH NOSTRIL EVERY DAY Yes Michel Gunter MD Handtalaebonie Placard MISC by Does not apply route Yes Pankaj Loredo MD   Omega-3 Fatty Acids (FISH OIL) 1000 MG CAPS Take 3 capsules by mouth daily Yes Guy Hanson MD   vitamin D (CHOLECALCIFEROL) 1000 UNIT TABS tablet Take 1 tablet by mouth Yes Guy Hanson MD   hydroxyurea (HYDREA) 500 MG chemo capsule TAKE TWO CAPSULES BY MOUTH DAILY Yes Alexandra Walker MD   IRON PO Take 65 mg by mouth daily. Yes ProviderGuy MD       CareTeam (Including outside providers/suppliers regularly involved in providing care):   Patient Care Team:  Rafael Conn MD as PCP - General (Internal Medicine)  Alexandra Walker MD as PCP - Hematology/Oncology (Hematology and Oncology)  [facility]  Catherine Kraus as  (Hematology and Oncology)     Recommendations for Preventive Services Due: see orders and patient instructions/AVS.  Recommended screening schedule for the next 5-10 years is provided to the patient in written form: see Patient Instructions/AVS.     Reviewed and updated this visit:  Allergies  Meds

## 2025-03-18 NOTE — PATIENT INSTRUCTIONS
PRISCILA Carreon - 3/18/2025  Medicare offers a range of preventive health benefits. Some of the tests and screenings are paid in full while other may be subject to a deductible, co-insurance, and/or copay.  Some of these benefits include a comprehensive review of your medical history including lifestyle, illnesses that may run in your family, and various assessments and screenings as appropriate.  After reviewing your medical record and screening and assessments performed today your provider may have ordered immunizations, labs, imaging, and/or referrals for you.  A list of these orders (if applicable) as well as your Preventive Care list are included within your After Visit Summary for your review.

## 2025-04-06 DIAGNOSIS — R22.43 LOCALIZED SWELLING OF BOTH LOWER LEGS: ICD-10-CM

## 2025-04-07 RX ORDER — POTASSIUM CHLORIDE 750 MG/1
10 TABLET, EXTENDED RELEASE ORAL DAILY
Qty: 90 TABLET | Refills: 0 | Status: SHIPPED | OUTPATIENT
Start: 2025-04-07

## 2025-04-07 NOTE — TELEPHONE ENCOUNTER
Future Appointments   Date Time Provider Department Center   4/7/2025  3:45 PM Rafael Conn MD [facility] ECC DEP

## 2025-04-23 ENCOUNTER — OFFICE VISIT (OUTPATIENT)
Dept: INTERNAL MEDICINE CLINIC | Age: 80
End: 2025-04-23

## 2025-04-23 VITALS
SYSTOLIC BLOOD PRESSURE: 120 MMHG | HEART RATE: 76 BPM | BODY MASS INDEX: 38.92 KG/M2 | DIASTOLIC BLOOD PRESSURE: 70 MMHG | OXYGEN SATURATION: 98 % | WEIGHT: 287 LBS

## 2025-04-23 DIAGNOSIS — J96.01 ACUTE RESPIRATORY FAILURE WITH HYPOXIA (HCC): ICD-10-CM

## 2025-04-23 DIAGNOSIS — I10 ESSENTIAL HYPERTENSION: ICD-10-CM

## 2025-04-23 DIAGNOSIS — D47.3 ESSENTIAL THROMBOCYTHEMIA (HCC): ICD-10-CM

## 2025-04-23 DIAGNOSIS — N40.1 BENIGN PROSTATIC HYPERPLASIA WITH LOWER URINARY TRACT SYMPTOMS, SYMPTOM DETAILS UNSPECIFIED: Primary | ICD-10-CM

## 2025-04-23 RX ORDER — CLOBETASOL PROPIONATE 0.5 MG/ML
SOLUTION TOPICAL
COMMUNITY
Start: 2025-02-25

## 2025-04-23 RX ORDER — KETOCONAZOLE 20 MG/ML
SHAMPOO, SUSPENSION TOPICAL
COMMUNITY
Start: 2025-02-25

## 2025-04-23 NOTE — PROGRESS NOTES
Shadi Carreon is a 79 y.o. male with a past medical history noted below who presents for routine follow up on chronic conditions and discussion of preventative health care.  Chief Complaint   Patient presents with    3 Month Follow-Up           Past Medical History:   Diagnosis Date    Amnesia 01/78    Arthritis     Erythema     Antral    Essential thrombocythemia (HCC)     HBP (high blood pressure)     Nodule     Stomach     Patient Active Problem List   Diagnosis    Sleep apnea    Edema    Encounter for long-term (current) use of other medications    Essential thrombocythemia    Essential hypertension, benign    Osteoarthritis of left knee    Osteoarthritis of hip-right    Allergic rhinitis    Allergic dermatitis    Primary osteoarthritis of left hip    Arthritis of left hip    Morbid obesity    History of total left hip replacement    Arthritis of right knee    Tendinitis of finger    Hx of total hip arthroplasty, right    Achilles tendinosis of right lower extremity    MENG-2 gene mutation    Encounter for long-term (current) use of high-risk medication    Benign non-nodular prostatic hyperplasia with lower urinary tract symptoms    Rosacea    Right groin pain    Arthritis of right hip    Pneumonia, bacterial    Leg swelling    Localized swelling of both lower legs    Lymphedema due to venous insufficiency    Stasis dermatitis of both legs    Ulcer of extremity due to chronic venous insufficiency    Acute respiratory failure with hypoxia       Vitals:    04/23/25 1557   BP: 120/70   Pulse: 76   SpO2: 98%   Weight: 130.2 kg (287 lb)     General: Alert and oriented X3. No acute distress  Eyes: PEERL. No scleral icterus noted. Normal appearing conjunctiva bilaterally  Ears: Normal TM and external canal bilaterally.  Oral cavity/Throat: No pharyngeal erythema. No oral lesions.  Cardiovascular: Heart is regular rate and rhythm. No murmurs noted. Radial pulses 2+. Posterior tibial pulses 2+. No lower extremity edema

## 2025-05-23 DIAGNOSIS — N40.1 BENIGN PROSTATIC HYPERPLASIA WITH LOWER URINARY TRACT SYMPTOMS, SYMPTOM DETAILS UNSPECIFIED: ICD-10-CM

## 2025-05-23 RX ORDER — TAMSULOSIN HYDROCHLORIDE 0.4 MG/1
CAPSULE ORAL DAILY
Qty: 90 CAPSULE | Refills: 3 | Status: SHIPPED | OUTPATIENT
Start: 2025-05-23

## 2025-05-23 NOTE — TELEPHONE ENCOUNTER
Future Appointments   Date Time Provider Department Center   8/22/2025  3:45 PM Rafael Conn MD [facility] ECC DEP

## 2025-06-20 DIAGNOSIS — R22.43 LOCALIZED SWELLING OF BOTH LOWER LEGS: ICD-10-CM

## 2025-06-23 RX ORDER — POTASSIUM CHLORIDE 750 MG/1
10 TABLET, EXTENDED RELEASE ORAL DAILY
Qty: 90 TABLET | Refills: 0 | Status: SHIPPED | OUTPATIENT
Start: 2025-06-23

## 2025-06-23 RX ORDER — FUROSEMIDE 20 MG/1
20 TABLET ORAL DAILY
Qty: 90 TABLET | Refills: 0 | Status: SHIPPED | OUTPATIENT
Start: 2025-06-23

## 2025-06-23 RX ORDER — TIZANIDINE 2 MG/1
2 TABLET ORAL EVERY 8 HOURS PRN
Qty: 270 TABLET | Refills: 0 | Status: SHIPPED | OUTPATIENT
Start: 2025-06-23

## 2025-06-23 NOTE — TELEPHONE ENCOUNTER
Medication:   Requested Prescriptions     Pending Prescriptions Disp Refills    tiZANidine (ZANAFLEX) 2 MG tablet [Pharmacy Med Name: TIZANIDINE HCL 2 MG TABLET] 270 tablet 1     Sig: TAKE 1 TABLET BY MOUTH EVERY 8 HOURS AS NEEDED (BACK AND HIP PAIN)    potassium chloride (KLOR-CON M) 10 MEQ extended release tablet [Pharmacy Med Name: POTASSIUM CL ER 10 MEQ TAB MCR] 90 tablet 0     Sig: TAKE 1 TABLET BY MOUTH EVERY DAY    furosemide (LASIX) 20 MG tablet [Pharmacy Med Name: FUROSEMIDE 20 MG TABLET] 90 tablet 1     Sig: TAKE 1 TABLET BY MOUTH EVERY DAY       Last Filled:      Patient Phone Number: [phone] (home) [phone] (work)    Last appt: 4/23/2025   Next appt: 8/22/2025  Future Appointments   Date Time Provider Department Center   8/22/2025  3:45 PM Rafael Conn MD [facility] ECC DEP

## 2025-08-22 ENCOUNTER — OFFICE VISIT (OUTPATIENT)
Dept: INTERNAL MEDICINE CLINIC | Age: 80
End: 2025-08-22
Payer: MEDICARE

## 2025-08-22 VITALS
OXYGEN SATURATION: 97 % | BODY MASS INDEX: 39.19 KG/M2 | WEIGHT: 289 LBS | HEART RATE: 77 BPM | DIASTOLIC BLOOD PRESSURE: 80 MMHG | SYSTOLIC BLOOD PRESSURE: 120 MMHG

## 2025-08-22 DIAGNOSIS — E55.9 VITAMIN D DEFICIENCY, UNSPECIFIED: ICD-10-CM

## 2025-08-22 DIAGNOSIS — I10 ESSENTIAL HYPERTENSION: ICD-10-CM

## 2025-08-22 DIAGNOSIS — R79.9 ABNORMAL FINDING OF BLOOD CHEMISTRY, UNSPECIFIED: ICD-10-CM

## 2025-08-22 DIAGNOSIS — N40.1 BENIGN PROSTATIC HYPERPLASIA WITH LOWER URINARY TRACT SYMPTOMS, SYMPTOM DETAILS UNSPECIFIED: Primary | ICD-10-CM

## 2025-08-22 PROBLEM — L98.499 ULCER OF EXTREMITY DUE TO CHRONIC VENOUS INSUFFICIENCY (HCC): Status: RESOLVED | Noted: 2025-02-06 | Resolved: 2025-08-22

## 2025-08-22 PROBLEM — I87.2 ULCER OF EXTREMITY DUE TO CHRONIC VENOUS INSUFFICIENCY (HCC): Status: RESOLVED | Noted: 2025-02-06 | Resolved: 2025-08-22

## 2025-08-22 PROCEDURE — 1159F MED LIST DOCD IN RCRD: CPT | Performed by: STUDENT IN AN ORGANIZED HEALTH CARE EDUCATION/TRAINING PROGRAM

## 2025-08-22 PROCEDURE — 3079F DIAST BP 80-89 MM HG: CPT | Performed by: STUDENT IN AN ORGANIZED HEALTH CARE EDUCATION/TRAINING PROGRAM

## 2025-08-22 PROCEDURE — 3074F SYST BP LT 130 MM HG: CPT | Performed by: STUDENT IN AN ORGANIZED HEALTH CARE EDUCATION/TRAINING PROGRAM

## 2025-08-22 PROCEDURE — 1123F ACP DISCUSS/DSCN MKR DOCD: CPT | Performed by: STUDENT IN AN ORGANIZED HEALTH CARE EDUCATION/TRAINING PROGRAM

## 2025-08-22 PROCEDURE — 36415 COLL VENOUS BLD VENIPUNCTURE: CPT | Performed by: STUDENT IN AN ORGANIZED HEALTH CARE EDUCATION/TRAINING PROGRAM

## 2025-08-22 PROCEDURE — 99213 OFFICE O/P EST LOW 20 MIN: CPT | Performed by: STUDENT IN AN ORGANIZED HEALTH CARE EDUCATION/TRAINING PROGRAM

## 2025-08-22 ASSESSMENT — PATIENT HEALTH QUESTIONNAIRE - PHQ9
2. FEELING DOWN, DEPRESSED OR HOPELESS: NOT AT ALL
SUM OF ALL RESPONSES TO PHQ QUESTIONS 1-9: 0
1. LITTLE INTEREST OR PLEASURE IN DOING THINGS: NOT AT ALL

## (undated) DEVICE — CANISTER, RIGID, 1200CC: Brand: MEDLINE INDUSTRIES, INC.

## (undated) DEVICE — GOWN,REINF,POLY,AURORA,XLNG/XXL,STRL: Brand: MEDLINE

## (undated) DEVICE — SHEET,DRAPE,53X77,STERILE: Brand: MEDLINE

## (undated) DEVICE — COVER LT HNDL BLU PLAS

## (undated) DEVICE — BIPOLAR SEALER 23-112-1 AQM 6.0: Brand: AQUAMANTYS ®

## (undated) DEVICE — HYPODERMIC SAFETY NEEDLE: Brand: MAGELLAN

## (undated) DEVICE — E-Z CLEAN, NON-STICK, PTFE COATED, ELECTROSURGICAL BLADE ELECTRODE, 4 INCH (10.2 CM): Brand: MEGADYNE

## (undated) DEVICE — DUAL CUT SAGITTAL BLADE

## (undated) DEVICE — SOLUTION IV 1000ML 0.9% SOD CHL FOR IRRIG PLAS CONT

## (undated) DEVICE — Z DISCONTINUED USE 2716304 SUTURE STRATAFIX SPRL SZ 3-0 L12IN ABSRB UD FS-1 L24MM 3/8

## (undated) DEVICE — SOLUTION IV IRRIG POUR BRL 0.9% SODIUM CHL 2F7124

## (undated) DEVICE — SYRINGE MED 30ML STD CLR PLAS LUERLOCK TIP N CTRL DISP

## (undated) DEVICE — SUTURE ABSORBABLE MONOFILAMENT 1-0 OS8 14 IN STRATAFIX SPRL SXPD2B202

## (undated) DEVICE — PAD,NON-ADHERENT,3X8,STERILE,LF,1/PK: Brand: MEDLINE

## (undated) DEVICE — SPONGE LAP W18XL18IN WHT COT 4 PLY FLD STRUNG RADPQ DISP ST

## (undated) DEVICE — COVER,TABLE,77X90,STERILE: Brand: MEDLINE

## (undated) DEVICE — GLOVE,SURG,SENSICARE SLT,LF,PF,8: Brand: MEDLINE

## (undated) DEVICE — SYRINGE IRRIG 60ML SFT PLIABLE BLB EZ TO GRP 1 HND USE W/

## (undated) DEVICE — SUTURE STRATAFIX SPRL SZ 2 0 L14IN ABSRB UD MH L36MM 1 2 CIR SXMD2B401

## (undated) DEVICE — 3M™ COBAN™ NL STERILE NON-LATEX SELF-ADHERENT WRAP, 2083S, 3 IN X 5 YD (7,5 CM X 4,5 M), 24 ROLLS/CASE: Brand: 3M™ COBAN™

## (undated) DEVICE — NEEDLE HYPO 18GA L1.5IN THN WALL PIVOTING SHLD BVL ORIENTED

## (undated) DEVICE — SYSTEM SKIN CLSR 22CM DERMBND PRINEO

## (undated) DEVICE — BASIC SINGLE BASIN 1-LF: Brand: MEDLINE INDUSTRIES, INC.

## (undated) DEVICE — KIT OR ROOM TURNOVER W/STRAP

## (undated) DEVICE — 3M™ IOBAN™ 2 ANTIMICROBIAL INCISE DRAPE 6650EZ: Brand: IOBAN™ 2

## (undated) DEVICE — TOTAL BASIC PK

## (undated) DEVICE — CHLORAPREP 26ML ORANGE

## (undated) DEVICE — GLOVE,SURG,SENSICARE SLT,LF,PF,8.5: Brand: MEDLINE

## (undated) DEVICE — GLOVE SURG SZ 85 L12IN FNGR THK13MIL WHT ISOLEX POLYISOPRENE

## (undated) DEVICE — STERILE TOTAL HIP DRAPE PK: Brand: CARDINAL HEALTH

## (undated) DEVICE — SUTURE TICRON SZ 2 L30IN NONABSORBABLE BLU HGS-21 1/2 CIR 8886311381

## (undated) DEVICE — DECANTER BAG 9": Brand: MEDLINE INDUSTRIES, INC.

## (undated) DEVICE — MATTRESS MAXI AIR TRNSF SGL PT USE DCS 37 45 48 52 75

## (undated) DEVICE — SOLUTION PREP POVIDONE IOD FOR SKIN MUCOUS MEM PRIOR TO

## (undated) DEVICE — ELECTRODE PT RET AD L9FT HI MOIST COND ADH HYDRGEL CORDED

## (undated) DEVICE — COVER,MAYO STAND,STERILE: Brand: MEDLINE

## (undated) DEVICE — SYRINGE MED 3ML CLR PLAS STD N CTRL LUERLOCK TIP DISP

## (undated) DEVICE — SUTURE TICRN BR BL NDL C-20 SZ 5 30 8886302779